# Patient Record
Sex: FEMALE | Race: WHITE | NOT HISPANIC OR LATINO | Employment: OTHER | URBAN - METROPOLITAN AREA
[De-identification: names, ages, dates, MRNs, and addresses within clinical notes are randomized per-mention and may not be internally consistent; named-entity substitution may affect disease eponyms.]

---

## 2017-01-03 ENCOUNTER — PATIENT MESSAGE (OUTPATIENT)
Dept: RHEUMATOLOGY | Facility: CLINIC | Age: 72
End: 2017-01-03

## 2017-02-03 ENCOUNTER — PATIENT MESSAGE (OUTPATIENT)
Dept: RHEUMATOLOGY | Facility: CLINIC | Age: 72
End: 2017-02-03

## 2017-04-26 ENCOUNTER — PATIENT MESSAGE (OUTPATIENT)
Dept: INTERNAL MEDICINE | Facility: CLINIC | Age: 72
End: 2017-04-26

## 2017-04-26 ENCOUNTER — PATIENT MESSAGE (OUTPATIENT)
Dept: OPTOMETRY | Facility: CLINIC | Age: 72
End: 2017-04-26

## 2017-04-26 NOTE — TELEPHONE ENCOUNTER
Spoke with pt and scheduled her annual appt on July7th @1:00. Pt would like to have tests done. Please see msg

## 2017-05-09 ENCOUNTER — PATIENT MESSAGE (OUTPATIENT)
Dept: INTERNAL MEDICINE | Facility: CLINIC | Age: 72
End: 2017-05-09

## 2017-05-09 DIAGNOSIS — Z83.49 FAMILY HISTORY OF FABRY DISEASE: Primary | ICD-10-CM

## 2017-05-12 NOTE — TELEPHONE ENCOUNTER
Referral placed to Genetics clinic, please contact patient to help schedule. Message also sent to patient

## 2017-05-16 ENCOUNTER — OFFICE VISIT (OUTPATIENT)
Dept: OPTOMETRY | Facility: CLINIC | Age: 72
End: 2017-05-16
Payer: MEDICARE

## 2017-05-16 DIAGNOSIS — Z79.899 HIGH RISK MEDICATION USE: ICD-10-CM

## 2017-05-16 DIAGNOSIS — M35.03 SJOGREN'S SYNDROME WITH MYOPATHY: ICD-10-CM

## 2017-05-16 DIAGNOSIS — H25.813 COMBINED FORMS OF AGE-RELATED CATARACT OF BOTH EYES: ICD-10-CM

## 2017-05-16 DIAGNOSIS — I70.0 AORTIC ATHEROSCLEROSIS: ICD-10-CM

## 2017-05-16 DIAGNOSIS — M05.79 RHEUMATOID ARTHRITIS INVOLVING MULTIPLE SITES WITH POSITIVE RHEUMATOID FACTOR: ICD-10-CM

## 2017-05-16 DIAGNOSIS — H35.372 EPIRETINAL MEMBRANE, LEFT EYE: Primary | ICD-10-CM

## 2017-05-16 PROCEDURE — 99999 PR PBB SHADOW E&M-EST. PATIENT-LVL II: CPT | Mod: PBBFAC,,, | Performed by: OPTOMETRIST

## 2017-05-16 PROCEDURE — 92015 DETERMINE REFRACTIVE STATE: CPT | Mod: S$GLB,,, | Performed by: OPTOMETRIST

## 2017-05-16 PROCEDURE — 92014 COMPRE OPH EXAM EST PT 1/>: CPT | Mod: S$GLB,,, | Performed by: OPTOMETRIST

## 2017-05-16 NOTE — PROGRESS NOTES
FRANK Martínez is a/an 72 y.o. Female who returns for continued eye care  She reports that her vision is ok with her current glasses. But sometimes   she noticed trouble with her distance vision even with glasses and near   vision problems sometimes. She uses Systane eye drops if she needs it. She   reports episodes of blurry vision in neither one or the other eye but just   on rare occasions ( 1x a week) but than it lasts about 20 min. Uses    Plaquenil since years and was diagnosed with intermitted exotropia before.    (+)blurred vision  (--)Headaches  +diplopia vertical most of the time  (--)flashes  (--)floaters   (--)pain  (--)Itching  (--)tearing  (--)burning  (+)Dryness rarely  (+) OTC Drops  (--)Photophobia       Last edited by Owen Tripp on 5/16/2017  2:58 PM.     ROS     Positive for: Gastrointestinal (benign liver cysts), Musculoskeletal   (sjrogen's; rheumatoid arthritis), Eyes (epiretinal membrane), Heme/Lymph   (reynaud'sds., osteopenia)    Negative for: Constitutional, Neurological, Skin, Genitourinary, HENT,   Endocrine, Cardiovascular, Respiratory, Psychiatric, Allergic/Imm    Last edited by Ila Gross, OD on 5/16/2017  3:55 PM. (History)        Assessment /Plan     For exam results, see Encounter Report.    1. Epiretinal membrane, left eye with h/o pseudohole --> stable  - no treatment needed at this time; Monitor annually    2. Plaquenil use for use Rheumatoid arthritis involving multiple sites with positive rheumatoid factor  - No maculopathy  - Monitor in 1 year with HVF 10-2 and OCT of macula    3. Sjogren's syndrome with myopathy   - No significant ocular symptoms  - Continue use of artificial tears as needed    4. Combined forms of age-related cataract of both eyes --> not subjectively visually significant  - no treatment needed at this time; Monitor annually    5. Aortic atherosclerosis  - no ocular associations (no [place] plaques or significant AV  changes)  - No ocular  treatment needed; monitor annually    6. Intermittent exotropia --> stable  - Prism in spec rx    7. Refractive error with Presbyopia  - Spec Rx per final Rx below  Glasses Prescription (5/16/2017)        Sphere Cylinder Axis Add Horz Prism Vert Prism    Right +0.50 +0.50 140 +2.50 0.5 in 0.5 up    Left +1.00 +0.75 115 +2.50 0.5 in 0.5 down    Type:  PAL    Expiration Date:  5/17/2018           Patient education; RTC in 1 year for Plaquenil check - HVF10-2, DFE ,OCT of macula and refraction at [facility]

## 2017-06-13 ENCOUNTER — OFFICE VISIT (OUTPATIENT)
Dept: OPHTHALMOLOGY | Facility: CLINIC | Age: 72
End: 2017-06-13
Payer: MEDICARE

## 2017-06-13 DIAGNOSIS — M06.9 RHEUMATOID ARTHRITIS INVOLVING MULTIPLE SITES, UNSPECIFIED RHEUMATOID FACTOR PRESENCE: ICD-10-CM

## 2017-06-13 DIAGNOSIS — H35.372 EPIRETINAL MEMBRANE, LEFT EYE: Primary | ICD-10-CM

## 2017-06-13 DIAGNOSIS — Z79.899 HIGH RISK MEDICATION USE: ICD-10-CM

## 2017-06-13 DIAGNOSIS — H25.813 COMBINED FORMS OF AGE-RELATED CATARACT OF BOTH EYES: ICD-10-CM

## 2017-06-13 PROCEDURE — 92134 CPTRZ OPH DX IMG PST SGM RTA: CPT | Mod: S$GLB,,, | Performed by: OPHTHALMOLOGY

## 2017-06-13 PROCEDURE — 92226 PR SPECIAL EYE EXAM, SUBSEQUENT: CPT | Mod: LT,S$GLB,, | Performed by: OPHTHALMOLOGY

## 2017-06-13 PROCEDURE — 99999 PR PBB SHADOW E&M-EST. PATIENT-LVL III: CPT | Mod: PBBFAC,,, | Performed by: OPHTHALMOLOGY

## 2017-06-13 PROCEDURE — 92014 COMPRE OPH EXAM EST PT 1/>: CPT | Mod: S$GLB,,, | Performed by: OPHTHALMOLOGY

## 2017-06-13 PROCEDURE — 99499 UNLISTED E&M SERVICE: CPT | Mod: S$GLB,,, | Performed by: OPHTHALMOLOGY

## 2017-06-13 NOTE — PROGRESS NOTES
OCT - ERM OS - minimal foveal traction; unchanged from previous exam    A/P    1.  Epiretinal membrane, left eye - not related to plaquenil         2.  Nuclear sclerosis - not visually significant        3.  Sicca syndrome without dry eye symptoms         4.  High-risk medication - Plaquenil use for Sjrogen's syndrome since 2014. - check OCT        1.  Yr. OCT

## 2017-06-21 ENCOUNTER — OFFICE VISIT (OUTPATIENT)
Dept: RHEUMATOLOGY | Facility: CLINIC | Age: 72
End: 2017-06-21
Payer: MEDICARE

## 2017-06-21 VITALS
BODY MASS INDEX: 29.18 KG/M2 | HEART RATE: 67 BPM | SYSTOLIC BLOOD PRESSURE: 157 MMHG | DIASTOLIC BLOOD PRESSURE: 78 MMHG | WEIGHT: 175.13 LBS | HEIGHT: 65 IN

## 2017-06-21 DIAGNOSIS — M15.9 PRIMARY OSTEOARTHRITIS INVOLVING MULTIPLE JOINTS: ICD-10-CM

## 2017-06-21 DIAGNOSIS — M35.00 SJOGREN'S SYNDROME, WITH UNSPECIFIED ORGAN INVOLVEMENT: Primary | ICD-10-CM

## 2017-06-21 PROCEDURE — 99213 OFFICE O/P EST LOW 20 MIN: CPT | Mod: S$GLB,,, | Performed by: INTERNAL MEDICINE

## 2017-06-21 PROCEDURE — 99499 UNLISTED E&M SERVICE: CPT | Mod: S$GLB,,, | Performed by: INTERNAL MEDICINE

## 2017-06-21 PROCEDURE — 1126F AMNT PAIN NOTED NONE PRSNT: CPT | Mod: S$GLB,,, | Performed by: INTERNAL MEDICINE

## 2017-06-21 PROCEDURE — 99999 PR PBB SHADOW E&M-EST. PATIENT-LVL III: CPT | Mod: PBBFAC,,, | Performed by: INTERNAL MEDICINE

## 2017-06-21 PROCEDURE — 1159F MED LIST DOCD IN RCRD: CPT | Mod: S$GLB,,, | Performed by: INTERNAL MEDICINE

## 2017-06-21 ASSESSMENT — ROUTINE ASSESSMENT OF PATIENT INDEX DATA (RAPID3)
PATIENT GLOBAL ASSESSMENT SCORE: 1.5
TOTAL RAPID3 SCORE: 1.83
PAIN SCORE: 3
MDHAQ FUNCTION SCORE: .3
FATIGUE SCORE: 1
PSYCHOLOGICAL DISTRESS SCORE: 0
AM STIFFNESS SCORE: 1, YES

## 2017-06-21 NOTE — PROGRESS NOTES
History of present illness: 72-year-old female I follow for primary Sjogren syndrome.  She has a history of inflammatory arthritis in the past but not recently.  She is on chronic Plaquenil therapy.  She has a history of Raynaud's phenomenon but this has not bothered her recently.  She remains on cevimeline twice daily.  She has had no recent eye dryness.  She is complaining of increased perspiration injury rolling.    I have also been following her for osteoarthritis.  She is taking diflunisal as needed.  It does help when she takes it.  Her worst areas are her left ankle and left knee.  She had had a previous episode of sciatica but this resolve.  She had a bad attack of knee pain but this also resolved.  She has had no joint swelling.    Physical examination:  Skin: No rashes  ENT: Decreased tears but adequate saliva  Musculoskeletal: She has decreased range of motion of the right shoulder with slight pain on range of motion.  She has no tenderness to palpation.  Left shoulder is unremarkable.  Elbows, wrists, small joints of the hands have no tenderness.  She has no synovitis.  Knees have good range of motion without pain on range of motion.  Ankles and feet are unremarkable.    Assessment:  1.  Primary Sjogren syndrome  2.  Osteoarthritis    Plans:  1. Taper cevimiline to see if she still needs it and whether this will help the perspiration injury rolling  2.  Continue Plaquenil as before  3.  Continue diflunisal as needed  4.  Return to see me in 12 months

## 2017-07-03 ENCOUNTER — TELEPHONE (OUTPATIENT)
Dept: INTERNAL MEDICINE | Facility: CLINIC | Age: 72
End: 2017-07-03

## 2017-07-03 ENCOUNTER — LAB VISIT (OUTPATIENT)
Dept: LAB | Facility: HOSPITAL | Age: 72
End: 2017-07-03
Attending: INTERNAL MEDICINE
Payer: MEDICARE

## 2017-07-03 DIAGNOSIS — M35.00 SJOGREN'S SYNDROME: ICD-10-CM

## 2017-07-03 DIAGNOSIS — M06.9 RHEUMATOID ARTHRITIS INVOLVING MULTIPLE SITES, UNSPECIFIED RHEUMATOID FACTOR PRESENCE: ICD-10-CM

## 2017-07-03 DIAGNOSIS — Z00.00 ANNUAL PHYSICAL EXAM: ICD-10-CM

## 2017-07-03 DIAGNOSIS — N20.0 NEPHROLITHIASIS: Primary | ICD-10-CM

## 2017-07-03 DIAGNOSIS — I70.0 AORTIC ATHEROSCLEROSIS: ICD-10-CM

## 2017-07-03 DIAGNOSIS — Z79.899 HIGH RISK MEDICATION USE: ICD-10-CM

## 2017-07-03 LAB
ALBUMIN SERPL BCP-MCNC: 3.6 G/DL
ALP SERPL-CCNC: 55 U/L
ALT SERPL W/O P-5'-P-CCNC: 14 U/L
ANION GAP SERPL CALC-SCNC: 10 MMOL/L
AST SERPL-CCNC: 22 U/L
BASOPHILS # BLD AUTO: 0.09 K/UL
BASOPHILS NFR BLD: 1.6 %
BILIRUB SERPL-MCNC: 0.8 MG/DL
BUN SERPL-MCNC: 21 MG/DL
CALCIUM SERPL-MCNC: 9.2 MG/DL
CHLORIDE SERPL-SCNC: 107 MMOL/L
CHOLEST/HDLC SERPL: 3.8 {RATIO}
CO2 SERPL-SCNC: 24 MMOL/L
CREAT SERPL-MCNC: 0.9 MG/DL
DIFFERENTIAL METHOD: NORMAL
EOSINOPHIL # BLD AUTO: 0.1 K/UL
EOSINOPHIL NFR BLD: 1.3 %
ERYTHROCYTE [DISTWIDTH] IN BLOOD BY AUTOMATED COUNT: 11.8 %
EST. GFR  (AFRICAN AMERICAN): >60 ML/MIN/1.73 M^2
EST. GFR  (NON AFRICAN AMERICAN): >60 ML/MIN/1.73 M^2
GLUCOSE SERPL-MCNC: 85 MG/DL
HCT VFR BLD AUTO: 40.7 %
HDL/CHOLESTEROL RATIO: 26.3 %
HDLC SERPL-MCNC: 167 MG/DL
HDLC SERPL-MCNC: 44 MG/DL
HGB BLD-MCNC: 13.5 G/DL
LDLC SERPL CALC-MCNC: 104.2 MG/DL
LYMPHOCYTES # BLD AUTO: 1.9 K/UL
LYMPHOCYTES NFR BLD: 34.6 %
MCH RBC QN AUTO: 30.8 PG
MCHC RBC AUTO-ENTMCNC: 33.2 %
MCV RBC AUTO: 93 FL
MONOCYTES # BLD AUTO: 0.5 K/UL
MONOCYTES NFR BLD: 8.8 %
NEUTROPHILS # BLD AUTO: 3 K/UL
NEUTROPHILS NFR BLD: 53.5 %
NONHDLC SERPL-MCNC: 123 MG/DL
PLATELET # BLD AUTO: 212 K/UL
PMV BLD AUTO: 11 FL
POTASSIUM SERPL-SCNC: 4.1 MMOL/L
PROT SERPL-MCNC: 7 G/DL
RBC # BLD AUTO: 4.39 M/UL
SODIUM SERPL-SCNC: 141 MMOL/L
TRIGL SERPL-MCNC: 94 MG/DL
TSH SERPL DL<=0.005 MIU/L-ACNC: 2.62 UIU/ML
WBC # BLD AUTO: 5.57 K/UL

## 2017-07-03 PROCEDURE — 36415 COLL VENOUS BLD VENIPUNCTURE: CPT

## 2017-07-03 PROCEDURE — 84443 ASSAY THYROID STIM HORMONE: CPT

## 2017-07-03 PROCEDURE — 80053 COMPREHEN METABOLIC PANEL: CPT

## 2017-07-03 PROCEDURE — 80061 LIPID PANEL: CPT

## 2017-07-03 PROCEDURE — 85025 COMPLETE CBC W/AUTO DIFF WBC: CPT

## 2017-07-03 NOTE — TELEPHONE ENCOUNTER
----- Message from Trinh Braden sent at 7/3/2017  8:53 AM CDT -----  Contact: Kylee/ lab/ [id number]   Kylee is calling to let the doctor know the pt collected a urine sample. She want to know if the pt collected the wrong sample. Please call and advise     Thank you

## 2017-07-03 NOTE — TELEPHONE ENCOUNTER
Spoke to lab patient has an order for a urine with stone analysis ordered by Dr. Garcia. Patient just brought a regular urine in. Is this correct or was she supposed to bring in a stone that she passed. Please advise.

## 2017-07-03 NOTE — TELEPHONE ENCOUNTER
Spoke to patient she states she hasn't had trouble in over a year with stones. Says she doesn't need to do any urine at this time.

## 2017-07-03 NOTE — TELEPHONE ENCOUNTER
Looks like she was to bring in stone sample  If  She passed one   She would have to bring in stone  I can re-enter order so available if she needs to bring in   Notify pt

## 2017-07-07 ENCOUNTER — OFFICE VISIT (OUTPATIENT)
Dept: INTERNAL MEDICINE | Facility: CLINIC | Age: 72
End: 2017-07-07
Payer: MEDICARE

## 2017-07-07 VITALS
SYSTOLIC BLOOD PRESSURE: 130 MMHG | TEMPERATURE: 98 F | BODY MASS INDEX: 29.31 KG/M2 | WEIGHT: 175.94 LBS | HEIGHT: 65 IN | OXYGEN SATURATION: 99 % | DIASTOLIC BLOOD PRESSURE: 85 MMHG | HEART RATE: 72 BPM

## 2017-07-07 DIAGNOSIS — Z00.00 ANNUAL PHYSICAL EXAM: Primary | ICD-10-CM

## 2017-07-07 DIAGNOSIS — I70.0 AORTIC ATHEROSCLEROSIS: ICD-10-CM

## 2017-07-07 DIAGNOSIS — Z78.0 ASYMPTOMATIC MENOPAUSAL STATE: ICD-10-CM

## 2017-07-07 DIAGNOSIS — M85.80 OSTEOPENIA DETERMINED BY X-RAY: ICD-10-CM

## 2017-07-07 DIAGNOSIS — Z12.31 ENCOUNTER FOR SCREENING MAMMOGRAM FOR BREAST CANCER: ICD-10-CM

## 2017-07-07 DIAGNOSIS — M06.9 RHEUMATOID ARTHRITIS INVOLVING MULTIPLE SITES, UNSPECIFIED RHEUMATOID FACTOR PRESENCE: ICD-10-CM

## 2017-07-07 DIAGNOSIS — M35.00 SJOGREN'S SYNDROME, WITH UNSPECIFIED ORGAN INVOLVEMENT: ICD-10-CM

## 2017-07-07 DIAGNOSIS — M15.9 PRIMARY OSTEOARTHRITIS INVOLVING MULTIPLE JOINTS: ICD-10-CM

## 2017-07-07 PROCEDURE — 99499 UNLISTED E&M SERVICE: CPT | Mod: S$GLB,,, | Performed by: INTERNAL MEDICINE

## 2017-07-07 PROCEDURE — 99999 PR PBB SHADOW E&M-EST. PATIENT-LVL IV: CPT | Mod: PBBFAC,,, | Performed by: INTERNAL MEDICINE

## 2017-07-07 PROCEDURE — 99397 PER PM REEVAL EST PAT 65+ YR: CPT | Mod: S$GLB,,, | Performed by: INTERNAL MEDICINE

## 2017-07-07 NOTE — PROGRESS NOTES
"Subjective:       Patient ID: Urmila DAN White is a 72 y.o. female.    Chief Complaint: Annual Exam    HPI   71 y/o woman with RA, Sjogren's, osteopenia, h/o nephrolithiasis here for annual exam. Labs done recently: CBC, CMP, lipids, TSH all normal.    No further flank pain or urinary problems. No hematuria.  Her  recently fell and broke his hip; her brother-in-law also recently  suddenly. With these life events she has been doing different activities with some increase in stress, and she has lost 10#.    Osteopenia - last DEXA 10/2015, prior was . Took hormone therapy for 1 year postmenopausal (around age 38), also took fosamax for some time. Currently taking calcium citrate and vitamin D   Has been on steroids in the past but only briefly and for respiratory issues / "colds", no long-term steroid use.    RA / Sjogrens - follows with Dr Figueroa, taking plaquenil and cevimeline. Now taking cevimeline once a day instead of BID. RA with mild joint pains that last a few days, but overall generally well-controlled.   Takes diflunisal as needed for arthritis flare.   Also follows with Dr Gonzalez yearly - last exam 17.     GERD - gets symptoms sometimes, mild heartburn, takes omeprazole about once a week.     Aortic atherosclerosis - Takes ASA 4 times/week. Not taking statin - does not want to add new medication at this time, especially with lipid panel overall good.    Diet - generally tries to follow healthy diet  Exercise - goes to [employer] gym for an hour 5 days/week, exercises in pool    Recently learned that cousin (on paternal side) diagnosed with Fabry disease - mutation at A257D. Referred to genetics, has appt in August for this.    FHx of breast cancer - grandmother. Strong family history of colon cancer (both parents), also aunt with stomach cancer, another aunt with thyroid cancer.  Brother and mother both with CAD, mother had CABG in her 50s.    Healthcare maintenance:  Gets colonoscopies " "regularly, did have 2 polyps previously that were nonmalignant, gets c-scope every 5 years.   Gets mammogram yearly, last 10/2016.   Follows with Dr Martínez for pap/Gyn, no h/o abnormal Pap in the past.    Review of Systems   Constitutional: Positive for activity change and unexpected weight change.   HENT: Negative for hearing loss, rhinorrhea and trouble swallowing.    Eyes: Positive for visual disturbance. Negative for discharge.   Respiratory: Negative for chest tightness and wheezing.    Cardiovascular: Negative for chest pain and palpitations.   Gastrointestinal: Negative for blood in stool, constipation, diarrhea and vomiting.   Endocrine: Negative for polydipsia and polyuria.   Genitourinary: Negative for difficulty urinating, dysuria, hematuria and menstrual problem.   Musculoskeletal: Positive for arthralgias. Negative for joint swelling and neck pain.   Neurological: Negative for weakness and headaches.   Psychiatric/Behavioral: Negative for confusion and dysphoric mood.         Past medical history, surgical history, and family medical history reviewed and updated as appropriate.    Medications and allergies reviewed.     Objective:          Vitals:    07/07/17 1309   BP: 130/85   BP Location: Right arm   Patient Position: Sitting   Pulse: 72   Temp: 98.3 °F (36.8 °C)   TempSrc: Oral   SpO2: 99%   Weight: 79.8 kg (175 lb 14.8 oz)   Height: 5' 5" (1.651 m)     Body mass index is 29.28 kg/m².  Physical Exam   Constitutional: She is oriented to person, place, and time. She appears well-developed and well-nourished.   HENT:   Head: Normocephalic and atraumatic.   Nose: Nose normal.   Mouth/Throat: Oropharynx is clear and moist.   Eyes: Conjunctivae and EOM are normal. Pupils are equal, round, and reactive to light. No scleral icterus.   Neck: Normal range of motion. Neck supple. No JVD present. No thyromegaly present.   Cardiovascular: Normal rate, regular rhythm, normal heart sounds and intact distal pulses.  "   No murmur heard.  Mild varicose / spider veins on legs, nontender   Pulmonary/Chest: Effort normal and breath sounds normal. No respiratory distress.   Abdominal: Soft. Bowel sounds are normal. She exhibits no distension. There is no tenderness.   Musculoskeletal: Normal range of motion. She exhibits no edema or tenderness.   Lymphadenopathy:     She has no cervical adenopathy.   Neurological: She is alert and oriented to person, place, and time. She has normal strength. No cranial nerve deficit or sensory deficit. Gait normal.   Skin: Skin is warm and dry.   Psychiatric: She has a normal mood and affect.   Vitals reviewed.      Lab Results   Component Value Date    WBC 5.57 07/03/2017    HGB 13.5 07/03/2017    HCT 40.7 07/03/2017     07/03/2017    CHOL 167 07/03/2017    TRIG 94 07/03/2017    HDL 44 07/03/2017    ALT 14 07/03/2017    AST 22 07/03/2017     07/03/2017    K 4.1 07/03/2017     07/03/2017    CREATININE 0.9 07/03/2017    BUN 21 07/03/2017    CO2 24 07/03/2017    TSH 2.619 07/03/2017       Assessment:       1. Annual physical exam    2. Encounter for screening mammogram for breast cancer    3. Osteopenia determined by x-ray    4. Asymptomatic menopausal state     5. Osteopenia, unspecified location    6. Rheumatoid arthritis involving multiple sites, unspecified rheumatoid factor presence    7. Primary osteoarthritis involving multiple joints    8. Sjogren's syndrome, with unspecified organ involvement    9. Aortic atherosclerosis        Plan:   Urmila was seen today for annual exam.    Diagnoses and all orders for this visit:    Annual physical exam - chronic health concerns and preventive health issues reviewed. Overall healthy and doing very well.   -     CBC auto differential; Future  -     Comprehensive metabolic panel; Future  -     Lipid panel; Future  -     TSH; Future    Encounter for screening mammogram for breast cancer  -     Mammo Digital Screening Bilat with CAD;  Future    Osteopenia determined by x-ray  -     DXA Bone Density Spine And Hip; Future    Asymptomatic menopausal state   -     DXA Bone Density Spine And Hip; Future    Rheumatoid arthritis involving multiple sites, unspecified rheumatoid factor presence - stable, doing well, follow with Dr Figueroa, continue current meds.  -     CBC auto differential; Future  -     Comprehensive metabolic panel; Future  -     Lipid panel; Future  -     TSH; Future    Primary osteoarthritis involving multiple joints - stable, doing well, follow with Dr Figueroa, continue current meds.    Sjogren's syndrome, with unspecified organ involvement - stable, doing well, follow with Dr Figueroa, continue current meds.  -     CBC auto differential; Future  -     Comprehensive metabolic panel; Future  -     Lipid panel; Future  -     TSH; Future    Aortic atherosclerosis - continue ASA; not starting statin now. Will follow lipids yearly. Continue healthy diet/exercise.  -     Lipid panel; Future    Immunizations up to date.  Mammogram and DEXA in October.   Fasting labs in 1 year before next annual exam.  Return in about 1 year (around 7/7/2018) for annual physical.    Jonathan Garcia MD  Internal Medicine  [facility]  7/7/2017

## 2017-08-07 ENCOUNTER — LAB VISIT (OUTPATIENT)
Dept: LAB | Facility: HOSPITAL | Age: 72
End: 2017-08-07
Attending: MEDICAL GENETICS
Payer: MEDICARE

## 2017-08-07 ENCOUNTER — OFFICE VISIT (OUTPATIENT)
Dept: GENETICS | Facility: CLINIC | Age: 72
End: 2017-08-07
Payer: MEDICARE

## 2017-08-07 VITALS — WEIGHT: 176.56 LBS | HEIGHT: 65 IN | BODY MASS INDEX: 29.42 KG/M2

## 2017-08-07 DIAGNOSIS — M06.9 RHEUMATOID ARTHRITIS INVOLVING MULTIPLE SITES, UNSPECIFIED RHEUMATOID FACTOR PRESENCE: ICD-10-CM

## 2017-08-07 DIAGNOSIS — Z83.49 FAMILY HISTORY OF FABRY DISEASE: ICD-10-CM

## 2017-08-07 DIAGNOSIS — M85.80 OSTEOPENIA, UNSPECIFIED LOCATION: ICD-10-CM

## 2017-08-07 DIAGNOSIS — M35.00 SJOGREN'S SYNDROME, WITH UNSPECIFIED ORGAN INVOLVEMENT: ICD-10-CM

## 2017-08-07 DIAGNOSIS — Z83.49 FAMILY HISTORY OF FABRY DISEASE: Primary | ICD-10-CM

## 2017-08-07 PROCEDURE — 1159F MED LIST DOCD IN RCRD: CPT | Mod: S$GLB,,, | Performed by: MEDICAL GENETICS

## 2017-08-07 PROCEDURE — 99205 OFFICE O/P NEW HI 60 MIN: CPT | Mod: S$GLB,,, | Performed by: MEDICAL GENETICS

## 2017-08-07 PROCEDURE — 36415 COLL VENOUS BLD VENIPUNCTURE: CPT | Mod: PO

## 2017-08-07 PROCEDURE — 1126F AMNT PAIN NOTED NONE PRSNT: CPT | Mod: S$GLB,,, | Performed by: MEDICAL GENETICS

## 2017-08-07 PROCEDURE — 99999 PR PBB SHADOW E&M-EST. PATIENT-LVL III: CPT | Mod: PBBFAC,,, | Performed by: MEDICAL GENETICS

## 2017-08-07 NOTE — LETTER
August 8, 2017      Joanthan Garcia MD  [address]  [facility] [address]           [facility]  [address]  [facility] [address]  Phone: [phone]          Patient: Urmila Martínez   MR Number: [id number]   YOB: 1945   Date of Visit: 8/7/2017       Dear Dr. Jonathan Garcia:    Thank you for referring Urmila Martínez to me for evaluation. Attached you will find relevant portions of my assessment and plan of care.    If you have questions, please do not hesitate to call me. I look forward to following Urmila Martínez along with you.    Sincerely,    Oziel Ahumada MD    Enclosure  CC:  No Recipients    If you would like to receive this communication electronically, please contact [email or web address] or [phone] to request more information on [employer] access.    For providers and/or their staff who would like to refer a patient to [facility], please contact us through our one-stop-shop provider referral line, [facility], at [phone].    If you feel you have received this communication in error or would no longer like to receive these types of communications, please e-mail [email or web address]

## 2017-08-08 PROBLEM — Z83.49 FAMILY HISTORY OF FABRY DISEASE: Status: ACTIVE | Noted: 2017-08-08

## 2017-08-08 NOTE — PROGRESS NOTES
Urmila Martínez  DOS: 17  : 49  MRN: [id number]    REASON FOR CONSULT: Our Medical Genetic Service was asked to evaluate this 72-year-old  female with a family history of Fabry disease (FD) for her personal and family member risks.    PRESENT ILLNESS: Mrs. Martínez has no symptoms related to Fabry other than joint pain which was diagnosed as Sjogrens disease. She denies acroparesthesia, chronic GI problems, heart or kidney disease. Her paternal 1st male cousin  from FD and had a GLA mutation found in  at [place]. Mrs. Martínez wants to know if she would be at risk, as well as her daughter and grandchildren.    PAST MEDICAL HISTORY:     MEDS:     ALLERGIES:     DEVELOPMENTAL HISTORY: Normal.     SOCIAL HISTORY: not a smoker or drinker.      FAMILY HISTORY: Mrs. Vila paternal first cousin Aneudy  at the age of 55 from renal disease due to FD (he had classic FD manifestations). He had a GLA gene mutation found in  p.A257D and I do have the report. His enzyme level was not done. His mother (the patients paternal aunt) was never tested since she didnt have FD symptoms and she  at 80. Aneudys 2 sisters (including a twin) were tested for the mutation and did not have it. The patient is unaware of FD symptoms in her PGM and father. Her father  at 84 from colon and lung cancer and was not reported to have had any FD. The patients brother  in MVA. The patients duaghter is 48 and grandchildren in their teens and 20s and none of them have FD symptoms, just like the patient.    PHYSICAL EXAMINATION (limited): Wt: 80.1 kg, Ht 164.7 cm. Shes normocephalic with no dysmorphic features. She has normal language, alertness and cognition.     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 This defect prevents glycolipids globotriaosylceramide (GB3 or GL3) from being broken down and leads to progressive accumulation of  this substrate. Mutations in the GLA gene cause FD. Carrier females may be unaffected or may have disease presentation as severe as a male. Age of onset in males is usually between ages 6-8.  Symptoms can include: proteinuria, progressive renal disease, acroparesthesias, hypohidrosis, corneal dystrophy/opacity, angiokeratomas, obstructive pulmonary disease, gastrointestinal Issues, hearing loss, tinnitus, and neurological problems like strokes. Cardiac manifestations can include cardiomyopathy, left ventricular hypertrophy, cardiac arrhythmias and cardiac valve insufficiency. Individuals with FD report lower quality of life and psychiatric problems like panic attacks and depression are well documented.     I did discuss with the patient that even though she doesnt have FD symptoms, she can still be a carrier and can develop the disease later in life. If shes a carrier, shed have a 50% chance of passing it on to her duaghter who would in turn have a 50% chance of passing it on to her kids. The best way to know if shes a carrier is to test her GLA gene for her paternal 1st cousins mutation. She agreed and Yoselin collected her blood today.    RECOMMENDATIONS:  1. Testing the patient for the familial GLA mutation (p.A257D)  2. If positive, discuss management and test the family members.    REFERENCES:  1. Rajni Ellington. Fabry Disease. 2002 Aug 5 [Updated 2013 Oct 17]. In: Billie RA, Jesús MP, Mar ZAYAS, et al., editors. GeneRGetourguide® [Internet]. [place] ([place]): [facility]; 7451-7574. Available from: http://www.ncbi.nlm.nih.gov/books/CTD5754/  2. TORI Tuttle., Gabriel RMemo NIELSEN., JET Hill., AJ Hannah., Leslie RMemo ROMO., DEMETRIUS Becker., ... & REANNA Delarosa (2013). Fabry disease practice guidelines: recommendations of the National Society of Genetic Counselors. J Lena  22(5), 555-564.  3. Fabry Registry Guidelines  http://www.fabrydisease.org/images/ReferencePDFs/fabry-registry-schedule-of-assessments.pdf     Time spent: 60 minutes, more than 50% was spent in counseling. The note is in epic.     Oziel Ahumada M.D.                                                      Section Head - Medical Genetics                                                  [facility]

## 2017-08-10 ENCOUNTER — PATIENT MESSAGE (OUTPATIENT)
Dept: GENETICS | Facility: CLINIC | Age: 72
End: 2017-08-10

## 2017-08-23 ENCOUNTER — PATIENT MESSAGE (OUTPATIENT)
Dept: GENETICS | Facility: CLINIC | Age: 72
End: 2017-08-23

## 2017-08-24 LAB
MISCELLANEOUS TEST NAME: NORMAL
REFERENCE LAB: NORMAL
SPECIMEN TYPE: NORMAL
TEST RESULT: NORMAL

## 2017-10-23 ENCOUNTER — PATIENT MESSAGE (OUTPATIENT)
Dept: RHEUMATOLOGY | Facility: CLINIC | Age: 72
End: 2017-10-23

## 2017-10-23 RX ORDER — HYDROXYCHLOROQUINE SULFATE 200 MG/1
400 TABLET, FILM COATED ORAL DAILY
Qty: 180 TABLET | Refills: 3 | Status: SHIPPED | OUTPATIENT
Start: 2017-10-23 | End: 2018-12-10 | Stop reason: SDUPTHER

## 2017-10-23 RX ORDER — CEVIMELINE HYDROCHLORIDE 30 MG/1
30 CAPSULE ORAL 2 TIMES DAILY
Qty: 180 CAPSULE | Refills: 3 | Status: SHIPPED | OUTPATIENT
Start: 2017-10-23 | End: 2018-09-26 | Stop reason: SDUPTHER

## 2017-10-23 RX ORDER — HYDROXYCHLOROQUINE SULFATE 200 MG/1
TABLET, FILM COATED ORAL
Qty: 180 TABLET | Refills: 3 | Status: SHIPPED | OUTPATIENT
Start: 2017-10-23 | End: 2017-10-23 | Stop reason: SDUPTHER

## 2017-10-24 RX ORDER — DIFLUNISAL 500 MG/1
500 TABLET, FILM COATED ORAL 2 TIMES DAILY PRN
Qty: 180 TABLET | Refills: 1 | Status: SHIPPED | OUTPATIENT
Start: 2017-10-24 | End: 2018-01-22

## 2017-10-30 ENCOUNTER — HOSPITAL ENCOUNTER (OUTPATIENT)
Dept: RADIOLOGY | Facility: CLINIC | Age: 72
Discharge: HOME | End: 2017-10-30
Attending: INTERNAL MEDICINE
Payer: MEDICARE

## 2017-10-30 ENCOUNTER — HOSPITAL ENCOUNTER (OUTPATIENT)
Dept: RADIOLOGY | Facility: HOSPITAL | Age: 72
Discharge: HOME | End: 2017-10-30
Attending: INTERNAL MEDICINE
Payer: MEDICARE

## 2017-10-30 VITALS — BODY MASS INDEX: 30.22 KG/M2 | HEIGHT: 64 IN | WEIGHT: 177 LBS

## 2017-10-30 DIAGNOSIS — M85.80 OSTEOPENIA DETERMINED BY X-RAY: ICD-10-CM

## 2017-10-30 DIAGNOSIS — Z78.0 ASYMPTOMATIC MENOPAUSAL STATE: ICD-10-CM

## 2017-10-30 DIAGNOSIS — Z12.31 ENCOUNTER FOR SCREENING MAMMOGRAM FOR BREAST CANCER: ICD-10-CM

## 2017-10-30 PROCEDURE — 77067 SCR MAMMO BI INCL CAD: CPT | Mod: 26,,, | Performed by: RADIOLOGY

## 2017-10-30 PROCEDURE — 77063 BREAST TOMOSYNTHESIS BI: CPT | Mod: 26,,, | Performed by: RADIOLOGY

## 2017-10-30 PROCEDURE — 77067 SCR MAMMO BI INCL CAD: CPT | Mod: TC

## 2017-10-30 PROCEDURE — 77080 DXA BONE DENSITY AXIAL: CPT | Mod: 26,,, | Performed by: INTERNAL MEDICINE

## 2017-10-30 PROCEDURE — 77080 DXA BONE DENSITY AXIAL: CPT | Mod: TC

## 2017-11-13 NOTE — PROGRESS NOTES
Bone density scan shows osteopenia (borderline osteoporosis). Calculations show high fracture risk and do support treating with medication for her osteoporosis. She does not need to start this right away, but I would like her to come in for focused visit to review the results and discuss treatment options. Please contact her to review results, schedule follow up.  Jonathan Garcia MD

## 2017-12-01 ENCOUNTER — OFFICE VISIT (OUTPATIENT)
Dept: INTERNAL MEDICINE | Facility: CLINIC | Age: 72
End: 2017-12-01
Payer: MEDICARE

## 2017-12-01 VITALS
DIASTOLIC BLOOD PRESSURE: 80 MMHG | TEMPERATURE: 98 F | HEIGHT: 64 IN | SYSTOLIC BLOOD PRESSURE: 125 MMHG | HEART RATE: 74 BPM | WEIGHT: 181.44 LBS | BODY MASS INDEX: 30.98 KG/M2 | OXYGEN SATURATION: 99 %

## 2017-12-01 DIAGNOSIS — Z91.89 AT HIGH RISK FOR OSTEOPOROSIS: ICD-10-CM

## 2017-12-01 DIAGNOSIS — M85.80 OSTEOPENIA DETERMINED BY X-RAY: Primary | ICD-10-CM

## 2017-12-01 DIAGNOSIS — M15.9 PRIMARY OSTEOARTHRITIS INVOLVING MULTIPLE JOINTS: ICD-10-CM

## 2017-12-01 PROCEDURE — 99999 PR PBB SHADOW E&M-EST. PATIENT-LVL III: CPT | Mod: PBBFAC,,, | Performed by: INTERNAL MEDICINE

## 2017-12-01 PROCEDURE — 99214 OFFICE O/P EST MOD 30 MIN: CPT | Mod: S$GLB,,, | Performed by: INTERNAL MEDICINE

## 2017-12-01 PROCEDURE — 99499 UNLISTED E&M SERVICE: CPT | Mod: S$GLB,,, | Performed by: INTERNAL MEDICINE

## 2017-12-01 RX ORDER — ALENDRONATE SODIUM 70 MG/1
70 TABLET ORAL
Qty: 4 TABLET | Refills: 11 | Status: SHIPPED | OUTPATIENT
Start: 2017-12-01 | End: 2018-11-19 | Stop reason: SDUPTHER

## 2017-12-01 NOTE — PROGRESS NOTES
"Subjective:       Patient ID: Urmila DAN White is a 72 y.o. female.    Chief Complaint: Osteoporosis    HPI  73 y/o woman with RA, Sjogren's, osteopenia, h/o nephrolithiasis here for follow up on DEXA results / osteopenia.    Osteopenia - Previous DEXA 10/2015, 2011. Took hormone therapy for 1 year postmenopausal (early menopause around age 38), stopped due to concerns about risks. Also took fosamax for about 2 years after 2011. Currently taking calcium citrate and vitamin D   Has been on steroids in the past but only briefly and for respiratory issues / "colds", no long-term steroid use.  DEXA done 10/30/17: "Osteopenia of lumbar spine, total hip, and femoral neck. There has been no significant change from the previous study.  FRAX calculation indicates high fracture risk and does support treatment for osteoporosis."    Sjogrens, h/o inflammatory arthritis, OA - follows with Dr Figueroa, taking plaquenil and cevimeline.  Having pain at medial L knee for about 3 months, gradually getting worse, now present every day. Worse with going from sitting to standing, or with leaning / putting weight on leg with , also lateral R knee - chronic, flares with weather change.    Support / caregiver for her , who is continuing to have hip pain / difficulty walking.    Review of Systems   Constitutional: Negative for activity change and unexpected weight change.   HENT: Negative for hearing loss, rhinorrhea and trouble swallowing.    Eyes: Negative for discharge and visual disturbance.   Respiratory: Negative for chest tightness and wheezing.    Cardiovascular: Negative for chest pain and palpitations.   Gastrointestinal: Negative for blood in stool, constipation, diarrhea and vomiting.   Endocrine: Negative for polydipsia and polyuria.   Genitourinary: Negative for difficulty urinating, dysuria, hematuria and menstrual problem.   Musculoskeletal: Negative for arthralgias, joint swelling and neck pain.   Neurological: Negative " "for weakness and headaches.   Psychiatric/Behavioral: Negative for confusion and dysphoric mood.         Past medical history, surgical history, and family medical history reviewed and updated as appropriate.    Medications and allergies reviewed.     Objective:          Vitals:    12/01/17 1401   BP: 125/80   BP Location: Right arm   Patient Position: Sitting   Pulse: 74   Temp: 98 °F (36.7 °C)   TempSrc: Oral   SpO2: 99%   Weight: 82.3 kg (181 lb 7 oz)   Height: 5' 4" (1.626 m)     Body mass index is 31.14 kg/m².  Physical Exam   Constitutional: She is oriented to person, place, and time. She appears well-developed and well-nourished.   HENT:   Head: Normocephalic and atraumatic.   Mouth/Throat: Oropharynx is clear and moist.   Eyes: Conjunctivae and EOM are normal. Pupils are equal, round, and reactive to light. No scleral icterus.   Neck: Neck supple.   Cardiovascular: Normal rate, regular rhythm and normal heart sounds.    No murmur heard.  Mild varicose / spider veins on legs, nontender   Pulmonary/Chest: Effort normal and breath sounds normal. No respiratory distress.   Abdominal: Soft. She exhibits no distension. There is no tenderness.   Musculoskeletal: Normal range of motion. She exhibits tenderness (L knee with area of tenderness anterior/medial joint line; slight effusion bilaterally). She exhibits no edema.   Neurological: She is alert and oriented to person, place, and time. She has normal strength. No cranial nerve deficit. Gait normal.   Skin: Skin is warm and dry.   Psychiatric: She has a normal mood and affect.   Vitals reviewed.      Lab Results   Component Value Date    WBC 5.57 07/03/2017    HGB 13.5 07/03/2017    HCT 40.7 07/03/2017     07/03/2017    CHOL 167 07/03/2017    TRIG 94 07/03/2017    HDL 44 07/03/2017    ALT 14 07/03/2017    AST 22 07/03/2017     07/03/2017    K 4.1 07/03/2017     07/03/2017    CREATININE 0.9 07/03/2017    BUN 21 07/03/2017    CO2 24 07/03/2017    " TSH 2.619 07/03/2017       Assessment:       1. Osteopenia determined by x-ray    2. At high risk for osteoporosis    3. Primary osteoarthritis involving multiple joints        Plan:   Urmila was seen today for osteoporosis.    Diagnoses and all orders for this visit:    Osteopenia determined by x-ray  -     alendronate (FOSAMAX) 70 MG tablet; Take 1 tablet (70 mg total) by mouth every 7 days.  At high risk for osteoporosis  -     alendronate (FOSAMAX) 70 MG tablet; Take 1 tablet (70 mg total) by mouth every 7 days.  Discussed options for treatment; she tolerated fosamax well previously, though may have had some GERD at the time. Will try this again; counseled re: taking this with full glass of water once a week. Counseled re: common / serious side effects.   Plan for repeat DEXA in 2 years.  Reviewed previous DEXA reports with patient.    Primary osteoarthritis involving multiple joints - recommended check in with Dr Figueroa about her knee pain; may benefit from steroid joint injection given worsening pain over past few months    Health maintenance reviewed with patient. Up to date.  Labs to be done prior to next visit  Return in about 7 months (around 7/1/2018) for annual physical.    Jonathan Garcia MD  Internal Medicine  [facility]  12/1/2017

## 2018-01-12 ENCOUNTER — PES CALL (OUTPATIENT)
Dept: ADMINISTRATIVE | Facility: CLINIC | Age: 73
End: 2018-01-12

## 2018-01-31 ENCOUNTER — HOSPITAL ENCOUNTER (OUTPATIENT)
Dept: RADIOLOGY | Facility: HOSPITAL | Age: 73
Discharge: HOME | End: 2018-01-31
Attending: PHYSICIAN ASSISTANT
Payer: MEDICARE

## 2018-01-31 DIAGNOSIS — M25.561 ACUTE PAIN OF BOTH KNEES: ICD-10-CM

## 2018-01-31 DIAGNOSIS — M25.562 ACUTE PAIN OF BOTH KNEES: ICD-10-CM

## 2018-01-31 DIAGNOSIS — M25.572 ACUTE LEFT ANKLE PAIN: ICD-10-CM

## 2018-01-31 PROCEDURE — 73610 X-RAY EXAM OF ANKLE: CPT | Mod: 26,LT,, | Performed by: RADIOLOGY

## 2018-01-31 PROCEDURE — 73610 X-RAY EXAM OF ANKLE: CPT | Mod: TC,LT

## 2018-01-31 PROCEDURE — 73562 X-RAY EXAM OF KNEE 3: CPT | Mod: 26,50,, | Performed by: RADIOLOGY

## 2018-01-31 PROCEDURE — 73562 X-RAY EXAM OF KNEE 3: CPT | Mod: TC,50

## 2018-04-18 ENCOUNTER — TELEPHONE (OUTPATIENT)
Dept: INTERNAL MEDICINE | Facility: CLINIC | Age: 73
End: 2018-04-18

## 2018-04-18 NOTE — TELEPHONE ENCOUNTER
----- Message from Khadra Cervantes sent at 4/18/2018  9:04 AM CDT -----  Contact: self/[phone]  Doctor appointment have been scheduled.    Date of doctor appointment:  07/13/18  Physical or EP:  Physical  Date of lab appointment:    Comments: Patient would like to know if Dr Garcia want test before she is seen, also mammogram, Bone Density. Thank you

## 2018-04-19 NOTE — TELEPHONE ENCOUNTER
Repeat bone density test not needed til 10/2019 at earliest.  Patient had mammogram done 10/2017, does not need mammogram repeated until 10/2018.

## 2018-06-21 ENCOUNTER — OFFICE VISIT (OUTPATIENT)
Dept: RHEUMATOLOGY | Facility: CLINIC | Age: 73
End: 2018-06-21
Payer: MEDICARE

## 2018-06-21 VITALS
BODY MASS INDEX: 30.22 KG/M2 | HEART RATE: 70 BPM | WEIGHT: 177 LBS | HEIGHT: 64 IN | DIASTOLIC BLOOD PRESSURE: 84 MMHG | SYSTOLIC BLOOD PRESSURE: 152 MMHG

## 2018-06-21 DIAGNOSIS — M15.9 PRIMARY OSTEOARTHRITIS INVOLVING MULTIPLE JOINTS: ICD-10-CM

## 2018-06-21 DIAGNOSIS — M35.01 SJOGREN'S SYNDROME WITH KERATOCONJUNCTIVITIS SICCA: Primary | ICD-10-CM

## 2018-06-21 PROCEDURE — 3079F DIAST BP 80-89 MM HG: CPT | Mod: CPTII,S$GLB,, | Performed by: INTERNAL MEDICINE

## 2018-06-21 PROCEDURE — 99213 OFFICE O/P EST LOW 20 MIN: CPT | Mod: S$GLB,,, | Performed by: INTERNAL MEDICINE

## 2018-06-21 PROCEDURE — 99999 PR PBB SHADOW E&M-EST. PATIENT-LVL III: CPT | Mod: PBBFAC,,, | Performed by: INTERNAL MEDICINE

## 2018-06-21 PROCEDURE — 3077F SYST BP >= 140 MM HG: CPT | Mod: CPTII,S$GLB,, | Performed by: INTERNAL MEDICINE

## 2018-06-21 PROCEDURE — 99499 UNLISTED E&M SERVICE: CPT | Mod: S$PBB,,, | Performed by: INTERNAL MEDICINE

## 2018-06-21 RX ORDER — DIFLUNISAL 500 MG/1
TABLET, FILM COATED ORAL
COMMUNITY
End: 2018-12-13

## 2018-06-24 NOTE — PROGRESS NOTES
History of present illness:  73-year-old female with primary Sjogren syndrome.  She has been on chronic Plaquenil therapy.  She has a history of inflammatory arthritis and Raynaud's phenomena.  Her dryness has improved.  She is taking Evoxac once daily.  She has not required eyedrops recently.  Her last eye exam was 2 weeks ago.  She has had some jaw pain.  She has not seen her dentist recently.    She complains of some aching in her hands.  The pain is intermittent.  It is worse when she uses her hands.  She has had no swelling in the hands.  Ice has been helping.  She has no numbness in her hands.  She had 1 episode of neck pain she has occasional pain in the low back.  She also had an episode of left hip pain.  She continues to take Dolobid.  She takes it only as needed.  It helps when she takes it.    Physical examination:  ENT:  Adequate tears in saliva.  No conjunctivitis or oral ulcers.  No TMJ tenderness.  Musculoskeletal:  Cervical spine is unremarkable.  She has decreased range of motion of the right shoulder with some pain on range of motion.  She has no tender to palpation.  Left shoulder is unremarkable.  Elbows and wrists are unremarkable.  She has bony hypertrophy of the hands but no synovitis.  She has no tenderness to palpation.  She has a negative Tinel sign.  She has tenderness of the lower lumbar spine with some pain on range of motion.  Straight leg raising is negative.  Hips, knees, ankles, small joints of feet are unremarkable.    Assessment:  1.  Primary Sjogren syndrome  2.  Osteoarthritis    Plans:  1.  Continue medications as before  2.  She needs to see her dentist  3.  Return to see me in 12 months.

## 2018-07-13 ENCOUNTER — OFFICE VISIT (OUTPATIENT)
Dept: INTERNAL MEDICINE | Facility: CLINIC | Age: 73
End: 2018-07-13
Payer: MEDICARE

## 2018-07-13 VITALS
HEIGHT: 65 IN | BODY MASS INDEX: 30.49 KG/M2 | SYSTOLIC BLOOD PRESSURE: 109 MMHG | HEART RATE: 73 BPM | WEIGHT: 183 LBS | DIASTOLIC BLOOD PRESSURE: 80 MMHG | OXYGEN SATURATION: 96 %

## 2018-07-13 DIAGNOSIS — I70.0 AORTIC ATHEROSCLEROSIS: ICD-10-CM

## 2018-07-13 DIAGNOSIS — M15.9 PRIMARY OSTEOARTHRITIS INVOLVING MULTIPLE JOINTS: ICD-10-CM

## 2018-07-13 DIAGNOSIS — Z00.00 ANNUAL PHYSICAL EXAM: Primary | ICD-10-CM

## 2018-07-13 DIAGNOSIS — M35.01 SJOGREN'S SYNDROME WITH KERATOCONJUNCTIVITIS SICCA: ICD-10-CM

## 2018-07-13 DIAGNOSIS — Z80.0 FAMILY HISTORY OF COLON CANCER: ICD-10-CM

## 2018-07-13 DIAGNOSIS — Z12.31 ENCOUNTER FOR SCREENING MAMMOGRAM FOR BREAST CANCER: ICD-10-CM

## 2018-07-13 DIAGNOSIS — K21.9 GASTROESOPHAGEAL REFLUX DISEASE WITHOUT ESOPHAGITIS: ICD-10-CM

## 2018-07-13 DIAGNOSIS — M85.80 OSTEOPENIA DETERMINED BY X-RAY: ICD-10-CM

## 2018-07-13 PROCEDURE — 3074F SYST BP LT 130 MM HG: CPT | Mod: CPTII,S$GLB,, | Performed by: INTERNAL MEDICINE

## 2018-07-13 PROCEDURE — 99999 PR PBB SHADOW E&M-EST. PATIENT-LVL IV: CPT | Mod: PBBFAC,,, | Performed by: INTERNAL MEDICINE

## 2018-07-13 PROCEDURE — 3079F DIAST BP 80-89 MM HG: CPT | Mod: CPTII,S$GLB,, | Performed by: INTERNAL MEDICINE

## 2018-07-13 PROCEDURE — 99397 PER PM REEVAL EST PAT 65+ YR: CPT | Mod: S$GLB,,, | Performed by: INTERNAL MEDICINE

## 2018-07-13 PROCEDURE — 99499 UNLISTED E&M SERVICE: CPT | Mod: HCNC,S$GLB,, | Performed by: INTERNAL MEDICINE

## 2018-07-13 RX ORDER — ATORVASTATIN CALCIUM 20 MG/1
20 TABLET, FILM COATED ORAL DAILY
Qty: 90 TABLET | Refills: 3 | Status: SHIPPED | OUTPATIENT
Start: 2018-07-13 | End: 2018-09-26 | Stop reason: SDUPTHER

## 2018-07-13 RX ORDER — AMOXICILLIN 500 MG/1
CAPSULE ORAL
COMMUNITY
Start: 2018-06-29 | End: 2018-07-13 | Stop reason: ALTCHOICE

## 2018-07-13 NOTE — PROGRESS NOTES
"Subjective:       Patient ID: Urmila DAN White is a 73 y.o. female.    Chief Complaint: Annual Exam (yearly check up.)    HPI  72 y/o woman with Sjogren's, inflammatory arthritis, OA, osteopenia, h/o nephrolithiasis here for annual exam.  Hasn't yet had labs done.    Going for root canal later today, has recently been on amoxicillin for dental infection.  Her dental provider is aware of her alendronate use.    Osteopenia - Previous DEXA 10/2015, 2011. Took hormone therapy for 1 year postmenopausal (early menopause around age 38), stopped due to concerns about risks. Also took fosamax for about 2 years after 2011. Currently taking calcium citrate and vitamin D   Has been on steroids in the past but only briefly and for respiratory issues / "colds", no long-term steroid use.  DEXA 10/2017 showed osteopenia with no significant change but FRAX score supported treatment.   Restarted alendronate 12/2017. Planned for repeat DEXA at 2 years.    Sjogrens, h/o inflammatory arthritis, OA - follows with Dr Figueroa, taking plaquenil and cevimeline daily. Takes diflunisal PRN joint pain - takes this for 3 days if having flare, uses this about 4-5 times/year.  Has followed with Dr Gonzalez yearly for epiretinal membrane (not related to plaquenil)- last exam 6/13/17. Also follows with Dr Brock on [facility] for ophthalmology, seen within the past 2 weeks.     GERD - gets symptoms sometimes (epigastric burning), notes this has been worse since starting fosamax. Takes rolaids sometimes at night. Previously took omeprazole sometimes, but recently has stopped this entirely.  Reports having EGD in the past that was normal.    Aortic atherosclerosis - Takes ASA 4 times/week; too much bruising if taking daily. Not taking statin - have discussed in past and didn't want to add medication    Does have leg swelling sometimes if legs are dependent or if she's on her feet for long periods. Hasn't yet tried compression stockings.    Strong family " "history of colon cancer (both parents), also aunt with stomach cancer, another aunt with thyroid cancer. Grandmother with breast cancer.  Brother and mother both with CAD, mother had CABG in her 50s.  Saw [facility] 8/2017 for family history of Fabry disease (paternal cousin).    Mammogram done 10/2017.  Gets colonoscopies regularly, did have 2 polyps previously that were nonmalignant, gets c-scope every 5 years - last in 2015, due in 2020  Follows with Dr Martínez for pap/Gyn, no h/o abnormal Pap in the past, last visit 10/2016, due for follow up 11/2018.    Review of Systems   Constitutional: Negative for activity change and unexpected weight change.   HENT: Positive for dental problem. Negative for hearing loss, rhinorrhea and trouble swallowing.    Eyes: Negative for discharge and visual disturbance.   Respiratory: Negative for cough, chest tightness, shortness of breath and wheezing.    Cardiovascular: Positive for leg swelling (occasionally, mild). Negative for chest pain and palpitations.   Gastrointestinal: Negative for abdominal pain, blood in stool, constipation, diarrhea and vomiting.   Endocrine: Negative for polydipsia and polyuria.   Genitourinary: Negative for difficulty urinating, dysuria, hematuria and menstrual problem.   Musculoskeletal: Positive for arthralgias (chronic, intermittent). Negative for gait problem, joint swelling and neck pain.   Skin: Negative for rash.   Neurological: Negative for weakness and headaches.   Psychiatric/Behavioral: Negative for confusion and dysphoric mood. The patient is not nervous/anxious.          Past medical history, surgical history, and family medical history reviewed and updated as appropriate.    Medications and allergies reviewed.     Objective:          Vitals:    07/13/18 0847   BP: 109/80   BP Location: Right arm   Patient Position: Sitting   BP Method: Medium (Manual)   Pulse: 73   SpO2: 96%   Weight: 83 kg (183 lb)   Height: 5' 5" (1.651 m)     Body mass " index is 30.45 kg/m².  Physical Exam   Constitutional: She is oriented to person, place, and time. She appears well-developed and well-nourished.   HENT:   Head: Normocephalic and atraumatic.   Nose: Nose normal.   Mouth/Throat: Oropharynx is clear and moist.   Eyes: Conjunctivae and EOM are normal. Pupils are equal, round, and reactive to light. No scleral icterus.   Neck: Normal range of motion. Neck supple. No JVD present. No thyromegaly present.   Cardiovascular: Normal rate, regular rhythm, normal heart sounds and intact distal pulses.    No murmur heard.  Mild varicose / spider veins on legs, nontender   Pulmonary/Chest: Effort normal and breath sounds normal. No respiratory distress.   Abdominal: Soft. Bowel sounds are normal. She exhibits no distension. There is no tenderness.   Musculoskeletal: Normal range of motion. She exhibits no edema or tenderness.   Lymphadenopathy:     She has no cervical adenopathy.   Neurological: She is alert and oriented to person, place, and time. She has normal strength. No cranial nerve deficit or sensory deficit. Gait normal.   Skin: Skin is warm and dry. No rash noted.   Psychiatric: She has a normal mood and affect. Her behavior is normal.   Vitals reviewed.      Lab Results   Component Value Date    WBC 5.57 07/03/2017    HGB 13.5 07/03/2017    HCT 40.7 07/03/2017     07/03/2017    CHOL 167 07/03/2017    TRIG 94 07/03/2017    HDL 44 07/03/2017    ALT 14 07/03/2017    AST 22 07/03/2017     07/03/2017    K 4.1 07/03/2017     07/03/2017    CREATININE 0.9 07/03/2017    BUN 21 07/03/2017    CO2 24 07/03/2017    TSH 2.619 07/03/2017       Assessment:       1. Annual physical exam    2. Aortic atherosclerosis    3. Encounter for screening mammogram for breast cancer    4. Family history of colon cancer    5. Sjogren's syndrome with keratoconjunctivitis sicca    6. Osteopenia determined by x-ray    7. Primary osteoarthritis involving multiple joints    8.  Gastroesophageal reflux disease without esophagitis        Plan:   Urmila was seen today for annual exam.    Diagnoses and all orders for this visit:    Annual physical exam - Overall doing well. Reviewed chronic and preventive health concerns.  Reviewed past labs, imaging, and specialist notes with patient  Repeat labs next week  Mammogram and Gyn follow up to be done 11/2018  -     Mammo Digital Screening Bilat with CAD; Future    Aortic atherosclerosis  Comments:  reviewed ASCVD risk calculator with patient - 10yr risk 7.6%, but this doesn't factor in autoimmune disease or FHx. On discussion, she is willing to go ahead and start statin to help reduce ASCVD risk  Continue taking ASA  Orders:  -     atorvastatin (LIPITOR) 20 MG tablet; Take 1 tablet (20 mg total) by mouth once daily.    Encounter for screening mammogram for breast cancer  -     Mammo Digital Screening Bilat with CAD; Future    Family history of colon cancer  Comments:  both parents. up to date on colonoscopy, due for next in 2020    Sjogren's syndrome with keratoconjunctivitis sicca - continue to follow with rheumatology    Osteopenia determined by x-ray - continue alendronate as noted, repeat DEXA 12/2019    Primary osteoarthritis involving multiple joints - continue current medications    Gastroesophageal reflux disease without esophagitis  Comments:  mild, occasional symptoms managed with rolaids; can also take ranitidine PRN. now off omeprazole entirely.    Recommended follow up with ophthalmologist for her eye problems; records requested from Dr Brock    Recommended try compression socks / stocking to help with leg swelling; also elevated feet and legs whenever this is feasible    Health maintenance reviewed with patient.   Recommended check with pharmacy later this year about getting shingles vaccine series, once this is available    Follow-up in about 1 year (around 7/13/2019) for annual physical.    Jonathan Garcia MD  Internal  Medicine [facility]  7/13/2018

## 2018-07-13 NOTE — PATIENT INSTRUCTIONS
Schedule your mammogram for November 2018  Follow up with Dr Martínez for Gyn in November 2018    Check with pharmacy in ~December about the new shingles vaccine (Shingrix); should get this if there is a reliable enough supply for you to get both of the 2-dose series (second one 2-6 months after the first).    You can try getting compression socks / stockings to help with leg swelling.    We are starting a statin medication today to help reduce your risk of developing further atherosclerotic disease which could lead to a heart attack or stroke.      Atorvastatin tablets  What is this medicine?  ATORVASTATIN (a TORE va sta tin) is known as a HMG-CoA reductase inhibitor or 'statin'. It lowers the level of cholesterol and triglycerides in the blood. This drug may also reduce the risk of heart attack, stroke, or other health problems in patients with risk factors for heart disease. Diet and lifestyle changes are often used with this drug.  How should I use this medicine?  Take this medicine by mouth with a glass of water. Follow the directions on the prescription label. You can take this medicine with or without food. Take your doses at regular intervals. Do not take your medicine more often than directed.  Talk to your pediatrician regarding the use of this medicine in children. While this drug may be prescribed for children as young as 10 years old for selected conditions, precautions do apply.  What side effects may I notice from receiving this medicine?  Side effects that you should report to your doctor or health care professional as soon as possible:  · allergic reactions like skin rash, itching or hives, swelling of the face, lips, or tongue  · dark urine  · fever  · joint pain  · muscle cramps, pain  · redness, blistering, peeling or loosening of the skin, including inside the mouth  · trouble passing urine or change in the amount of urine  · unusually weak or tired  · yellowing of eyes or skin  Side effects that  usually do not require medical attention (report to your doctor or health care professional if they continue or are bothersome):  · constipation  · heartburn  · stomach gas, pain, upset  What may interact with this medicine?  Do not take this medicine with any of the following medications:  · red yeast rice  · telaprevir  · telithromycin  · voriconazole  This medicine may also interact with the following medications:  · alcohol  · antiviral medicines for HIV or AIDS  · boceprevir  · certain antibiotics like clarithromycin, erythromycin, troleandomycin  · certain medicines for cholesterol like fenofibrate or gemfibrozil  · cimetidine  · clarithromycin  · colchicine  · cyclosporine  · digoxin  · female hormones, like estrogens or progestins and birth control pills  · grapefruit juice  · medicines for fungal infections like fluconazole, itraconazole, ketoconazole  · niacin  · rifampin  · spironolactone  What if I miss a dose?  If you miss a dose, take it as soon as you can. If it is almost time for your next dose, take only that dose. Do not take double or extra doses.  Where should I keep my medicine?  Keep out of the reach of children.  Store at room temperature between 20 to 25 degrees C (68 to 77 degrees F). Throw away any unused medicine after the expiration date.  What should I tell my health care provider before I take this medicine?  They need to know if you have any of these conditions:  · frequently drink alcoholic beverages  · history of stroke, TIA  · kidney disease  · liver disease  · muscle aches or weakness  · other medical condition  · an unusual or allergic reaction to atorvastatin, other medicines, foods, dyes, or preservatives  · pregnant or trying to get pregnant  · breast-feeding  What should I watch for while using this medicine?  Visit your doctor or health care professional for regular check-ups. You may need regular tests to make sure your liver is working properly.  Tell your doctor or health  care professional right away if you get any unexplained muscle pain, tenderness, or weakness, especially if you also have a fever and tiredness. Your doctor or health care professional may tell you to stop taking this medicine if you develop muscle problems. If your muscle problems do not go away after stopping this medicine, contact your health care professional.  This drug is only part of a total heart-health program. Your doctor or a dietician can suggest a low-cholesterol and low-fat diet to help. Avoid alcohol and smoking, and keep a proper exercise schedule.  Do not use this drug if you are pregnant or breast-feeding. Serious side effects to an unborn child or to an infant are possible. Talk to your doctor or pharmacist for more information.  This medicine may affect blood sugar levels. If you have diabetes, check with your doctor or health care professional before you change your diet or the dose of your diabetic medicine.  If you are going to have surgery tell your health care professional that you are taking this drug.  NOTE:This sheet is a summary. It may not cover all possible information. If you have questions about this medicine, talk to your doctor, pharmacist, or health care provider. Copyright© 2017 [employer]

## 2018-07-17 ENCOUNTER — LAB VISIT (OUTPATIENT)
Dept: LAB | Facility: HOSPITAL | Age: 73
End: 2018-07-17
Attending: INTERNAL MEDICINE
Payer: MEDICARE

## 2018-07-17 DIAGNOSIS — M06.9 RHEUMATOID ARTHRITIS INVOLVING MULTIPLE SITES, UNSPECIFIED RHEUMATOID FACTOR PRESENCE: ICD-10-CM

## 2018-07-17 DIAGNOSIS — I70.0 AORTIC ATHEROSCLEROSIS: ICD-10-CM

## 2018-07-17 DIAGNOSIS — M35.00 SJOGREN'S SYNDROME, WITH UNSPECIFIED ORGAN INVOLVEMENT: ICD-10-CM

## 2018-07-17 DIAGNOSIS — Z00.00 ANNUAL PHYSICAL EXAM: ICD-10-CM

## 2018-07-17 LAB
ALBUMIN SERPL BCP-MCNC: 3.9 G/DL
ALP SERPL-CCNC: 46 U/L
ALT SERPL W/O P-5'-P-CCNC: 17 U/L
ANION GAP SERPL CALC-SCNC: 7 MMOL/L
AST SERPL-CCNC: 24 U/L
BASOPHILS # BLD AUTO: 0.11 K/UL
BASOPHILS NFR BLD: 1.9 %
BILIRUB SERPL-MCNC: 1 MG/DL
BUN SERPL-MCNC: 18 MG/DL
CALCIUM SERPL-MCNC: 9.6 MG/DL
CHLORIDE SERPL-SCNC: 109 MMOL/L
CHOLEST SERPL-MCNC: 160 MG/DL
CHOLEST/HDLC SERPL: 3.6 {RATIO}
CO2 SERPL-SCNC: 25 MMOL/L
CREAT SERPL-MCNC: 0.9 MG/DL
DIFFERENTIAL METHOD: ABNORMAL
EOSINOPHIL # BLD AUTO: 0.1 K/UL
EOSINOPHIL NFR BLD: 1 %
ERYTHROCYTE [DISTWIDTH] IN BLOOD BY AUTOMATED COUNT: 11.9 %
EST. GFR  (AFRICAN AMERICAN): >60 ML/MIN/1.73 M^2
EST. GFR  (NON AFRICAN AMERICAN): >60 ML/MIN/1.73 M^2
GLUCOSE SERPL-MCNC: 89 MG/DL
HCT VFR BLD AUTO: 39.8 %
HDLC SERPL-MCNC: 44 MG/DL
HDLC SERPL: 27.5 %
HGB BLD-MCNC: 13.6 G/DL
LDLC SERPL CALC-MCNC: 93.4 MG/DL
LYMPHOCYTES # BLD AUTO: 1.8 K/UL
LYMPHOCYTES NFR BLD: 30.8 %
MCH RBC QN AUTO: 31.1 PG
MCHC RBC AUTO-ENTMCNC: 34.2 G/DL
MCV RBC AUTO: 91 FL
MONOCYTES # BLD AUTO: 0.6 K/UL
MONOCYTES NFR BLD: 9.7 %
NEUTROPHILS # BLD AUTO: 3.2 K/UL
NEUTROPHILS NFR BLD: 56.4 %
NONHDLC SERPL-MCNC: 116 MG/DL
PLATELET # BLD AUTO: 231 K/UL
PMV BLD AUTO: 11.3 FL
POTASSIUM SERPL-SCNC: 4.6 MMOL/L
PROT SERPL-MCNC: 7.2 G/DL
RBC # BLD AUTO: 4.37 M/UL
SODIUM SERPL-SCNC: 141 MMOL/L
TRIGL SERPL-MCNC: 113 MG/DL
TSH SERPL DL<=0.005 MIU/L-ACNC: 2.71 UIU/ML
WBC # BLD AUTO: 5.75 K/UL

## 2018-07-17 PROCEDURE — 84443 ASSAY THYROID STIM HORMONE: CPT

## 2018-07-17 PROCEDURE — 85025 COMPLETE CBC W/AUTO DIFF WBC: CPT

## 2018-07-17 PROCEDURE — 80053 COMPREHEN METABOLIC PANEL: CPT

## 2018-07-17 PROCEDURE — 80061 LIPID PANEL: CPT

## 2018-07-17 PROCEDURE — 36415 COLL VENOUS BLD VENIPUNCTURE: CPT

## 2018-07-18 ENCOUNTER — PATIENT MESSAGE (OUTPATIENT)
Dept: INTERNAL MEDICINE | Facility: CLINIC | Age: 73
End: 2018-07-18

## 2018-07-31 ENCOUNTER — PATIENT MESSAGE (OUTPATIENT)
Dept: INTERNAL MEDICINE | Facility: CLINIC | Age: 73
End: 2018-07-31

## 2018-08-15 ENCOUNTER — NURSE TRIAGE (OUTPATIENT)
Dept: ADMINISTRATIVE | Facility: CLINIC | Age: 73
End: 2018-08-15
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
## 2018-08-16 ENCOUNTER — TELEPHONE (OUTPATIENT)
Dept: INTERNAL MEDICINE | Facility: CLINIC | Age: 73
End: 2018-08-16

## 2018-08-16 ENCOUNTER — OFFICE VISIT (OUTPATIENT)
Dept: INTERNAL MEDICINE | Facility: CLINIC | Age: 73
End: 2018-08-16
Payer: MEDICARE

## 2018-08-16 ENCOUNTER — HOSPITAL ENCOUNTER (OUTPATIENT)
Dept: RADIOLOGY | Facility: HOSPITAL | Age: 73
Discharge: HOME | End: 2018-08-16
Attending: INTERNAL MEDICINE
Payer: MEDICARE

## 2018-08-16 ENCOUNTER — PATIENT MESSAGE (OUTPATIENT)
Dept: RHEUMATOLOGY | Facility: CLINIC | Age: 73
End: 2018-08-16
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
## 2018-08-16 DIAGNOSIS — M79.641 PAIN OF RIGHT HAND: ICD-10-CM

## 2018-08-16 DIAGNOSIS — M79.641 PAIN OF RIGHT HAND: Primary | ICD-10-CM

## 2018-08-16 PROCEDURE — 73130 X-RAY EXAM OF HAND: CPT | Mod: TC,RT

## 2018-08-16 PROCEDURE — 99999 PR PBB SHADOW E&M-EST. PATIENT-LVL IV: CPT | Mod: PBBFAC,,, | Performed by: INTERNAL MEDICINE

## 2018-08-16 PROCEDURE — 99213 OFFICE O/P EST LOW 20 MIN: CPT | Mod: S$GLB,,, | Performed by: INTERNAL MEDICINE

## 2018-08-16 PROCEDURE — 3078F DIAST BP <80 MM HG: CPT | Mod: CPTII,S$GLB,, | Performed by: INTERNAL MEDICINE

## 2018-08-16 PROCEDURE — 73130 X-RAY EXAM OF HAND: CPT | Mod: 26,RT,, | Performed by: RADIOLOGY

## 2018-08-16 PROCEDURE — 3074F SYST BP LT 130 MM HG: CPT | Mod: CPTII,S$GLB,, | Performed by: INTERNAL MEDICINE

## 2018-08-17 ENCOUNTER — DOCUMENTATION ONLY (OUTPATIENT)
Dept: ORTHOPEDICS | Facility: CLINIC | Age: 73
End: 2018-08-17

## 2018-08-17 ENCOUNTER — TELEPHONE (OUTPATIENT)
Dept: ORTHOPEDICS | Facility: CLINIC | Age: 73
End: 2018-08-17

## 2018-08-17 ENCOUNTER — OFFICE VISIT (OUTPATIENT)
Dept: ORTHOPEDICS | Facility: CLINIC | Age: 73
End: 2018-08-17
Payer: MEDICARE

## 2018-08-17 VITALS
DIASTOLIC BLOOD PRESSURE: 84 MMHG | HEART RATE: 80 BPM | HEIGHT: 65 IN | SYSTOLIC BLOOD PRESSURE: 124 MMHG | BODY MASS INDEX: 30.89 KG/M2 | WEIGHT: 185.44 LBS

## 2018-08-17 DIAGNOSIS — S62.514A CLOSED NONDISPLACED FRACTURE OF PROXIMAL PHALANX OF RIGHT THUMB, INITIAL ENCOUNTER: Primary | ICD-10-CM

## 2018-08-17 PROCEDURE — 29075 APPL CST ELBW FNGR SHORT ARM: CPT | Mod: RT,S$GLB,, | Performed by: PLASTIC SURGERY

## 2018-08-17 PROCEDURE — 3079F DIAST BP 80-89 MM HG: CPT | Mod: CPTII,S$GLB,, | Performed by: PLASTIC SURGERY

## 2018-08-17 PROCEDURE — 99999 PR PBB SHADOW E&M-EST. PATIENT-LVL III: CPT | Mod: PBBFAC,,, | Performed by: PLASTIC SURGERY

## 2018-08-17 PROCEDURE — 99203 OFFICE O/P NEW LOW 30 MIN: CPT | Mod: 57,S$GLB,, | Performed by: PLASTIC SURGERY

## 2018-08-17 PROCEDURE — 3074F SYST BP LT 130 MM HG: CPT | Mod: CPTII,S$GLB,, | Performed by: PLASTIC SURGERY
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
## 2018-08-19 VITALS
HEIGHT: 65 IN | HEART RATE: 70 BPM | SYSTOLIC BLOOD PRESSURE: 110 MMHG | DIASTOLIC BLOOD PRESSURE: 78 MMHG | WEIGHT: 185.44 LBS | TEMPERATURE: 99 F | BODY MASS INDEX: 30.89 KG/M2 | OXYGEN SATURATION: 95 %
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
## 2018-08-23 ENCOUNTER — TELEPHONE (OUTPATIENT)
Dept: ORTHOPEDICS | Facility: CLINIC | Age: 73
End: 2018-08-23

## 2018-08-23 NOTE — TELEPHONE ENCOUNTER
----- Message from Joyce Murray sent at 8/23/2018 10:15 AM CDT -----  Contact: pt            Name of Who is Calling: pt      What is the request in detail: the pt is requesting a visit summary for her last visit and has questions regarding her cast. Call pt      Can the clinic reply by [facility]: yes      What Number to Call Back if not [facility]: [phone]

## 2018-08-30 ENCOUNTER — PES CALL (OUTPATIENT)
Dept: ADMINISTRATIVE | Facility: CLINIC | Age: 73
End: 2018-08-30

## 2018-09-18 ENCOUNTER — IMMUNIZATION (OUTPATIENT)
Dept: INTERNAL MEDICINE | Facility: CLINIC | Age: 73
End: 2018-09-18
Payer: MEDICARE

## 2018-09-18 PROCEDURE — 90662 IIV NO PRSV INCREASED AG IM: CPT | Mod: PBBFAC

## 2018-09-19 ENCOUNTER — OFFICE VISIT (OUTPATIENT)
Dept: ORTHOPEDICS | Facility: CLINIC | Age: 73
End: 2018-09-19
Payer: MEDICARE

## 2018-09-19 ENCOUNTER — HOSPITAL ENCOUNTER (OUTPATIENT)
Dept: RADIOLOGY | Facility: OTHER | Age: 73
Discharge: HOME | End: 2018-09-19
Attending: PLASTIC SURGERY
Payer: MEDICARE

## 2018-09-19 VITALS
HEART RATE: 71 BPM | SYSTOLIC BLOOD PRESSURE: 136 MMHG | HEIGHT: 65 IN | BODY MASS INDEX: 30.82 KG/M2 | DIASTOLIC BLOOD PRESSURE: 81 MMHG | WEIGHT: 185 LBS

## 2018-09-19 DIAGNOSIS — S62.514A CLOSED NONDISPLACED FRACTURE OF PROXIMAL PHALANX OF RIGHT THUMB, INITIAL ENCOUNTER: Primary | ICD-10-CM

## 2018-09-19 DIAGNOSIS — S62.514A CLOSED NONDISPLACED FRACTURE OF PROXIMAL PHALANX OF RIGHT THUMB, INITIAL ENCOUNTER: ICD-10-CM

## 2018-09-19 PROCEDURE — 3079F DIAST BP 80-89 MM HG: CPT | Mod: CPTII,,, | Performed by: PLASTIC SURGERY

## 2018-09-19 PROCEDURE — 73140 X-RAY EXAM OF FINGER(S): CPT | Mod: 26,RT,, | Performed by: INTERNAL MEDICINE

## 2018-09-19 PROCEDURE — 3075F SYST BP GE 130 - 139MM HG: CPT | Mod: CPTII,,, | Performed by: PLASTIC SURGERY

## 2018-09-19 PROCEDURE — 99213 OFFICE O/P EST LOW 20 MIN: CPT | Mod: PBBFAC,25 | Performed by: PLASTIC SURGERY

## 2018-09-19 PROCEDURE — 1101F PT FALLS ASSESS-DOCD LE1/YR: CPT | Mod: CPTII,,, | Performed by: PLASTIC SURGERY

## 2018-09-19 PROCEDURE — 99212 OFFICE O/P EST SF 10 MIN: CPT | Mod: S$PBB,,, | Performed by: PLASTIC SURGERY

## 2018-09-19 PROCEDURE — 99999 PR PBB SHADOW E&M-EST. PATIENT-LVL III: CPT | Mod: PBBFAC,,, | Performed by: PLASTIC SURGERY

## 2018-09-19 PROCEDURE — 73140 X-RAY EXAM OF FINGER(S): CPT | Mod: TC,FY

## 2018-09-19 NOTE — PROGRESS NOTES
"HISTORY OF PRESENT ILLNESS:  Ms. Vazquez returns today for followup of her right   thumb fracture.  She is four weeks in a cast.  She had the cast removed today   and had repeat x-rays.  She denies any pain.  Other than pain with the stiffness   that has developed after immobilization.  She has no other complaints today.    PHYSICAL EXAMINATION:  /81 (BP Location: Right arm, Patient Position: Sitting, BP Method: Large (Automatic))   Pulse 71   Ht 5' 5" (1.651 m)   Wt 83.9 kg (185 lb)   LMP  (LMP Unknown) Comment: LMP age 38  BMI 30.79 kg/m²     GENERAL APPEARANCE:  No acute distress, alert and oriented x3, cooperative, well   nourished.  EXTREMITIES:  Right upper extremities, no swelling, redness or deformities of   the right thumb.  There is minimal tenderness over the proximal phalanx to   palpation.  She has active flexion of the MCP and IP joints.  She is able to   make a composite fist.    RADIOLOGY IMPRESSION:  EXAMINATION:  XR FINGER 2 OR MORE VIEWS    CLINICAL HISTORY:  right thumb fracture;  Nondisplaced fracture of proximal phalanx of right thumb, initial encounter for closed fracture    TECHNIQUE:  Three views.  Right thumb.    COMPARISON:  August 16, 2018    FINDINGS:  Obliquely oriented lucency is again seen within the proximal phalanx of the thumb without a significant interval change in appearance.  this may represent a small nondisplaced fracture in the right clinical setting.  Minor degenerative change present.    ASSESSMENT:  Right thumb fracture.    PLAN:  It appears that the patient's fracture is well aligned.  It is not   displaced while in the cast.  I discussed that I would like to keep her   immobilized, but in a removable splint.  This was provided for her today.  She   was given a thumb spica splint to wear around the clock except for hygiene and   range of motion exercises.  She was advised to come back in four weeks with a   repeat x-ray of the thumb for reevaluation.  The " patient agreed with this above   assessment and plan and all questions and concerns were addressed prior to   discharge.      RGLUANA/ASTON  dd: 09/19/2018 11:25:11 (CDT)  td: 09/20/2018 07:24:31 (CDT)  Doc ID   #[id number]  Job ID #[id number]    CC:       Dictation Confirmation Code: [id number]  Phil Fofana Jr. MD  09/19/2018  11:23 AM

## 2018-09-25 ENCOUNTER — PATIENT MESSAGE (OUTPATIENT)
Dept: INTERNAL MEDICINE | Facility: CLINIC | Age: 73
End: 2018-09-25

## 2018-09-25 DIAGNOSIS — M35.01 SJOGREN'S SYNDROME WITH KERATOCONJUNCTIVITIS SICCA: Primary | ICD-10-CM

## 2018-09-25 DIAGNOSIS — I70.0 AORTIC ATHEROSCLEROSIS: ICD-10-CM

## 2018-09-26 RX ORDER — CEVIMELINE HYDROCHLORIDE 30 MG/1
30 CAPSULE ORAL 2 TIMES DAILY
Qty: 180 CAPSULE | Refills: 3 | Status: SHIPPED | OUTPATIENT
Start: 2018-09-26 | End: 2019-06-06 | Stop reason: SDUPTHER

## 2018-09-26 RX ORDER — ATORVASTATIN CALCIUM 20 MG/1
20 TABLET, FILM COATED ORAL DAILY
Qty: 90 TABLET | Refills: 3 | Status: SHIPPED | OUTPATIENT
Start: 2018-09-26 | End: 2019-08-30 | Stop reason: SDUPTHER

## 2018-09-27 ENCOUNTER — PATIENT MESSAGE (OUTPATIENT)
Dept: INTERNAL MEDICINE | Facility: CLINIC | Age: 73
End: 2018-09-27

## 2018-09-28 ENCOUNTER — PATIENT MESSAGE (OUTPATIENT)
Dept: ORTHOPEDICS | Facility: CLINIC | Age: 73
End: 2018-09-28

## 2018-09-28 ENCOUNTER — TELEPHONE (OUTPATIENT)
Dept: ORTHOPEDICS | Facility: CLINIC | Age: 73
End: 2018-09-28

## 2018-09-28 NOTE — TELEPHONE ENCOUNTER
Called patient to respond to concerns regarding brace and proper brace fitting. Informed pt that the top of the thumb will be exposed and to loosen the brace if numbness becomes an issue. I also advised her to be mindful of the tip of her thumb to recognize signs that need immediate attention. Pt verbalized understanding.

## 2018-10-16 ENCOUNTER — TELEPHONE (OUTPATIENT)
Dept: ORTHOPEDICS | Facility: CLINIC | Age: 73
End: 2018-10-16

## 2018-10-16 NOTE — TELEPHONE ENCOUNTER
Left voicemail reminding patient of appointment on tomorrow with Dr. Fofana and xray at 9:45. Advised patient to call us back with any questions, requests to cancel or reschedule.

## 2018-10-17 ENCOUNTER — HOSPITAL ENCOUNTER (OUTPATIENT)
Dept: RADIOLOGY | Facility: OTHER | Age: 73
Discharge: HOME | End: 2018-10-17
Attending: PLASTIC SURGERY
Payer: MEDICARE

## 2018-10-17 ENCOUNTER — OFFICE VISIT (OUTPATIENT)
Dept: ORTHOPEDICS | Facility: CLINIC | Age: 73
End: 2018-10-17
Payer: MEDICARE

## 2018-10-17 VITALS — BODY MASS INDEX: 30.82 KG/M2 | WEIGHT: 185 LBS | HEIGHT: 65 IN

## 2018-10-17 DIAGNOSIS — S62.514A CLOSED NONDISPLACED FRACTURE OF PROXIMAL PHALANX OF RIGHT THUMB, INITIAL ENCOUNTER: ICD-10-CM

## 2018-10-17 DIAGNOSIS — S62.514A CLOSED NONDISPLACED FRACTURE OF PROXIMAL PHALANX OF RIGHT THUMB, INITIAL ENCOUNTER: Primary | ICD-10-CM

## 2018-10-17 PROCEDURE — 73140 X-RAY EXAM OF FINGER(S): CPT | Mod: TC,FY

## 2018-10-17 PROCEDURE — 1101F PT FALLS ASSESS-DOCD LE1/YR: CPT | Mod: CPTII,,, | Performed by: PLASTIC SURGERY

## 2018-10-17 PROCEDURE — 99213 OFFICE O/P EST LOW 20 MIN: CPT | Mod: PBBFAC,25 | Performed by: PLASTIC SURGERY

## 2018-10-17 PROCEDURE — 99213 OFFICE O/P EST LOW 20 MIN: CPT | Mod: S$PBB,,, | Performed by: PLASTIC SURGERY

## 2018-10-17 PROCEDURE — 73140 X-RAY EXAM OF FINGER(S): CPT | Mod: 26,RT,, | Performed by: RADIOLOGY

## 2018-10-17 PROCEDURE — 99999 PR PBB SHADOW E&M-EST. PATIENT-LVL III: CPT | Mod: PBBFAC,,, | Performed by: PLASTIC SURGERY

## 2018-10-17 NOTE — PROGRESS NOTES
"HISTORY OF PRESENT ILLNESS:  Ms. Martínez returns to clinic for evaluation of her   right thumb fracture.  The patient has been wearing a removable brace around the   clock except for hygiene.  She denies any complaints of pain, swelling or   redness and she has no other complaints.    PHYSICAL EXAMINATION:  VITAL SIGNS:  Ht 5' 5" (1.651 m)   Wt 83.9 kg (185 lb)   LMP  (LMP Unknown) Comment: LMP age 38  BMI 30.79 kg/m²     GENERAL:  No acute distress, alert and oriented x3, cooperative, well nourished.  LUNGS:  Nonlabored on room air.  CARDIOVASCULAR:  Distal pulses intact.  EXTREMITIES:  Right upper extremity, no swelling, redness or deformities.  There   is minimal tenderness over the proximal phalanx of the thumb.  She has active   flexion and extension of the IP joint and MCP joint of the thumb, which are   somewhat limited secondary to stiffness.  Good capillary refill.    RADIOLOGY IMPRESSION:    EXAMINATION:  XR FINGER 2 OR MORE VIEWS    CLINICAL HISTORY:  right thumb fracture;  Nondisplaced fracture of proximal phalanx of right thumb, initial encounter for closed fracture    TECHNIQUE:  Three views of the right 1st digit    COMPARISON:  09/19/2018    FINDINGS:  Osseous density at the volar base of the 1st distal phalanx is again noted.  No significant change in appearance or position of the fragment.  No new fractures.  No radiopaque foreign bodies or focal soft tissue mass..      Impression       Unchanged osseous density at the base of the 1st distal phalanx, possible avulsion.         ASSESSMENT:  Right thumb proximal phalanx fracture.    PLAN:  Based on x-rays, the patient has completely healed, the right thumb   fracture, therefore she was advised to remove the splint and begin using the   thumb normally if she has any questions or concerns or notices any changes in   her symptoms.  She was advised to return to clinic for evaluation and treatment.    Otherwise, she may follow up p.r.n.      PINA/ASTON  dd: " 10/17/2018 11:58:11 (CDT)  td: 10/18/2018 06:12:30 (CDT)  Doc ID   #[id number]  Job ID #[id number]    CC:       Dictation Confirmation Code: [id number]  Phil Fofana Jr. MD  10/17/2018  11:55 AM

## 2018-10-20 ENCOUNTER — PATIENT MESSAGE (OUTPATIENT)
Dept: ORTHOPEDICS | Facility: CLINIC | Age: 73
End: 2018-10-20

## 2018-10-22 ENCOUNTER — PATIENT MESSAGE (OUTPATIENT)
Dept: OBSTETRICS AND GYNECOLOGY | Facility: CLINIC | Age: 73
End: 2018-10-22

## 2018-11-02 ENCOUNTER — HOSPITAL ENCOUNTER (OUTPATIENT)
Dept: RADIOLOGY | Facility: HOSPITAL | Age: 73
Discharge: HOME | End: 2018-11-02
Attending: INTERNAL MEDICINE
Payer: MEDICARE

## 2018-11-02 DIAGNOSIS — Z12.31 ENCOUNTER FOR SCREENING MAMMOGRAM FOR BREAST CANCER: ICD-10-CM

## 2018-11-02 DIAGNOSIS — Z00.00 ANNUAL PHYSICAL EXAM: ICD-10-CM

## 2018-11-02 PROCEDURE — 77063 BREAST TOMOSYNTHESIS BI: CPT | Mod: 26,HCNC,, | Performed by: RADIOLOGY

## 2018-11-02 PROCEDURE — 77067 SCR MAMMO BI INCL CAD: CPT | Mod: 26,HCNC,, | Performed by: RADIOLOGY

## 2018-11-02 PROCEDURE — 77067 SCR MAMMO BI INCL CAD: CPT | Mod: TC,HCNC

## 2018-11-02 PROCEDURE — 77063 BREAST TOMOSYNTHESIS BI: CPT | Mod: TC,HCNC

## 2018-11-12 ENCOUNTER — OFFICE VISIT (OUTPATIENT)
Dept: OBSTETRICS AND GYNECOLOGY | Facility: CLINIC | Age: 73
End: 2018-11-12
Attending: OBSTETRICS & GYNECOLOGY
Payer: MEDICARE

## 2018-11-12 VITALS
SYSTOLIC BLOOD PRESSURE: 122 MMHG | HEIGHT: 65 IN | DIASTOLIC BLOOD PRESSURE: 70 MMHG | WEIGHT: 182.88 LBS | BODY MASS INDEX: 30.47 KG/M2

## 2018-11-12 DIAGNOSIS — Z01.419 ENCOUNTER FOR ROUTINE GYNECOLOGIC EXAMINATION IN MEDICARE PATIENT: Primary | ICD-10-CM

## 2018-11-12 DIAGNOSIS — Z12.31 VISIT FOR SCREENING MAMMOGRAM: ICD-10-CM

## 2018-11-12 PROCEDURE — G0101 CA SCREEN;PELVIC/BREAST EXAM: HCPCS | Mod: HCNC,S$GLB,, | Performed by: OBSTETRICS & GYNECOLOGY

## 2018-11-12 PROCEDURE — 99999 PR PBB SHADOW E&M-EST. PATIENT-LVL III: CPT | Mod: PBBFAC,HCNC,, | Performed by: OBSTETRICS & GYNECOLOGY

## 2018-11-12 NOTE — PROGRESS NOTES
SUBJECTIVE:   73 y.o. female   for annual routine Pap and checkup. No LMP recorded (lmp unknown). Patient is postmenopausal..  She has no unusual complaints.        Past Medical History:   Diagnosis Date    Arthritis     Asymptomatic PVCs     Benign liver cyst 2012    Fibrocystic breast     left     Fibrocystic breast     left    Fibrocystic breast     right    Joint pain     Kidney stones 2012    NS (nuclear sclerosis) 2013    Osteopenia     Raynaud's disease     Rheumatoid arthritis(714.0)     Sjogren's disease      Past Surgical History:   Procedure Laterality Date    BREAST BIOPSY Bilateral     2 times - core needle right breast, biopsy left breast: fibrocystic findings    CHOLECYSTECTOMY       COLONOSCOPY N/A 10/20/2015    Procedure: COLONOSCOPY;  Surgeon: SHARRON Mcdonnell MD;  Location: [facility] (4TH FLR);  Service: Endoscopy;  Laterality: N/A;    COLONOSCOPY N/A 10/20/2015    Performed by SHARRON Mcdonnell MD at [facility] (4TH FLR)    CYST REMOVAL      right ankle - benign cyst removed at 7yrs old     CYST REMOVAL  about     cyst removed from forehead     TONSILLECTOMY, ADENOIDECTOMY      during childhood      Social History     Socioeconomic History    Marital status:      Spouse name: Not on file    Number of children: 1    Years of education: Not on file    Highest education level: Not on file   Social Needs    Financial resource strain: Not on file    Food insecurity - worry: Not on file    Food insecurity - inability: Not on file    Transportation needs - medical: Not on file    Transportation needs - non-medical: Not on file   Occupational History    Occupation: - retired   Tobacco Use    Smoking status: Never Smoker    Smokeless tobacco: Never Used   Substance and Sexual Activity    Alcohol use: Yes     Comment: limited- glass a wine a few times a year    Drug use: No    Sexual activity: No      Partners: Male     Comment:    Other Topics Concern    Are you pregnant or think you may be? No    Breast-feeding No   Social History Narrative    , has one daughter who lives in [place]. 3 grandchildren.     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      Family History   Problem Relation Age of Onset    Liver cancer Mother         liver and colon    Cancer Mother         colon, liver    Heart disease Mother         CABG    Colon cancer Father     Lung cancer Father         thinks colon was first    Cancer Father         colon, lung    Heart disease Brother         multiple bypass    Diabetes Brother     Allergies Daughter     Asthma Daughter     Asthma Grandchild     Cancer Maternal Aunt         stomach    Cancer Maternal Grandmother         breast    Breast cancer Maternal Grandmother     Heart disease Maternal Grandfather     COPD Paternal Grandfather     Cancer Maternal Aunt         thyroid    Fabry's disease Cousin     Amblyopia Neg Hx     Blindness Neg Hx     Cataracts Neg Hx     Glaucoma Neg Hx     Hypertension Neg Hx     Macular degeneration Neg Hx     Retinal detachment Neg Hx     Strabismus Neg Hx     Stroke Neg Hx     Thyroid disease Neg Hx     Melanoma Neg Hx      OB History    Para Term  AB Living   1 1 1         SAB TAB Ectopic Multiple Live Births                  # Outcome Date GA Lbr Ender/2nd Weight Sex Delivery Anes PTL Lv   1 Term                     Current Outpatient Medications   Medication Sig Dispense Refill    alendronate (FOSAMAX) 70 MG tablet Take 1 tablet (70 mg total) by mouth every 7 days. 4 tablet 11    ascorbic acid (VITAMIN C) 500 MG tablet Take 500 mg by mouth once daily.      Aspirin Child 81 mg Chew Take by mouth. 1 Tablet, Chewable Oral 4x times weekly      atorvastatin (LIPITOR) 20 MG tablet Take 1 tablet (20 mg total) by mouth once daily. 90 tablet 3    calcium citrate-vitamin D3 315-200 mg  "(CITRACAL+D) 315-200 mg-unit per tablet Take 1 tablet by mouth 2 (two) times daily.      cevimeline (EVOXAC) 30 mg capsule Take 1 capsule (30 mg total) by mouth 2 (two) times daily. 180 capsule 3    diflunisal (DOLOBID) 500 mg Tab       fluticasone (FLONASE) 50 mcg/actuation nasal spray 1  Spray each nostril Every day 3 Bottle 3    hydroxychloroquine (PLAQUENIL) 200 mg tablet Take 2 tablets (400 mg total) by mouth once daily. 180 tablet 3     No current facility-administered medications for this visit.      Allergies: Doxycycline and Sulfa (sulfonamide antibiotics)     ROS:  Constitutional: no weight loss, weight gain, fever, fatigue  Eyes:  No vision changes, glasses/contacts  ENT/Mouth: No ulcers, sinus problems, ears ringing, headache  Cardiovascular: No inability to lie flat, chest pain, exercise intolerance, swelling, heart palpitations  Respiratory: No wheezing, coughing blood, shortness of breath, or cough  Gastrointestinal: No diarrhea, bloody stool, nausea/vomiting, constipation, gas, hemorrhoids  Genitourinary: No blood in urine, painful urination, urgency of urination, frequency of urination, incomplete emptying, incontinence, abnormal bleeding, painful periods, heavy periods, vaginal discharge, vaginal odor, painful intercourse, sexual problems, bleeding after intercourse.  Musculoskeletal: No muscle weakness  Skin/Breast: No painful breasts, nipple discharge, masses, rash, ulcers  Neurological: No passing out, seizures, numbness, headache  Endocrine: No diabetes, hypothyroid, hyperthyroid, hot flashes, hair loss, abnormal hair growth, ance  Psychiatric: No depression, crying  Hematologic: No bruises, bleeding, swollen lymph nodes, anemia.      OBJECTIVE:   The patient appears well, alert, oriented x 3, in no distress.  /70   Ht 5' 5" (1.651 m)   Wt 83 kg (182 lb 14.3 oz)   LMP  (LMP Unknown) Comment: LMP age 38  BMI 30.44 kg/m²   NECK: no thyromegaly, trachea midline  SKIN: no acne, " striae, hirsutism  BREAST EXAM: breasts appear normal, no suspicious masses, no skin or nipple changes or axillary nodes  ABDOMEN: no hernias, masses, or hepatosplenomegaly  GENITALIA: normal external genitalia, no erythema, no discharge  URETHRA: normal urethra, normal urethral meatus  VAGINA: mucosal atrophy  CERVIX: no lesions or cervical motion tenderness  UTERUS: normal size, contour, position, consistency, mobility, non-tender  ADNEXA: no mass, fullness, tenderness    \  ASSESSMENT:   Reginaldo was seen today for annual exam.    Diagnoses and all orders for this visit:    Encounter for routine gynecologic examination in Medicare patient    Visit for screening mammogram  -     Mammo Digital Screening Bilat with Tomos; Future

## 2018-11-19 ENCOUNTER — PATIENT MESSAGE (OUTPATIENT)
Dept: INTERNAL MEDICINE | Facility: CLINIC | Age: 73
End: 2018-11-19

## 2018-11-19 ENCOUNTER — NURSE TRIAGE (OUTPATIENT)
Dept: ADMINISTRATIVE | Facility: CLINIC | Age: 73
End: 2018-11-19

## 2018-11-19 DIAGNOSIS — Z91.89 AT HIGH RISK FOR OSTEOPOROSIS: ICD-10-CM

## 2018-11-19 DIAGNOSIS — M85.80 OSTEOPENIA DETERMINED BY X-RAY: ICD-10-CM

## 2018-11-19 RX ORDER — ALENDRONATE SODIUM 70 MG/1
70 TABLET ORAL
Qty: 12 TABLET | Refills: 3 | Status: SHIPPED | OUTPATIENT
Start: 2018-11-19 | End: 2019-08-31 | Stop reason: SDUPTHER

## 2018-11-19 NOTE — TELEPHONE ENCOUNTER
Reason for Disposition   Can't use hand normally (e.g., hold a glass of water)    Protocols used: [facility]    Urmila has history of Sjogren's, Reynaud's, and sees Dr Figueroa.  She called due to her hands feeling cold, tingling, and said they are not weak for normal tasks, but when lifting heavy bags, there is some loss of strength.  She said she can write, hold glass, but is worried about trying to drive when she is like this.  This is not the first time for this to happen, but she said the numbness is new. Of note, there is no weakness right now, and her  just gave her some Voltaren, which seemed to help a lot. Please see the note in chart to Dr Garcia, just sent via [facility] by patient.  She wants an appt in rheumatology today.  Dr Figueroa is out on extended leave per Urmila, but she would like to be seen today by anyone in rheumatology that can see her for evaluation. Declines ED, but states she will go if worsens. Message to Jonathan Garcia MD , pcp, and Dr Figueroa's staff.  Please contact caller directly with any additional care advice, call Urmila, on her cell, at [phone].  She will have her phone with her all day today, and is waiting for a call.

## 2018-11-21 ENCOUNTER — OFFICE VISIT (OUTPATIENT)
Dept: INTERNAL MEDICINE | Facility: CLINIC | Age: 73
End: 2018-11-21
Payer: MEDICARE

## 2018-11-21 VITALS
DIASTOLIC BLOOD PRESSURE: 90 MMHG | OXYGEN SATURATION: 99 % | HEIGHT: 65 IN | WEIGHT: 184.06 LBS | BODY MASS INDEX: 30.67 KG/M2 | SYSTOLIC BLOOD PRESSURE: 130 MMHG | HEART RATE: 65 BPM

## 2018-11-21 DIAGNOSIS — M25.531 BILATERAL WRIST PAIN: ICD-10-CM

## 2018-11-21 DIAGNOSIS — M15.9 PRIMARY OSTEOARTHRITIS INVOLVING MULTIPLE JOINTS: ICD-10-CM

## 2018-11-21 DIAGNOSIS — M35.00 SJOGREN'S SYNDROME, WITH UNSPECIFIED ORGAN INVOLVEMENT: ICD-10-CM

## 2018-11-21 DIAGNOSIS — M79.642 BILATERAL HAND PAIN: Primary | ICD-10-CM

## 2018-11-21 DIAGNOSIS — M79.641 BILATERAL HAND PAIN: Primary | ICD-10-CM

## 2018-11-21 DIAGNOSIS — M25.532 BILATERAL WRIST PAIN: ICD-10-CM

## 2018-11-21 PROCEDURE — 99999 PR PBB SHADOW E&M-EST. PATIENT-LVL IV: CPT | Mod: PBBFAC,HCNC,, | Performed by: PHYSICIAN ASSISTANT

## 2018-11-21 PROCEDURE — 99213 OFFICE O/P EST LOW 20 MIN: CPT | Mod: HCNC,S$GLB,, | Performed by: PHYSICIAN ASSISTANT

## 2018-11-21 PROCEDURE — 1101F PT FALLS ASSESS-DOCD LE1/YR: CPT | Mod: CPTII,HCNC,S$GLB, | Performed by: PHYSICIAN ASSISTANT

## 2018-11-21 PROCEDURE — 3080F DIAST BP >= 90 MM HG: CPT | Mod: CPTII,HCNC,S$GLB, | Performed by: PHYSICIAN ASSISTANT

## 2018-11-21 PROCEDURE — 3075F SYST BP GE 130 - 139MM HG: CPT | Mod: CPTII,HCNC,S$GLB, | Performed by: PHYSICIAN ASSISTANT

## 2018-11-21 RX ORDER — DICLOFENAC SODIUM 10 MG/G
2 GEL TOPICAL 2 TIMES DAILY
Qty: 100 G | Refills: 0 | Status: SHIPPED | OUTPATIENT
Start: 2018-11-21 | End: 2019-05-08 | Stop reason: ALTCHOICE

## 2018-11-21 NOTE — PATIENT INSTRUCTIONS
Understanding Carpal Tunnel Syndrome    The carpal tunnel is a narrow space inside the wrist. It is ringed by bone and a band of tough tissue called the transverse carpal ligament. A major nerve called the median nerve runs from the forearm into the hand through the carpal tunnel. Tendons also run through the carpal tunnel.  With carpal tunnel syndrome, the tendons or nearby tissues within the carpal tunnel may swell or thicken. Or the transverse carpal ligament may harden and shorten. This narrows the space in the carpal tunnel and puts pressure on the median nerve. This pressure leads to tingling and numbness of the hand and wrist. In time, the condition can make even simple tasks hard to do.  What causes carpal tunnel syndrome?  Doctors arent entirely clear why the condition occurs. Certain things may make a person more likely to have it. These include:  · Being female  · Being pregnant  · Being overweight  · Having diabetes or rheumatoid arthritis  Symptoms of carpal tunnel syndrome  Symptoms often come and go. At first, symptoms may occur mainly at night. Later, they may be noticed during the day as well. They may get worse with activities such as driving, reading, typing, or holding a phone. Symptoms can include:  · Tingling and numbness in the hand or wrist  · Sharp pain that shoots up the arm or down to the fingers  · Hand stiffness or cramping, especially in the morning  · Trouble making a fist  · Hand weakness and clumsiness  Treatment for carpal tunnel syndrome  Certain treatments help reduce the pressure on the median nerve and relieve symptoms. Choices for treatment may include one or more of the following:  · Wrist splint. This involves wearing a special brace on the wrist and hand. The splint holds the wrist straight, in a neutral position. This helps keep the carpal tunnel as open as possible.  · Cortisone shots. Cortisone is a medicine that helps reduce swelling. It is injected directly into the  wrist. It helps shrink tissues inside the carpal tunnel. This relieves symptoms for a time.  · Pain medicines. You may take over-the-counter or prescription medicines to help reduce swelling and relieve symptoms.  · Surgery. If the condition doesnt respond to other treatments and doesnt go away on its own, you may need surgery. During surgery, the surgeon cuts the transverse carpal ligament to relieve pressure on the median nerve.     When to call your healthcare provider  Call your healthcare provider right away if you have any of these:  · Fever of 100.4°F (38°C) or higher, or as directed  · Symptoms that dont get better, or get worse  · New symptoms   Date Last Reviewed: 3/10/2016  © 1323-4042 [employer]. [address] [place] [address], [place] [address]. All rights reserved. This information is not intended as a substitute for professional medical care. Always follow your healthcare professional's instructions.

## 2018-11-21 NOTE — PROGRESS NOTES
"Subjective:       Patient ID: Urmila Martínez is a 73 y.o. female.    Chief Complaint: Follow-up    HPI     Established pt of Jonathan Garcia MD     Pt presents to clinic with c/o bilateral hand and wrist pain, she notes this is chronic in nature but had a flare about 3 days ago with new symptom of numbness/tingling to left hand. She states "tendons felt tight" and stiff. She tried massages, heat, and her husbands topical Voltaren gel and notes symptoms have completely resolved. She denies any inciting injury but relates onset of flare to lifting heavy groceries.     Of note recent fracture to right thumb.(8/2018), healed, released from Ortho.     She has a Sjogren's is followed by Rheumatology.    Past Medical History:   Diagnosis Date    Arthritis     Asymptomatic PVCs     Benign liver cyst 09/26/2012    Fibrocystic breast 1995    left     Fibrocystic breast 1995    left    Fibrocystic breast 1997    right    Joint pain     Kidney stones 07/20/2012    NS (nuclear sclerosis) 7/19/2013    Osteopenia     Raynaud's disease     Rheumatoid arthritis(714.0)     Sjogren's disease      Review of patient's allergies indicates:   Allergen Reactions    Doxycycline      Other reaction(s): vomit  Other reaction(s): Hives    Sulfa (sulfonamide antibiotics) Nausea And Vomiting       Current Outpatient Medications:     alendronate (FOSAMAX) 70 MG tablet, TAKE 1 TABLET (70 MG TOTAL) BY MOUTH EVERY 7 DAYS., Disp: 12 tablet, Rfl: 3    ascorbic acid (VITAMIN C) 500 MG tablet, Take 500 mg by mouth once daily., Disp: , Rfl:     Aspirin Child 81 mg Chew, Take by mouth. 1 Tablet, Chewable Oral 4x times weekly, Disp: , Rfl:     atorvastatin (LIPITOR) 20 MG tablet, Take 1 tablet (20 mg total) by mouth once daily., Disp: 90 tablet, Rfl: 3    calcium citrate-vitamin D3 315-200 mg (CITRACAL+D) 315-200 mg-unit per tablet, Take 1 tablet by mouth 2 (two) times daily., Disp: , Rfl:     cevimeline (EVOXAC) 30 mg capsule, Take " "1 capsule (30 mg total) by mouth 2 (two) times daily., Disp: 180 capsule, Rfl: 3    diflunisal (DOLOBID) 500 mg Tab, , Disp: , Rfl:     fluticasone (FLONASE) 50 mcg/actuation nasal spray, 1  Spray each nostril Every day, Disp: 3 Bottle, Rfl: 3    hydroxychloroquine (PLAQUENIL) 200 mg tablet, Take 2 tablets (400 mg total) by mouth once daily., Disp: 180 tablet, Rfl: 3        Review of Systems   Constitutional: Negative for chills, diaphoresis, fatigue, fever and unexpected weight change.   Eyes: Negative for visual disturbance.   Respiratory: Negative for cough, shortness of breath and wheezing.    Cardiovascular: Negative for chest pain and leg swelling.   Gastrointestinal: Negative for abdominal pain, constipation, nausea and vomiting.   Musculoskeletal: Positive for arthralgias.   Skin: Negative for rash.   Neurological: Positive for numbness. Negative for weakness, light-headedness and headaches.       Objective: BP (!) 130/90   Pulse 65   Ht 5' 5" (1.651 m)   Wt 83.5 kg (184 lb 1.4 oz)   LMP  (LMP Unknown) Comment: LMP age 38  SpO2 99%   BMI 30.63 kg/m²         Physical Exam   Constitutional: She is oriented to person, place, and time. She appears well-developed and well-nourished. No distress.   HENT:   Head: Normocephalic and atraumatic.   Mouth/Throat: Oropharynx is clear and moist.   Eyes: Pupils are equal, round, and reactive to light.   Cardiovascular: Normal rate and regular rhythm. Exam reveals no friction rub.   No murmur heard.  Pulmonary/Chest: Effort normal and breath sounds normal. She has no wheezes. She has no rales.   Abdominal: Soft. Bowel sounds are normal. There is no tenderness.   Musculoskeletal: Normal range of motion.        Right wrist: She exhibits normal range of motion, no tenderness, no bony tenderness, no swelling and no deformity.        Left wrist: She exhibits normal range of motion, no tenderness, no bony tenderness, no swelling and no deformity.        Left hand: She " exhibits normal range of motion, no bony tenderness, normal capillary refill and no swelling. Normal sensation noted. Normal strength noted.   Negative Tinel's b/l  Positive Phalen's to right wrist   Neurological: She is alert and oriented to person, place, and time. She has normal strength. No cranial nerve deficit.   Skin: Skin is warm and dry. Capillary refill takes less than 2 seconds. No rash noted.   Psychiatric: She has a normal mood and affect.   Vitals reviewed.      Assessment:       1. Bilateral hand pain    2. Bilateral wrist pain    3. Primary osteoarthritis involving multiple joints    4. Sjogren's syndrome, with unspecified organ involvement        Plan:             Urmila was seen today for follow-up.    Diagnoses and all orders for this visit:    Bilateral hand pain  -     diclofenac sodium (VOLTAREN) 1 % Gel; Apply 2 g topically 2 (two) times daily.    Bilateral wrist pain  -     diclofenac sodium (VOLTAREN) 1 % Gel; Apply 2 g topically 2 (two) times daily.    Primary osteoarthritis involving multiple joints    Sjogren's syndrome, with unspecified organ involvement      Suspect arthritis and possible CTS  Advised on conservative measures  NSAIDs prn, activity modification, and OTC bilateral wrist splint nightly  Follow up as needed and with PCP    Nisha Daniel PA-C

## 2018-11-29 ENCOUNTER — PATIENT MESSAGE (OUTPATIENT)
Dept: INTERNAL MEDICINE | Facility: CLINIC | Age: 73
End: 2018-11-29

## 2018-11-29 NOTE — TELEPHONE ENCOUNTER
Reviewed chart - UC visit note states symptoms resolved with the voltaren, but patient's message says joint pain/symptoms still present (or flared again after sitting in ER waiting room) though the voltaren has helped.    Would recommend continuing to use the voltaren, will forward patient's message to her rheumatologist, and in the meantime would recommend close follow up with me for re-evaluation.

## 2018-11-30 ENCOUNTER — LAB VISIT (OUTPATIENT)
Dept: LAB | Facility: HOSPITAL | Age: 73
End: 2018-11-30
Attending: INTERNAL MEDICINE
Payer: MEDICARE

## 2018-11-30 ENCOUNTER — OFFICE VISIT (OUTPATIENT)
Dept: INTERNAL MEDICINE | Facility: CLINIC | Age: 73
End: 2018-11-30
Payer: MEDICARE

## 2018-11-30 VITALS
WEIGHT: 184.31 LBS | TEMPERATURE: 98 F | HEIGHT: 65 IN | BODY MASS INDEX: 30.71 KG/M2 | DIASTOLIC BLOOD PRESSURE: 82 MMHG | OXYGEN SATURATION: 96 % | SYSTOLIC BLOOD PRESSURE: 139 MMHG | HEART RATE: 71 BPM

## 2018-11-30 DIAGNOSIS — M77.9 TENDONITIS: ICD-10-CM

## 2018-11-30 DIAGNOSIS — M12.9 ARTHRITIS, MULTIPLE JOINT INVOLVEMENT: ICD-10-CM

## 2018-11-30 DIAGNOSIS — M35.00 SJOGREN'S SYNDROME, WITH UNSPECIFIED ORGAN INVOLVEMENT: Primary | ICD-10-CM

## 2018-11-30 DIAGNOSIS — M35.00 SJOGREN'S SYNDROME, WITH UNSPECIFIED ORGAN INVOLVEMENT: ICD-10-CM

## 2018-11-30 LAB
ANION GAP SERPL CALC-SCNC: 6 MMOL/L
BUN SERPL-MCNC: 16 MG/DL
CALCIUM SERPL-MCNC: 9.4 MG/DL
CHLORIDE SERPL-SCNC: 109 MMOL/L
CO2 SERPL-SCNC: 29 MMOL/L
CREAT SERPL-MCNC: 0.9 MG/DL
CRP SERPL-MCNC: 5.6 MG/L
ERYTHROCYTE [DISTWIDTH] IN BLOOD BY AUTOMATED COUNT: 11.9 %
ERYTHROCYTE [SEDIMENTATION RATE] IN BLOOD BY WESTERGREN METHOD: 16 MM/HR
EST. GFR  (AFRICAN AMERICAN): >60 ML/MIN/1.73 M^2
EST. GFR  (NON AFRICAN AMERICAN): >60 ML/MIN/1.73 M^2
GLUCOSE SERPL-MCNC: 96 MG/DL
HCT VFR BLD AUTO: 38.7 %
HGB BLD-MCNC: 12.7 G/DL
MCH RBC QN AUTO: 30.6 PG
MCHC RBC AUTO-ENTMCNC: 32.8 G/DL
MCV RBC AUTO: 93 FL
PLATELET # BLD AUTO: 215 K/UL
PMV BLD AUTO: 10.4 FL
POTASSIUM SERPL-SCNC: 4 MMOL/L
RBC # BLD AUTO: 4.15 M/UL
SODIUM SERPL-SCNC: 144 MMOL/L
WBC # BLD AUTO: 6.27 K/UL

## 2018-11-30 PROCEDURE — 85027 COMPLETE CBC AUTOMATED: CPT

## 2018-11-30 PROCEDURE — 80048 BASIC METABOLIC PNL TOTAL CA: CPT

## 2018-11-30 PROCEDURE — 1101F PT FALLS ASSESS-DOCD LE1/YR: CPT | Mod: CPTII,HCNC,S$GLB, | Performed by: INTERNAL MEDICINE

## 2018-11-30 PROCEDURE — 86140 C-REACTIVE PROTEIN: CPT

## 2018-11-30 PROCEDURE — 85652 RBC SED RATE AUTOMATED: CPT

## 2018-11-30 PROCEDURE — 36415 COLL VENOUS BLD VENIPUNCTURE: CPT

## 2018-11-30 PROCEDURE — 3075F SYST BP GE 130 - 139MM HG: CPT | Mod: CPTII,HCNC,S$GLB, | Performed by: INTERNAL MEDICINE

## 2018-11-30 PROCEDURE — 99999 PR PBB SHADOW E&M-EST. PATIENT-LVL III: CPT | Mod: PBBFAC,HCNC,, | Performed by: INTERNAL MEDICINE

## 2018-11-30 PROCEDURE — 3079F DIAST BP 80-89 MM HG: CPT | Mod: CPTII,HCNC,S$GLB, | Performed by: INTERNAL MEDICINE

## 2018-11-30 PROCEDURE — 99214 OFFICE O/P EST MOD 30 MIN: CPT | Mod: HCNC,S$GLB,, | Performed by: INTERNAL MEDICINE

## 2018-11-30 NOTE — PROGRESS NOTES
Subjective:       Patient ID: Urmila DAN White is a 73 y.o. female.    Chief Complaint: Pain    HPI  72 y/o woman with Sjogren's, inflammatory arthritis, OA, osteopenia, h/o nephrolithiasis here for urgent visit for pain in multiple joints.    Chronic hand/wrist pain with flare around 11/18 after stress on hands from lifting heavy grocery bags, seen for urgent care visit 11/21/18  Gets flares similar to this sometimes but this is worse and more persistent than her usual. Having some numbness in finger and thumb in addition to pain.  Taking aleve for this, also prescribed diclofenac gel at  visit 11/21. Using cold pack and heating pad - feels that cold helps her hands, heat on elbows helps with elbow/forearm/hand pain.  Tried using wrist splints at night but felt this made her symptoms worse.   On message 11/29 also reported pain in forearms, elbows, shoulders, hips along with hands and wrists.  As of when she woke up today, the pain has mostly improved in shoulders, hips, and elbows. Still having some pain/discomfort in both hands/wrists -- pain with pinching especially in L hand, down flexor tendons of 2nd/3rd fingers through wrist to volar surface of forearm. Pain in R dorsum of thumb through radial side of wrist.  Numbness has improved, now describes as a vague numbness under fingernail of index finger, small area of radial side of index finger, and radial side of thumb.  No redness or swelling at present or when she is having significant symptoms.  Stiffness in morning improves with about 15 minutes of gradually increasing movement of joints (walking, moving hand/wrist).    Follows with Dr Figueroa for her Sjogrens and h/o inflammatory arthritis; he is out of clinic but Rheumatology is working on getting her an appointment.    Review of Systems   Constitutional: Negative for unexpected weight change.   HENT: Negative.    Eyes: Negative for visual disturbance.   Respiratory: Negative for cough and shortness of  "breath.    Cardiovascular: Negative for chest pain and leg swelling (none recently; does get this intermittently).   Gastrointestinal: Negative for abdominal pain, diarrhea and nausea.   Endocrine: Negative.    Genitourinary: Negative.    Musculoskeletal: Positive for arthralgias. Negative for back pain, gait problem and joint swelling.   Skin: Negative for color change and rash.   Neurological: Positive for numbness. Negative for weakness.   Psychiatric/Behavioral: Negative for dysphoric mood. The patient is nervous/anxious (relatd to symptoms).          Past medical history, surgical history, and family medical history reviewed and updated as appropriate.    Medications and allergies reviewed.     Objective:          Vitals:    11/30/18 1359   BP: 139/82   BP Location: Left arm   Patient Position: Sitting   BP Method: Large (Manual)   Pulse: 71   Temp: 98.2 °F (36.8 °C)   TempSrc: Oral   SpO2: 96%   Weight: 83.6 kg (184 lb 4.9 oz)   Height: 5' 5" (1.651 m)     Body mass index is 30.67 kg/m².  Physical Exam   Constitutional: She is oriented to person, place, and time. She appears well-developed and well-nourished.   HENT:   Head: Normocephalic and atraumatic.   Mouth/Throat: Oropharynx is clear and moist.   Eyes: Conjunctivae and EOM are normal. Pupils are equal, round, and reactive to light.   Neck: Normal range of motion. Neck supple.   Cardiovascular: Normal rate, regular rhythm, normal heart sounds and intact distal pulses.   No murmur heard.  Mild varicose / spider veins on legs, nontender   Pulmonary/Chest: Effort normal and breath sounds normal. No respiratory distress.   Abdominal: Soft. There is no tenderness.   Musculoskeletal: She exhibits tenderness (mild along fingers/wrist). She exhibits no edema.   Neurological: She is alert and oriented to person, place, and time. She has normal strength. No cranial nerve deficit or sensory deficit. Gait normal.   Skin: Skin is warm and dry. No rash noted. No " erythema.   Psychiatric: She has a normal mood and affect.   Vitals reviewed.      Lab Results   Component Value Date    WBC 5.75 07/17/2018    HGB 13.6 07/17/2018    HCT 39.8 07/17/2018     07/17/2018    CHOL 160 07/17/2018    TRIG 113 07/17/2018    HDL 44 07/17/2018    ALT 17 07/17/2018    AST 24 07/17/2018     07/17/2018    K 4.6 07/17/2018     07/17/2018    CREATININE 0.9 07/17/2018    BUN 18 07/17/2018    CO2 25 07/17/2018    TSH 2.712 07/17/2018       Assessment:       1. Sjogren's syndrome, with unspecified organ involvement    2. Tendonitis    3. Arthritis, multiple joint involvement        Plan:   Urmila was seen today for pain.    Diagnoses and all orders for this visit:    Sjogren's syndrome, with unspecified organ involvement  -     CBC Without Differential; Future  -     Basic metabolic panel; Future  -     Sedimentation rate; Future  -     C-reactive protein; Future    Tendonitis  -     CBC Without Differential; Future  -     Basic metabolic panel; Future  -     Sedimentation rate; Future  -     C-reactive protein; Future    Arthritis, multiple joint involvement  -     CBC Without Differential; Future  -     Basic metabolic panel; Future  -     Sedimentation rate; Future  -     C-reactive protein; Future    Suspect flare of inflammatory arthritis, but this has recently improved; will check labs -- if inflammatory markers elevated may merit short-term treatment with steroid but for now will continue NSAID  Follow up with rheumatology as soon as possible  OK to use diflunisal as needed for joint flares; can hold fosamax while taking diflunisal (concern re: interaction / stomach problems)    Health maintenance reviewed - [facility]  Labs today    Follow-up if symptoms worsen or fail to improve.    Jonathan Garcia MD  Internal Medicine  [facility]  11/30/2018

## 2018-11-30 NOTE — PATIENT INSTRUCTIONS
Ok to continue to use the diflunisal as needed for joint pain flares.  If you are concerned about stomach problems when taking this in addition to the fosamax, you can hold on taking your dose of fosamax when you are taking the diflunisal.

## 2018-12-03 ENCOUNTER — PATIENT MESSAGE (OUTPATIENT)
Dept: RHEUMATOLOGY | Facility: CLINIC | Age: 73
End: 2018-12-03

## 2018-12-07 ENCOUNTER — PATIENT MESSAGE (OUTPATIENT)
Dept: INTERNAL MEDICINE | Facility: CLINIC | Age: 73
End: 2018-12-07

## 2018-12-10 ENCOUNTER — TELEPHONE (OUTPATIENT)
Dept: INTERNAL MEDICINE | Facility: CLINIC | Age: 73
End: 2018-12-10

## 2018-12-10 NOTE — TELEPHONE ENCOUNTER
Pt still having problems with her left arm. Pt states she will like to see a specialist for her shoulder arm, and hand. Please advise and place referral if appropriate.   Pt states she can not wait until 02/04/19.

## 2018-12-10 NOTE — TELEPHONE ENCOUNTER
----- Message from Beth Padilla sent at 12/10/2018  8:16 AM CST -----  Contact: patient [phone] preferrred/  [phone] cell / leave message  Pt needs a referral to a doctor for nerve damage to ligaments in her arm. Patient said that she sent a message on the patient portal with all of the information.

## 2018-12-12 RX ORDER — HYDROXYCHLOROQUINE SULFATE 200 MG/1
TABLET, FILM COATED ORAL
Qty: 180 TABLET | Refills: 0 | Status: SHIPPED | OUTPATIENT
Start: 2018-12-12 | End: 2019-03-04 | Stop reason: SDUPTHER

## 2018-12-13 ENCOUNTER — HOSPITAL ENCOUNTER (OUTPATIENT)
Dept: RADIOLOGY | Facility: HOSPITAL | Age: 73
Discharge: HOME | End: 2018-12-13
Attending: STUDENT IN AN ORGANIZED HEALTH CARE EDUCATION/TRAINING PROGRAM
Payer: MEDICARE

## 2018-12-13 ENCOUNTER — OFFICE VISIT (OUTPATIENT)
Dept: RHEUMATOLOGY | Facility: CLINIC | Age: 73
End: 2018-12-13
Payer: MEDICARE

## 2018-12-13 VITALS
HEIGHT: 65 IN | WEIGHT: 184.75 LBS | DIASTOLIC BLOOD PRESSURE: 82 MMHG | SYSTOLIC BLOOD PRESSURE: 156 MMHG | HEART RATE: 65 BPM | BODY MASS INDEX: 30.78 KG/M2

## 2018-12-13 DIAGNOSIS — M06.9 RHEUMATOID ARTHRITIS, INVOLVING UNSPECIFIED SITE, UNSPECIFIED RHEUMATOID FACTOR PRESENCE: Primary | ICD-10-CM

## 2018-12-13 DIAGNOSIS — M15.9 PRIMARY OSTEOARTHRITIS INVOLVING MULTIPLE JOINTS: ICD-10-CM

## 2018-12-13 DIAGNOSIS — Z86.69 HISTORY OF NERVE IMPINGEMENT: ICD-10-CM

## 2018-12-13 DIAGNOSIS — Z79.899 HIGH RISK MEDICATION USE: ICD-10-CM

## 2018-12-13 DIAGNOSIS — M06.9 RHEUMATOID ARTHRITIS INVOLVING MULTIPLE SITES, UNSPECIFIED RHEUMATOID FACTOR PRESENCE: ICD-10-CM

## 2018-12-13 DIAGNOSIS — M35.00 SJOGREN'S SYNDROME, WITH UNSPECIFIED ORGAN INVOLVEMENT: Primary | ICD-10-CM

## 2018-12-13 DIAGNOSIS — M85.80 OSTEOPENIA DETERMINED BY X-RAY: ICD-10-CM

## 2018-12-13 DIAGNOSIS — G56.00 CARPAL TUNNEL SYNDROME, UNSPECIFIED LATERALITY: ICD-10-CM

## 2018-12-13 PROCEDURE — 73030 X-RAY EXAM OF SHOULDER: CPT | Mod: 26,LT,, | Performed by: RADIOLOGY

## 2018-12-13 PROCEDURE — 1101F PT FALLS ASSESS-DOCD LE1/YR: CPT | Mod: CPTII,HCNC,GC,S$GLB | Performed by: STUDENT IN AN ORGANIZED HEALTH CARE EDUCATION/TRAINING PROGRAM

## 2018-12-13 PROCEDURE — 99214 OFFICE O/P EST MOD 30 MIN: CPT | Mod: HCNC,GC,S$GLB, | Performed by: STUDENT IN AN ORGANIZED HEALTH CARE EDUCATION/TRAINING PROGRAM

## 2018-12-13 PROCEDURE — 73030 X-RAY EXAM OF SHOULDER: CPT | Mod: TC,LT

## 2018-12-13 PROCEDURE — 99999 PR PBB SHADOW E&M-EST. PATIENT-LVL V: CPT | Mod: PBBFAC,GC,, | Performed by: STUDENT IN AN ORGANIZED HEALTH CARE EDUCATION/TRAINING PROGRAM

## 2018-12-13 PROCEDURE — 3077F SYST BP >= 140 MM HG: CPT | Mod: CPTII,HCNC,GC,S$GLB | Performed by: STUDENT IN AN ORGANIZED HEALTH CARE EDUCATION/TRAINING PROGRAM

## 2018-12-13 PROCEDURE — 3079F DIAST BP 80-89 MM HG: CPT | Mod: CPTII,HCNC,GC,S$GLB | Performed by: STUDENT IN AN ORGANIZED HEALTH CARE EDUCATION/TRAINING PROGRAM

## 2018-12-13 RX ORDER — MELOXICAM 7.5 MG/1
7.5 TABLET ORAL DAILY
Qty: 30 TABLET | Refills: 2 | Status: SHIPPED | OUTPATIENT
Start: 2018-12-13 | End: 2019-01-12

## 2018-12-13 RX ORDER — PREDNISONE 20 MG/1
20 TABLET ORAL DAILY
Qty: 7 TABLET | Refills: 0 | Status: SHIPPED | OUTPATIENT
Start: 2018-12-13 | End: 2018-12-20

## 2018-12-13 RX ORDER — CELECOXIB 100 MG/1
100 CAPSULE ORAL 2 TIMES DAILY
Qty: 60 CAPSULE | Refills: 2 | Status: SHIPPED | OUTPATIENT
Start: 2018-12-13 | End: 2018-12-13

## 2018-12-13 NOTE — PROGRESS NOTES
Pharmacy called stating patient is allergic to sulfa.  Celebrex cancelled and meloxicam 7.5mg Qday ordered.      Spoke with pharmacist on staff and aware of change.

## 2018-12-13 NOTE — PROGRESS NOTES
73 year old female with sjogren's syndrome  Seropositive RA  Raynaud's+    On plaquenil  Eye exam June 2018  On evoxac    Sicca symptoms stable  Raynaud's managed conservatively    Joints do well but had a flare nov    Left arm discomfort since nov Nov RA flare for 3 days  All joints got better  Left shoulder pain persists  She used a brace for hand and it made the left wrist/hand pain worse    Left shoulder impinges  Pain and numbness middle finger    Types a lot    Sleeps on the sides    Takes NSAIDs,helps    Worse in the mornings    Exam :left shoulder impingement  Neck exam normal    Recs    Inject left shoulder  Xray left shoulder   PT shoulder    EMG/NCS left arm    CT soft splint brace  OT  If no improvement,rtc  NSAIDs vs steroids oral vs injections

## 2018-12-13 NOTE — PATIENT INSTRUCTIONS
Please take   Prednisone 20mg for one week - please do not take any NSAIDs (Diflunisal, Celebrex) during this time  If you continue to have pain/discomfort in the affected area, please start taking Celebrex 100mg (up to twice day) for pain.  Please discontinue when pain resolves.     Soft hand splint for carpal tunnel.

## 2018-12-21 ENCOUNTER — PATIENT MESSAGE (OUTPATIENT)
Dept: RHEUMATOLOGY | Facility: CLINIC | Age: 73
End: 2018-12-21

## 2018-12-21 NOTE — TELEPHONE ENCOUNTER
Patient was able to get in to see rheumatology on 12/13, as appropriate for her joint pain.  Mobic and prednisone were added, +hand splint for carpal tunnel  Diflunisal was stopped  She was referred to PT and OT.    She sent email message today noting improvement.

## 2019-01-03 ENCOUNTER — PATIENT MESSAGE (OUTPATIENT)
Dept: RHEUMATOLOGY | Facility: CLINIC | Age: 74
End: 2019-01-03

## 2019-01-10 ENCOUNTER — TELEPHONE (OUTPATIENT)
Dept: NEUROLOGY | Facility: CLINIC | Age: 74
End: 2019-01-10

## 2019-01-10 NOTE — TELEPHONE ENCOUNTER
----- Message from Linda Miguel sent at 1/10/2019  9:12 AM CST -----  Contact: Pt  Patient Returning Call from [facility]    Who Left Message for Patient: Pt is unsure.   Communication Preference: [phone]  Additional Information: N/A

## 2019-01-11 ENCOUNTER — CLINICAL SUPPORT (OUTPATIENT)
Dept: REHABILITATION | Facility: HOSPITAL | Age: 74
End: 2019-01-11
Payer: MEDICARE

## 2019-01-11 DIAGNOSIS — R20.0 NUMBNESS: ICD-10-CM

## 2019-01-11 DIAGNOSIS — M54.12 CERVICAL RADICULOPATHY: ICD-10-CM

## 2019-01-11 DIAGNOSIS — R29.3 POSTURE IMBALANCE: ICD-10-CM

## 2019-01-11 PROCEDURE — G8988 SELF CARE GOAL STATUS: HCPCS | Mod: CJ,HCNC,PO

## 2019-01-11 PROCEDURE — 97162 PT EVAL MOD COMPLEX 30 MIN: CPT | Mod: HCNC,PO

## 2019-01-11 PROCEDURE — 97165 OT EVAL LOW COMPLEX 30 MIN: CPT | Mod: HCNC,PO

## 2019-01-11 PROCEDURE — 97110 THERAPEUTIC EXERCISES: CPT | Mod: HCNC,PO

## 2019-01-11 PROCEDURE — 97018 PARAFFIN BATH THERAPY: CPT | Mod: HCNC,PO

## 2019-01-11 PROCEDURE — G8987 SELF CARE CURRENT STATUS: HCPCS | Mod: CK,HCNC,PO

## 2019-01-11 NOTE — PROGRESS NOTES
Physical Therapy Evaluation    Name: Urmila Martínez  [facility] Number: [id number]    Medical Diagnosis: nerve impingement  PT Diagnosis:   Encounter Diagnoses   Name Primary?    Cervical radiculopathy     Posture imbalance      Physician: Ana Li, *  Treatment Orders: PT Eval and Treat    History     Past Medical History:   Diagnosis Date    Arthritis     Asymptomatic PVCs     Benign liver cyst 09/26/2012    Fibrocystic breast 1995    left     Fibrocystic breast 1995    left    Fibrocystic breast 1997    right    Joint pain     Kidney stones 07/20/2012    NS (nuclear sclerosis) 7/19/2013    Osteopenia     Raynaud's disease     Rheumatoid arthritis(714.0)     Sjogren's disease      Current Outpatient Medications   Medication Sig    alendronate (FOSAMAX) 70 MG tablet TAKE 1 TABLET (70 MG TOTAL) BY MOUTH EVERY 7 DAYS.    ascorbic acid (VITAMIN C) 500 MG tablet Take 500 mg by mouth once daily.    Aspirin Child 81 mg Chew Take by mouth. 1 Tablet, Chewable Oral 4x times weekly    atorvastatin (LIPITOR) 20 MG tablet Take 1 tablet (20 mg total) by mouth once daily.    calcium citrate-vitamin D3 315-200 mg (CITRACAL+D) 315-200 mg-unit per tablet Take 1 tablet by mouth 2 (two) times daily.    cevimeline (EVOXAC) 30 mg capsule Take 1 capsule (30 mg total) by mouth 2 (two) times daily.    diclofenac sodium (VOLTAREN) 1 % Gel Apply 2 g topically 2 (two) times daily.    fluticasone (FLONASE) 50 mcg/actuation nasal spray 1  Spray each nostril Every day    hydroxychloroquine (PLAQUENIL) 200 mg tablet TAKE 2 TABLETS ONCE DAILY    meloxicam (MOBIC) 7.5 MG tablet Take 1 tablet (7.5 mg total) by mouth once daily.    miscellaneous medical supply Pack 1 soft L hand splint for carpal tunnel     No current facility-administered medications for this visit.      Review of patient's allergies indicates:   Allergen Reactions    Doxycycline      Other reaction(s): vomit  Other reaction(s): Hives    Sulfa  (sulfonamide antibiotics) Nausea And Vomiting       Evaluation Date: 1/11/19  Visit # authorized: 1  Authorization period: 12/13/19  Plan of care Expiration: 3/10/19    Subjective     History of present condition:  Urmila is a 73 y.o. female that presents to [facility] secondary to nerve impingeemtn. Injury/surgery occurred approximately: ~2 months ago. Pt. presents with the following co-morbidities and personal factors that directly impact her plan of care: RA and Sjogren's disease.        Precautions: Sjorgren's disease, RA, standard    Onset/STEVE: sudden; In November pt. had an arthritis flare-up that started in the left shoulder, elbow, and wrist went to the PCP who referred to the rheumatologist in December. By the time she made it to the neurologist she was not able to make a fist bilaterally. N&T was then in the elbow radiating down to the hand. Current symptoms include left anterolateral shoulder, posterior olecranon, left wrist (inferior to ulnar styloid) pain; and pulling in left forearm flexors. Most of the time the numbness in the entire hand often 1st three digits or the last three digits. Pt. wakes with the pain, improves with movement, and worsens towards the end of the day.     Red Flags:  · Bowel/bladder symptoms (urinary retention/fecal incontinence)? No  · Recent weight loss? No.  · Constant/Night pain that is unchanging with change of position? No.  · PMH of CA? No.   · Numbness or Tingling? yes, median nerve distribution primarily  · Vertebral artery symptoms: Pt. denies symptoms of dizziness, blurred vision, double vision, tinnitus, and nausea    Aggravating factors: pulling on sheets/bedding,cold weather, reaching OH (due to an occasional catch in the shoulder), gripping (holding onto objects), repetitive lifting, and lying supine without a pillow under arm  Relieving factors: aquatic exercise only, heating pad and prednisone  Pain Scale: Urmila rates pain on a scale of 0-10  to be 4 at currently; 0 at best; 6 at worst.    Diagnostic tests: left shoulder x-ray revealed Mild DJD.  No fracture or dislocation.  No bone destruction identified.    PLOF: lives at home with ; household activities, errands, EFC:Aquatic therapy: 3x/wk: learned from PT at [facility]  DME own/use: none  Hand dominance: right  Occupation: Pt is retired    Previous treatment: none other than medication: prednisone.  ADLs: Pt has a decrease ability to perform ADLs such as see above.    Patient Goals: Pt would like to rid of pain, increased mobility in left shoulder, and increase strength    Objective     Observation: deconditioned    Posture: subcranial hyperextension, cervical flattened lordosis, FHP, mild Tspine kyphosis, bilateral rolled anterior/IR shoulders, bilateral shoulder protraction, and left 1st rib elevated    Cervical ROM: (measured in degrees)    Degrees Quality   Flexion 43    Right SB 30    Left SB 30    Right rotation 75    Left rotation 70      Shoulder Active/ Passive ROM: (measured in degrees)   Shoulder Right UE Left UE   Flexion  WNLs WNLs   Abduction WNLs WNLs   ER WNLs WNLs   IR WNLs WNLs     Sensation: Dermatomes: Impaired: left C5 numbness     Reflexes: C5/T1: 2 bilaterally    Strength: (measured in neutral spine, gradin-5 out of 5)   Cervical MMT   Flexion 3/5   Extension 3/5   Right Side Bend 3/5   Left Side Bend 3/5   Upper trap. 4+/5     Upper Extremity Strength: (grading 1-5 out of 5)      Right UE Left UE   Shoulder Flexion: 4/5 4-/5   Shoulder Abduction: 4/5 4-/5   Shoulder ER: 4/5 4-/5   Shoulder IR: 4/5 4-/5        Elbow Flexion: 4+/5 4/5   Elbow Extension: 4+/5 4/5        Wrist flexion: 4+/5 4+/5   Wrist extension: 4+/5 4+/5        Gross grasp 5 4+        Lower Trap: seated 3-/5 3-/5 posterior shoulder   Middle Trap: 3/5 3/5   Rhomboids: 3-/5 3-/5     Cervical Spine Special Tests: ((+): positive; (-): negative)   Spurling's A + left   Spurling's B + left   First Rib  +left: 2/6     Nerve Right Left Quality   Median - - strong stretch   Ulnar + +  numbness   Radial - - strong stretch   + drop arm for pain not inability to hold    Cervical musculature flexibility: (restriction: mild, mod.: moderate, severe grading)   Muscle Flexibility   suboccipitals    upper trapezius    levator scapula    middle scalenes    posterior scalenes      Palpation for condition:   · Position:       · Warmth:   · Swelling:   · Texture:     Joint Mobility: (graded 0-6 out of 6) left 1st rib: 2/6; left lower cervical general/specific downslides: 2/6    Outcome measures:   Functional Status Measures:    Intake Score     Pts Physical FS Primary Measure      59                          Risk Adjustment Statistical FOTO     48        PT reviewed FOTO scores for Urmila Martínez on 01/11/2019.   FOTO scores were entered into EPIC - see media section.    History  Co-morbidities and personal factors that may impact the plan of care Examination  Body Structures and Functions, activity limitations and participation restrictions that may impact the plan of care Clinical Presentation   Decision Making/ Complexity Score   Co-morbidities:   chronicity of condition and CTS            Personal Factors:   none Body Regions: neck, left shoulder, and bilateral wrists    Body Systems: Decreased bilateral shoulder AROM; shoulder/adrianna-scapular weakness; poor posture; pain with transitional activities, decrease exercise ability, and pain with ADLs.     Activity limitations: lifting/reaching OH, gripping, household tasks, lying supine without arm supported, sleep disturbance    Participation Restrictions: none evolving with changing clinical characteristics   moderate     Clinical Presentation/complexity category  Moderate complexity category: pt. has 1-2 personal factors and/or co-morbidities directly  impacting POC, 3 or more body system impairments/functional limitations/participation restrictions; as well as, condition is  "evolving with changing clinical characteristics.    PT Evaluation Completed? Yes  Discussed Plan of Care with patient: Yes    TREATMENT:  Therapeutic exercise: Urmila received therapeutic exercises to develop strength and endurance, flexibility for 10 minutes including: View at "[email or web address]-[email or web address]" using code: [id number]   *Pt verbally understood the instructions and demonstrated proper form/technique. Pt was advised to perform these exercises free of pain and to discontinue use if symptoms worsen.    Pt. Education: Instructed pt. regarding: HEP including proper form/technique; body mechanics, and posture re-education x 5min.. Pt demonstrated and verbalized good understanding of the education provided. Urmila demonstrated good return demonstration of activities.  · No cultural, environmental, or spiritual barriers identified to treatment or learning.    Medical necessity is demonstrated by the following IMPAIRMENTS/PROBLEM LIST:   1) Pain limiting function   2) Postural dysfunction   3) Longus colli/suboccipital tightness   4) Upper trapezius/levator scapula tightness   5) Longus colli/scapula stabilizer weakness    6) Decreased cervical/shoulder ROM    7) Decreased cervical and thoracic joint mobility   8) Decreased UT/levator scapula/scalenes soft tissue extensibility   9) Lack of HEP   10) UE paresthesia: left elbow/wrist/hand    GOALS:   Short Term Goals: 4 weeks  1. Pt. to be independent with symptom management  2. Pt. to demonstrate increased MMT for cervical paraspinals to 3/5 to improve tolerance to upright unsupported sitting posture.  3. Pt to tolerate HEP to improve ROM and independence with ADLs  4. Pt. to report a decrease in UE paresthesia by 25% with OH reach activities    Long Term Goals: 8 weeks  1. Pt. to report decreased left shoulder pain </ =  2/10 at worst to increase tolerance for upright unsupported sitting posture  2. Pt. to demonstrate proper cervical and scapula retraction requiring no " verbal cues from PT.  3. Increase bilateral lower cervical joint mobility to 3-/6 to promote greater ease with self care skills.  4. Increase thoracic joint mobility to 2+/6 to promote greater ease with self care skills  5. Pt. to demonstrate increased MMT for cervical paraspinals to 3+/5 to improve endurance with upright unsupported sitting posture  6. Pt. to demonstrate increased MMT for bilateral UE flexion/abduction to 4+/5 to improve tolerance for ADLs and work activities.   8. Pt. to demonstrate increased MMT for mid-trap/lower trap strength to 3+/5 to improve posture stability during OH reaching/lifting tasks.  9. Pt to be independent with HEP to improve ROM and independence with ADL's  10. Pt. to report a decrease in UE paresthesia by at least 50% OH reach/self care skills/household tasks    Assessment     This is a 73 y.o. female referred to outpatient physical therapy who presents with a medical diagnosis of nerve impingement and PT diagnosis of posture imbalance and cervical radiculopathy demonstrating joint dysfunction and functional limitation as described above. Level of complexity is moderate; based on patient's past medical history including the above co-morbidities and personal factors; functional limitations, and clinical presentation directly impacting his/her plan of care. Pt demonstrates good rehab potential.    Patient's signs and symptoms were consistent with the above diagnoses. Pt. has diffuse nerve pain, which maybe a double crush syndrome. OT evaluated and will treat her bilateral forearm and wrist pain. Patient was in agreement with set goals. Pt will benefit from physical therapy services in order to maximize painfree functional independence.    Plan     Pt will be treated by physical therapy 1-3 times a week for 8 weeks for pt. education, HEP, therapeutic exercises, neuromuscular re-education, joint/soft tissue mobilizations, and modalities, including but not exclusive to dry needling,  prn to achieve established goals. Urmila may at times be seen by a PTA as part of the Rehab Team.     I certify the need for these services furnished under this plan of treatment and while under my care.______________________________ Physician/Referring Practitioner  Date of Signature

## 2019-01-11 NOTE — PATIENT INSTRUCTIONS
Joint Protection Program for Arthritis     Joint Protection (Wringing)    Avoid wringing towels by twisting.  Solution: Loop towel around sink faucet as if braiding and pull gently, or let drip-dry.    Joint Protection (Use Large Joints)    Avoid placing pressure on fingertips.  Solution: Transfer work to other parts of body which are not affected or which have greater strength. Using body weight to push heavy doors open is an example.    Joint Protection (Ulnar Deviation)    Avoid positions that cause fingers to lean sideways toward little finger.  Solution: Use devices like jar-openers to assist in activities.    Joint Protection (Pinch)    Avoid tight pinch, such as when holding a pen.  Solution: Use a thick pen with a felt tip to reduce pressure on fingers.    Joint Protection (Lifting)    Avoid picking up heavy items with one hand.  Solution: Use both hands, and slide item whenever possible.     Joint Protection ()    Avoid: grasping thin utensils for prolonged periods.  Solution: Hold thick-handled tools in dagger fashion when-ever possible for performing tasks such as stirring or scrub-abdirashid. Relax fingers every 10 minutes during activity.    Joint Protection (Carrying)    Avoid carrying items with weight on fingers.  Solution: Use a shoulder bag or a back pack.  Copyright © I. All rights reserved.       Patient supplied median nerve glides for hands AND UPPER extremity.

## 2019-01-11 NOTE — PLAN OF CARE
[facility] Occupational Therapy  Initial Evaluation     Evaluation Date: 1/11/2019  Patient: Urmila Martínez  YOB: 1945  Chart Number: [id number]  Referring Physician: Ana Li and Adán Marquez MD   Medical Diagnosis:   G56.00 (ICD-10-CM) - Carpal tunnel syndrome, unspecified laterality       Therapy Diagnosis:   1. Numbness         Plan of Care Certification Date: 1-11-18 to 2-11-18  Authorization Period: 12-13-18 to 12-13-19   Date of Return to MD:     Visit #: 1 of 12  Time In: 9  Time Out: 10  Total Billable Time: 60    Precautions:  Standard    Subjective     Involved Side: (B) CTS  Dominant Side: Right  Date of Onset: years of chronic pain , but numbness started in Nov.2018   Mechanism of Injury: insidious onset   History of Current Condition: has Seropositive RA, Raynaud's+,Sjogren's syndrome  Surgical Procedure: n/a   Imaging: EMG scheduled for March     Previous Therapy: n/a for this condition; patient will be seeing PT for (L) shoulder pain /impingement/ nerve       Comments: patient has noticed improvements with less numbness in cubital tunnel with leaning on her elbow. Patient reports that she use to having R flair ups with RA, but as of November of 2018 the started with numbness or tingling symptoms . The numbness is now slowly resolving.          Patient's Goals for Therapy:  To increase motion to return to normal ADLs and IADLs , decrease pain    Pain:  Functional Pain Scale Rating 0-10:   2/10 with use  2/10 without use   2/10 with sleep     Location: in shoulders, wrist  Description: Dull  Easing Factors: nothing    Functional Limitations/Social History:    Previous functional status includes: Independent with all ADLs.     Current FunctionalStatus   Home/Living environment : lives with their spouse      Limitation of Functional Status as follows:   ADLs/IADLs:     - Feeding: I    - Bathing: I    - Dressing: I    - Grooming: I    - Driving:  I     Leisure: Gardening    Occupation:  retired  Working presently: home-maker  Duties: home duties     Past Medical History/Physical Systems Review:   Past Medical History:   Diagnosis Date    Arthritis     Asymptomatic PVCs     Benign liver cyst 09/26/2012    Fibrocystic breast 1995    left     Fibrocystic breast 1995    left    Fibrocystic breast 1997    right    Joint pain     Kidney stones 07/20/2012    NS (nuclear sclerosis) 7/19/2013    Osteopenia     Raynaud's disease     Rheumatoid arthritis(714.0)     Sjogren's disease      Urmila Martínez  has a past surgical history that includes Cyst Removal; Colonoscopy (N/A, 10/20/2015); TONSILLECTOMY, ADENOIDECTOMY; Cholecystectomy (1990's ); Cyst Removal (about 2010); and Breast biopsy (Bilateral).    Urmila has a current medication list which includes the following prescription(s): alendronate, ascorbic acid (vitamin c), aspirin child, atorvastatin, calcium citrate-vitamin d3 315-200 mg, cevimeline, diclofenac sodium, fluticasone, hydroxychloroquine, meloxicam, and miscellaneous medical supply.    Review of patient's allergies indicates:   Allergen Reactions    Doxycycline      Other reaction(s): vomit  Other reaction(s): Hives    Sulfa (sulfonamide antibiotics) Nausea And Vomiting          Objective     Mental status: alert    Observation:   Skin dry    Sensation: Median Nerve Distribution   1/11/2019 1/11/2019    Left Right   Monofilament Testing     Normal 1.65-2.83 Diminshed Diminshed   Diminished Light Touch 3.22-3.61 Present Present   Diminished Protective 3.84-4.31 Present Present   Loss of Protective 4.56-6.65 Present Present   Untestable >6.65 Present Present     No open wounds     Comments: left hand worst than the right       Range of Motion:   Right AROM- WNL in (B) hands and wrist and elbow         Special Tests:   Right   1/11/2019   Thumb CMC Grind Test  +   Phalen's Test  -    Tinel's Test at  Wrist    Neg- carpal tunnel, cubital  tunnel, and cubital fossa             Strength: (IGOR Dynamometer in lbs.) Average 3 trials, Position II:     1/11/2019 1/11/2019   Rung2 Right  Left   GIOR # 2 35# 20#       Pinch Strength (Measured in psi)     1/11/2019 1/11/2019    Right Left   Key Pinch 7 psi 7 psi   3pt Pinch 6 psi 4 psi   2pt Pinch 4 psi 3 psi       Outcome Measure: FOTO    Category: Self Care      Current Score  = 48% impaired, CK = at least 40% but < 60% impaired, limited or restricted  Goal at Discharge Score = 30% impaired CJ = at least 20% but < 40% impaired, limited or restricted    Score interpretation is as follows:   TEST SCORE  Modifier  Impairment Limitation Restriction    0%  CH  0 % impaired, limited or restricted    1-19%  CI  @ least 1% but less than 20% impaired, limited or restricted    20-39%  CJ  @ least 20%<40% impaired, limited or restricted    40-59%  CK  @ least 40%<60% impaired, limited or restricted    60-79%  CL  @ least 60% <80% impaired, limited or restricted    80-99%  CM  @ least 80%<100% impaired limited or restricted    100%  CN  100% impaired, limited or restricted             Treatment     Treatment Time In/out  (separate from total time) : 10 minutes at the end of the hour       Issued HEP and educated on modality use for pain management (see patient instructions). Pt verbally understood the instructions as they were given and demonstrated proper form and technique during therapy. Pt was advise to perform these exercises free of pain, and to stop performing them if pain occurs.    Patient purchased a carpal tunnel brace  Yesterday- CHT instructed to wear during sleep and with use.   See PI in note section for detail HEP    Patient received paraffin bath to (B) hand(s) for 8 minutes to increase blood flow, circulation, pain management and for tissue elasticity prior to therex.     Patient/Family Education: role of OT, goals for OT, scheduling/cancellations - pt verbalized understanding. Discussed  insurance limitations with patient.      Assessment       Anticipated barriers to occupational therapy:   Pt has no cultural, educational or language barriers to learning provided.    Profile and History Assessment of Occupational Performance Level of Clinical Decision Making Complexity Score   Occupational Profile:   Urmila Martínez is a 73 y.o. female who lives with their spouse and is currently home-maker. Urmila Martínez has difficulty with  feeding, bathing and grooming  housework/household chores  affecting his/her daily functional abilities. His/her main goal for therapy is reduce pain and numbness .     Comorbidities:   see above    Medical and Therapy History Review:   Brief               Performance Deficits    Physical:  Joint Mobility  Muscle Endurance   Strength  Pinch Strength    Cognitive:  No Deficits    Psychosocial:    No Deficits     Clinical Decision Making:  high    Assessment Process:  Comprehensive Assessments    Modification/Need for Assistance:  Significant Modifications/Assistance    Intervention Selection:  Multiple Treatment Options       low  Based on PMHX, co morbidities , data from assessments and functional level of assistance required with task and clinical presentation directly impacting function.     Assessment  This 73 y.o. female referred to Outpatient Occupational Therapy with diagnosis of   Encounter Diagnosis   Name Primary?    Numbness     presents with limitations as described in problem list. Patient can benefit from Occupational Therapy services for Ultrasound, moist heat, PROM, AAROM, AROM, Theraputic exercises, home exercise program provied with written instructions, ice and Ice massage and Orthosis, if deemed necessary . The following goals were discussed with the patient and she is in agreement with them as to be addressed in the treatment plan.     Problem List:   Decreased function of Right UE and Decreased function of Left UE      Goals:       Goals to be met in  6-8  weeks:    1) Patient to be IND with HEP and modalities for pain managment.  2) Patient will  Increase AROM 15-20 degrees in hand/wrist to increase functional hand use for ADLs/work/leisure activities.  3)Pt will increase  strength 10-20 lbs. to improve functional grasp for ADLs/work/leisure activities.   4) Pt will report wearing CT brace for night and daily task use to reduce symptoms   5) Pt will increase pinch strength in all 3 limited positions by 1-3 psi to assist with manipulation and fine motor task        Plan     Plan  Urmila to be treated by Occupational Therapy 1-2  times per week for 60 days during the certification period from   1-11-19  to 2-11-19  to achieve the established goals.         Samara Cruz, MOT,  OTR/L, CHT  Occupational therapist, Certified Hand Therapist

## 2019-01-11 NOTE — PROGRESS NOTES
[facility] Occupational Therapy  Initial Evaluation     Evaluation Date: 1/11/2019  Patient: Urmila Martínez  YOB: 1945  Chart Number: [id number]  Referring Physician: Ana Li, Chase  And Adán Marquez MD   Medical Diagnosis:   G56.00 (ICD-10-CM) - Carpal tunnel syndrome, unspecified laterality       Therapy Diagnosis:   1. Numbness         Plan of Care Certification Date: 1-11-18 to 2-11-18  Authorization Period: 12-13-18 to 12-13-19   Date of Return to MD:     Visit #: 1 of 12  Time In: 9  Time Out: 10  Total Billable Time: 60    Precautions:  Standard    Subjective     Involved Side: (B) CTS  Dominant Side: Right  Date of Onset: years of chronic pain , but numbness started in Nov.2018   Mechanism of Injury: insidious onset   History of Current Condition: has Seropositive RA, Raynaud's+,Sjogren's syndrome  Surgical Procedure: n/a   Imaging: EMG scheduled for March     Previous Therapy: n/a for this condition; patient will be seeing PT for (L) shoulder pain /impingement/ nerve       Comments: patient has noticed improvements with less numbness in cubital tunnel with leaning on her elbow. Patient reports that she use to having R flair ups with RA, but as of November of 2018 the started with numbness or tingling symptoms . The numbness is now slowly resolving.          Patient's Goals for Therapy:  To increase motion to return to normal ADLs and IADLs , decrease pain    Pain:  Functional Pain Scale Rating 0-10:   2/10 with use  2/10 without use   2/10 with sleep     Location: in shoulders, wrist  Description: Dull  Easing Factors: nothing    Functional Limitations/Social History:    Previous functional status includes: Independent with all ADLs.     Current FunctionalStatus   Home/Living environment : lives with their spouse      Limitation of Functional Status as follows:   ADLs/IADLs:     - Feeding: I    - Bathing: I    - Dressing: I    - Grooming: I    -  Driving: I     Leisure: Gardening    Occupation:  retired  Working presently: home-maker  Duties: home duties     Past Medical History/Physical Systems Review:   Past Medical History:   Diagnosis Date    Arthritis     Asymptomatic PVCs     Benign liver cyst 09/26/2012    Fibrocystic breast 1995    left     Fibrocystic breast 1995    left    Fibrocystic breast 1997    right    Joint pain     Kidney stones 07/20/2012    NS (nuclear sclerosis) 7/19/2013    Osteopenia     Raynaud's disease     Rheumatoid arthritis(714.0)     Sjogren's disease      Urmila Martínez  has a past surgical history that includes Cyst Removal; Colonoscopy (N/A, 10/20/2015); TONSILLECTOMY, ADENOIDECTOMY; Cholecystectomy (1990's ); Cyst Removal (about 2010); and Breast biopsy (Bilateral).    Urmila has a current medication list which includes the following prescription(s): alendronate, ascorbic acid (vitamin c), aspirin child, atorvastatin, calcium citrate-vitamin d3 315-200 mg, cevimeline, diclofenac sodium, fluticasone, hydroxychloroquine, meloxicam, and miscellaneous medical supply.    Review of patient's allergies indicates:   Allergen Reactions    Doxycycline      Other reaction(s): vomit  Other reaction(s): Hives    Sulfa (sulfonamide antibiotics) Nausea And Vomiting          Objective     Mental status: alert    Observation:   Skin dry    Sensation: Median Nerve Distribution   1/11/2019 1/11/2019    Left Right   Monofilament Testing     Normal 1.65-2.83 Diminshed Diminshed   Diminished Light Touch 3.22-3.61 Present Present   Diminished Protective 3.84-4.31 Present Present   Loss of Protective 4.56-6.65 Present Present   Untestable >6.65 Present Present     No open wounds     Comments: left hand worst than the right       Range of Motion:   Right AROM- WNL in (B) hands and wrist and elbow         Special Tests:   Right   1/11/2019   Thumb CMC Grind Test  +   Phalen's Test  -    Tinel's Test at  Wrist    Neg- carpal tunnel,  cubital tunnel, and cubital fossa             Strength: (IGOR Dynamometer in lbs.) Average 3 trials, Position II:     1/11/2019 1/11/2019   Rung2 Right  Left   IGOR # 2 35# 20#       Pinch Strength (Measured in psi)     1/11/2019 1/11/2019    Right Left   Key Pinch 7 psi 7 psi   3pt Pinch 6 psi 4 psi   2pt Pinch 4 psi 3 psi       Outcome Measure: FOTO    Category: Self Care      Current Score  = 48% impaired, CK = at least 40% but < 60% impaired, limited or restricted  Goal at Discharge Score = 30% impaired CJ = at least 20% but < 40% impaired, limited or restricted    Score interpretation is as follows:   TEST SCORE  Modifier  Impairment Limitation Restriction    0%  CH  0 % impaired, limited or restricted    1-19%  CI  @ least 1% but less than 20% impaired, limited or restricted    20-39%  CJ  @ least 20%<40% impaired, limited or restricted    40-59%  CK  @ least 40%<60% impaired, limited or restricted    60-79%  CL  @ least 60% <80% impaired, limited or restricted    80-99%  CM  @ least 80%<100% impaired limited or restricted    100%  CN  100% impaired, limited or restricted             Treatment     Treatment Time In/out  (separate from total time) : 10 minutes at the end of the hour       Issued HEP and educated on modality use for pain management (see patient instructions). Pt verbally understood the instructions as they were given and demonstrated proper form and technique during therapy. Pt was advise to perform these exercises free of pain, and to stop performing them if pain occurs.    Patient purchased a carpal tunnel brace  Yesterday- CHT instructed to wear during sleep and with use.   See PI in note section for detail HEP    Patient received paraffin bath to (B) hand(s) for 8 minutes to increase blood flow, circulation, pain management and for tissue elasticity prior to therex.     Patient/Family Education: role of OT, goals for OT, scheduling/cancellations - pt verbalized understanding. Discussed  insurance limitations with patient.      Assessment       Anticipated barriers to occupational therapy:   Pt has no cultural, educational or language barriers to learning provided.    Profile and History Assessment of Occupational Performance Level of Clinical Decision Making Complexity Score   Occupational Profile:   Urmila Martínez is a 73 y.o. female who lives with their spouse and is currently home-maker. Urmila Martínez has difficulty with  feeding, bathing and grooming  housework/household chores  affecting his/her daily functional abilities. His/her main goal for therapy is reduce pain and numbness .     Comorbidities:   see above    Medical and Therapy History Review:   Brief               Performance Deficits    Physical:  Joint Mobility  Muscle Endurance   Strength  Pinch Strength    Cognitive:  No Deficits    Psychosocial:    No Deficits     Clinical Decision Making:  high    Assessment Process:  Comprehensive Assessments    Modification/Need for Assistance:  Significant Modifications/Assistance    Intervention Selection:  Multiple Treatment Options       low  Based on PMHX, co morbidities , data from assessments and functional level of assistance required with task and clinical presentation directly impacting function.     Assessment  This 73 y.o. female referred to Outpatient Occupational Therapy with diagnosis of   Encounter Diagnosis   Name Primary?    Numbness     presents with limitations as described in problem list. Patient can benefit from Occupational Therapy services for Ultrasound, moist heat, PROM, AAROM, AROM, Theraputic exercises, home exercise program provied with written instructions, ice and Ice massage and Orthosis, if deemed necessary . The following goals were discussed with the patient and she is in agreement with them as to be addressed in the treatment plan.     Problem List:   Decreased function of Right UE and Decreased function of Left UE      Goals:       Goals to be met in  4  weeks:    1) Patient to be IND with HEP and modalities for pain managment.  2) Patient will  Increase AROM 15-20 degrees in hand/wrist to increase functional hand use for ADLs/work/leisure activities.  3)Pt will increase  strength 10-20 lbs. to improve functional grasp for ADLs/work/leisure activities.   4) Pt will report wearing CT brace for night and daily task use to reduce symptoms   5) Pt will increase pinch strength in all 3 limited positions by 1-3 psi to assist with manipulation and fine motor task        Plan     Plan  Urmila to be treated by Occupational Therapy 1-2  times per week for 30 days during the certification period from   1-11-19  to 2-11-19  to achieve the established goals.         Samara Cruz, MOT,  OTR/L, CHT  Occupational therapist, Certified Hand Therapist

## 2019-01-14 ENCOUNTER — PATIENT MESSAGE (OUTPATIENT)
Dept: RHEUMATOLOGY | Facility: CLINIC | Age: 74
End: 2019-01-14

## 2019-01-14 ENCOUNTER — PATIENT MESSAGE (OUTPATIENT)
Dept: INTERNAL MEDICINE | Facility: CLINIC | Age: 74
End: 2019-01-14

## 2019-01-15 ENCOUNTER — PATIENT MESSAGE (OUTPATIENT)
Dept: INTERNAL MEDICINE | Facility: CLINIC | Age: 74
End: 2019-01-15

## 2019-01-15 ENCOUNTER — TELEPHONE (OUTPATIENT)
Dept: RHEUMATOLOGY | Facility: CLINIC | Age: 74
End: 2019-01-15

## 2019-01-15 ENCOUNTER — CLINICAL SUPPORT (OUTPATIENT)
Dept: REHABILITATION | Facility: HOSPITAL | Age: 74
End: 2019-01-15
Payer: MEDICARE

## 2019-01-15 DIAGNOSIS — M54.12 CERVICAL RADICULOPATHY: ICD-10-CM

## 2019-01-15 DIAGNOSIS — R29.3 POSTURE IMBALANCE: ICD-10-CM

## 2019-01-15 DIAGNOSIS — R20.0 NUMBNESS: ICD-10-CM

## 2019-01-15 PROCEDURE — 97110 THERAPEUTIC EXERCISES: CPT | Mod: HCNC,PO

## 2019-01-15 PROCEDURE — 97140 MANUAL THERAPY 1/> REGIONS: CPT | Mod: HCNC,PO

## 2019-01-15 PROCEDURE — 97018 PARAFFIN BATH THERAPY: CPT | Mod: HCNC,PO,59

## 2019-01-15 NOTE — TELEPHONE ENCOUNTER
Called and spoke with patient about pain.  She continues to have L shoulder pain with radiation to her elbow and hand.   Pain was relieved with the prednisone but returned after the completion of the medication.  She was taking meloxicam but it provided no alleviation of the symptoms.     The last few days, she noticed worsening of her symptoms due to the cold weather.  She started to take Tylenol Arthritis (1x/day), uncertain if it alleviates the symptoms.    EMG scheduled for March.      Shoulder Xray shows mild DJD of the AC joint.  No fractures.    Patient continues to wear bilateral hand brace in the evening and participates in PT/OT.      Patient will update me if symptoms continues to worsen.  At this time, she is okay with usage of Tylenol Arthritis.  Again, informed patient to not use NSAIDs due to renal dysfunction.  Patient express understanding.

## 2019-01-15 NOTE — PATIENT INSTRUCTIONS
MEDIAN NERVE: Flossing I    With right elbow bent and palm facing up as if holding a tray, head tilted away. Simultaneously straighten arm and tilt head toward involved shoulder.    MEDIAN NERVE: Flossing II    Stand with right arm at side, palm down, hand and fingers pulled up, arm turned out. Simultaneously depress shoulder and tilt head toward involved shoulder. Keep trunk straight.    MEDIAN NERVE: Mobilization I    Stand with left elbow resting at side, palm up. Use opposite hand to pull hand back, fingers relaxed.    MEDIAN NERVE: Mobilization II    Stand with left arm in front of body, palm up. Use opposite hand to pull hand back, fingers relaxed.    MEDIAN NERVE: Mobilization III    Stand with left elbow resting at side, palm up. Use opposite hand to pull hand and fingers back.     MEDIAN NERVE: Mobilization IV    Stand with left arm in front of body, palm up. Use opposite hand to pull hand and fingers back.

## 2019-01-15 NOTE — PROGRESS NOTES
"  Physical Therapy Daily Treatment Note     Name: Urmila DAN [facility] Number: [id number]    Therapy Diagnosis:   Encounter Diagnoses   Name Primary?    Cervical radiculopathy     Posture imbalance      Physician: Ana Li, *    Visit Date: 1/15/2019    Physician Orders: PT Eval and Treat  Medical Diagnosis: Nerve Impingement  Evaluation Date: 1/11/2019  Authorization Period Expiration: 12/13/19  Plan of Care Certification Period: 1/11/19 to 3/10/19  Visit #/Visits authorized: 1/20    Time In: 3:00 pm  Time Out: 4:03 pm  Total Billable Time: 55 minutes (TE-2, MT-2)    Precautions: Standard, RA and Sjorgrens disease    Subjective     Pt reports: she had a lot of trouble sleeping last night and reported pain in B arms this morning which she stated is usually only her L that bothers her. Pt reports her pain is better now although she does have a throbbing pain still in her L upper arm and shoulder.  Pt stated she has been completing her HEP twice daily and feels like it has been helping.   She was compliant with home exercise program.  Response to previous treatment: no adverse effects  Functional change: none    Pain: 5/10  Location: left shoulder    Objective     Pt received moist hot pack to the L shoulder for 8' pre treatment for increased bloodflow and decrease muscle tension.    Urmila received the following manual therapy techniques: Soft tissue Mobilization were applied to the: L upper trap, pec minor and major (clavicular head ) , proximal triceps and biceps brachii for 25 minutes    Urmila received therapeutic exercises to develop strength, ROM and posture for 30 minutes including:    Supine:  Pec stretch on towel roll: 4'  Chin tucks: 5" x 20  Cervical rotations: 5" x10 B    Seated:  Scapular retractions: 5" 2x10    Standing:  Doorway chest stretch: 4x20"     Home Exercises Provided and Patient Education Provided     Education provided:   - Continue previous HEP and addition of new HEP provided " today  - Postural awareness    Written Home Exercises Provided: yes.  Exercises were reviewed and Urmila was able to demonstrate them prior to the end of the session.  Urmila demonstrated good  understanding of the education provided.   See EMR under Patient Instructions for exercises provided 1/15/2019.    Assessment     Pt experiened relief following manual interventions, reporting decreased throbbing pain in her L proximal arm.  Pt demonstrating slouched posture and IR shoulders B, which improved with cueing and education on postural awareness. Pt able to complete session today with no increased pain or symptom exacerbation .  Urmila is progressing well towards her goals.   Pt prognosis is Good.     Pt will continue to benefit from skilled outpatient physical therapy to address the deficits listed in the problem list box on initial evaluation, provide pt/family education and to maximize pt's level of independence in the home and community environment.   Pt's spiritual, cultural and educational needs considered and pt agreeable to plan of care and goals.    Anticipated barriers to physical therapy: comorbidities    Goals:   Short Term Goals: 4 weeks  1. Pt. to be independent with symptom management  2. Pt. to demonstrate increased MMT for cervical paraspinals to 3/5 to improve tolerance to upright unsupported sitting posture.  3. Pt to tolerate HEP to improve ROM and independence with ADLs  4. Pt. to report a decrease in UE paresthesia by 25% with OH reach activities     Long Term Goals: 8 weeks  1. Pt. to report decreased left shoulder pain </ =  2/10 at worst to increase tolerance for upright unsupported sitting posture  2. Pt. to demonstrate proper cervical and scapula retraction requiring no verbal cues from PT.  3. Increase bilateral lower cervical joint mobility to 3-/6 to promote greater ease with self care skills.  4. Increase thoracic joint mobility to 2+/6 to promote greater ease with self care  skills  5. Pt. to demonstrate increased MMT for cervical paraspinals to 3+/5 to improve endurance with upright unsupported sitting posture  6. Pt. to demonstrate increased MMT for bilateral UE flexion/abduction to 4+/5 to improve tolerance for ADLs and work activities.   8. Pt. to demonstrate increased MMT for mid-trap/lower trap strength to 3+/5 to improve posture stability during OH reaching/lifting tasks.  9. Pt to be independent with HEP to improve ROM and independence with ADL's  10. Pt. to report a decrease in UE paresthesia by at least 50% OH reach/self care skills/household tasks    Plan     Continue POC, monitoring radicular symptoms. Progress strengthening exercises as tolerated.     Irene Self, PTA

## 2019-01-15 NOTE — PROGRESS NOTES
Occupational Therapy Daily Treatment Note     Name: Urmila DAN [place]  Clinic Number: [id number]  Physician: Ana Li, * and Adán Marquez MD   Medical Diagnosis:   G56.00 (ICD-10-CM) - Carpal tunnel syndrome, unspecified laterality      Therapy Diagnosis:   Encounter Diagnosis   Name Primary?    Numbness      Visit Date: 1/15/2019  Physician Orders: Eval and Treat   Surgical Procedure and Date: NA  Evaluation Date: 1/11/19   Insurance Authorization Period Expiration: 12/13/19   Plan of Care Certification Period: 2/11/19   Date of Return to MD: 2/7/19     Visit # / Visits authorized: 2 / 12  Time In:1345  Time Out: 1430  Total Billable Time: 45 minutes    Precautions:  Standard, Sjorgren's disease, RA     Subjective     Pt reports: pain throughout her LUE at arrival.  She reports mild improvement in symptoms overall with performance of HEP.    she was compliant with home exercise program given last session. - 2x/ day   Response to previous treatment:initiated HEP   Functional change: NA     Pain: 5/10 at arrival, 2/10 post session   Location: left UE     Objective   Patient seen by OT this date.  Patient received individual therapy with activities as follows:     Urmila received the following supervised modalities after being cleared for contradictions for 8 minutes:   - Patient received paraffin bath to B hand(s) for 8 minutes to increase blood flow, circulation, pain management and for tissue elasticity prior to therex.       Urmila participated in therapeutic exercises to improve ROM, strength, and decrease pain for 37 minutes:   -median nerve flossing I and II   -median nerve mobilization I-IV   - patient performed the following exercises for 2 min each during FOT:    - wrist flex/ext in neutral holding ball    - wrist RD/UD in neutral holding ball    - radial abd    - vazquez abd    - composite abd/add    - thumb flex/ext   - thumb opposition    - thumb circumduction   - Reviewed HEP and  modality use for pain management with patient reporting good understanding of same.       Home Exercises and Education Provided     Education provided: HEP    - Progress towards goals   - POC     Written Home Exercises Provided: yes.  Exercises were reviewed and Urmila was able to demonstrate them prior to the end of the session.  Urmila demonstrated good  understanding of the HEP provided.     See EMR under Patient Instructions for exercises provided 1/11/19 and 1/15/19.     Pt has no cultural, educational or language barriers to learning provided.    Assessment   Patient tolerated treatment session well.  She required verbal and physical cueing for median nerve flossing and mobilizations, and reported min increase in pain/symptoms at the L cervical and biceps region with performance of median nerve mobilizations.  She was able to perform the above exercises in fluidotherapy within a pain free range without any problems or increase in pain/symtpoms.   Pt reported decreased pain/symptoms throughout the LUE post intervention.      Pt would continue to benefit from skilled OT. Urmila is progressing well towards her goals and there are no updates to goals at this time. Pt prognosis is Good. Pt will continue to benefit from skilled outpatient occupational therapy to address the deficits listed in the problem list on initial evaluation provide pt/family education and to maximize pt's level of independence in the home and community environment.     Anticipated barriers to occupational therapy: none indicated at this time.     Pt's spiritual, cultural and educational needs considered and pt agreeable to plan of care and goals.    Goals:  Goals to be met in 4-5 weeks:     1) Patient to be IND with HEP and modalities for pain managment.  2) Patient will  Increase AROM 15-20 degrees in hand/wrist to increase functional hand use for ADLs/work/leisure activities.  3)Pt will increase  strength 10-20 lbs. to improve  functional grasp for ADLs/work/leisure activities.   4) Pt will report wearing CT brace for night and daily task use to reduce symptoms   5) Pt will increase pinch strength in all 3 limited positions by 1-3 psi to assist with manipulation and fine motor task    Plan   Continue POC in pursuit of OT goals.   Discussed Plan of Care with patient: Yes  Updates/Grading for next session: advance as tolerated

## 2019-01-22 ENCOUNTER — CLINICAL SUPPORT (OUTPATIENT)
Dept: REHABILITATION | Facility: HOSPITAL | Age: 74
End: 2019-01-22
Payer: MEDICARE

## 2019-01-22 DIAGNOSIS — R29.3 POSTURE IMBALANCE: ICD-10-CM

## 2019-01-22 DIAGNOSIS — R20.0 NUMBNESS: ICD-10-CM

## 2019-01-22 DIAGNOSIS — M54.12 CERVICAL RADICULOPATHY: ICD-10-CM

## 2019-01-22 PROCEDURE — 97110 THERAPEUTIC EXERCISES: CPT | Mod: HCNC,PO

## 2019-01-22 PROCEDURE — 97018 PARAFFIN BATH THERAPY: CPT | Mod: HCNC,PO

## 2019-01-22 NOTE — PROGRESS NOTES
Occupational Therapy Daily Treatment Note     Name: Urmila DAN [place]  Clinic Number: [id number]  Physician: Ana Li, * and Adán Marquez MD   Medical Diagnosis:   G56.00 (ICD-10-CM) - Carpal tunnel syndrome, unspecified laterality      Therapy Diagnosis:   Encounter Diagnosis   Name Primary?    Numbness      Visit Date: 1/22/2019  Physician Orders: Eval and Treat   Surgical Procedure and Date: NA  Evaluation Date: 1/11/19   Insurance Authorization Period Expiration: 12/13/19   Plan of Care Certification Period: 2/11/19   Date of Return to MD: 2/7/19     Visit # / Visits authorized: 3 / 12  Time In:1345  Time Out: 1430  Total Billable Time: 45 minutes    Precautions:  Standard, Sjorgren's disease, RA     Subjective     Pt reports: pain has significantly improved since last treatment session.  She reports she experienced min numbness/tingling in her L hand fingertips last night, but symptoms have since resolved.    She was compliant with home exercise program given last session. - 3x/ day   Response to previous treatment: positive, progressing   Functional change:decreased pain     Pain: 2/10 at arrival, 2/10 post session   Location: left UE     Objective   Patient seen by OT this date.  Patient received individual therapy with activities as follows:     Urmila received the following supervised modalities after being cleared for contradictions for 8 minutes:   - Patient received paraffin bath to B hand(s) for 8 minutes to increase blood flow, circulation, pain management and for tissue elasticity prior to therex.       Urmila participated in therapeutic exercises to improve ROM, strength, and decrease pain for 37 minutes:   - median nerve glides on wall x 5 min    - patient performed the following exercises for 2 min each during FOT:    - wrist flex/ext in neutral with 1# wt    - wrist RD/UD in neutral with 1# wt    - PAd/DAb    - radial abd    - vazquez abd    - thumb opposition   - tendon  rizwana (1 blue RB) x 15 reps   - yellow CP 3 pt pinch x 15 reps       Home Exercises and Education Provided     Education provided: HEP    - Progress towards goals   - POC     Written Home Exercises Provided: yes.  Exercises were reviewed and Urmila was able to demonstrate them prior to the end of the session.  Urmila demonstrated good  understanding of the HEP provided.     See EMR under Patient Instructions for exercises provided 1/11/19, 1/15/19, and 1/22/19.     Pt has no cultural, educational or language barriers to learning provided.    Assessment   Patient tolerated treatment session well.  Median nerve glides continue to provoke symptoms, but patient reported  symptoms/pain is less this date than previous treatment session.  She was upgraded to 1# wrist strengthening and was able to perform exercises within a pain free range of motion.  No increase in pain/symtoms with the above AROM exercises.  Initiated light resistance hand and pinch strengthening this date with pt reporting min increased pain that  resolved with cessation of activity.      Pt would continue to benefit from skilled OT. Urmila is progressing well towards her goals and there are no updates to goals at this time. Pt prognosis is Good. Pt will continue to benefit from skilled outpatient occupational therapy to address the deficits listed in the problem list on initial evaluation provide pt/family education and to maximize pt's level of independence in the home and community environment.     Anticipated barriers to occupational therapy: none indicated at this time.     Pt's spiritual, cultural and educational needs considered and pt agreeable to plan of care and goals.    Goals:  Goals to be met in 4-5 weeks:     1) Patient to be IND with HEP and modalities for pain managment. - ongoing   2) Patient will  Increase AROM 15-20 degrees in hand/wrist to increase functional hand use for ADLs/work/leisure activities. - in progress   3)Pt will  increase  strength 10-20 lbs. to improve functional grasp for ADLs/work/leisure activities. - in progress   4) Pt will report wearing CT brace for night and daily task use to reduce symptoms - ongoing   5) Pt will increase pinch strength in all 3 limited positions by 1-3 psi to assist with manipulation and fine motor task - in progress     Plan   Continue POC in pursuit of OT goals.   Discussed Plan of Care with patient: Yes  Updates/Grading for next session: advance as tolerated

## 2019-01-22 NOTE — PROGRESS NOTES
"  Physical Therapy Daily Treatment Note     Name: Urmila DAN [facility] Number: [id number]    Therapy Diagnosis:   Encounter Diagnoses   Name Primary?    Cervical radiculopathy     Posture imbalance      Physician: Ana Li, *    Visit Date: 1/22/2019    Physician Orders: PT Eval and Treat  Medical Diagnosis: Nerve Impingement  Evaluation Date: 1/11/2019  Authorization Period Expiration: 12/13/19  Plan of Care Certification Period: 1/11/19 to 3/10/19  Visit #/Visits authorized: 1/20    Time In: 3:00 pm  Time Out: 4:00 pm  Total Billable Time: 55 minutes (TE-2, MT-2)    Precautions: Standard, RA and Sjorgrens disease    Subjective     Pt reports:   She was compliant with home exercise program.  Response to previous treatment: no adverse effects  Functional change: none    Pain: 5/10  Location: left shoulder    Objective     Pt received moist hot pack to the L shoulder for 8' pre treatment for increased bloodflow and decrease muscle tension.    Urmila received the following manual therapy techniques: Soft tissue Mobilization were applied to the: L upper trap, pec minor and major (clavicular head ) , proximal triceps and biceps brachii for 25 minutes    Urmila received therapeutic exercises to develop strength, ROM and posture for 30 minutes including:    Supine:  Pec stretch on towel roll: 4'  Chin tucks: 5" x 20  Cervical rotations: 5" x10 B    Seated:  Scapular retractions: 5" 2x10    Standing:  Doorway chest stretch: 4x20"     Home Exercises Provided and Patient Education Provided     Education provided:   - Continue previous HEP and addition of new HEP provided today  - Postural awareness    Written Home Exercises Provided: yes.  Exercises were reviewed and Urmila was able to demonstrate them prior to the end of the session.  Urmila demonstrated good  understanding of the education provided.   See EMR under Patient Instructions for exercises provided 1/15/2019.    Assessment     Pt experiened relief " following manual interventions, reporting decreased throbbing pain in her L proximal arm.  Pt demonstrating slouched posture and IR shoulders B, which improved with cueing and education on postural awareness. Pt able to complete session today with no increased pain or symptom exacerbation .  Urmila is progressing well towards her goals.   Pt prognosis is Good.     Pt will continue to benefit from skilled outpatient physical therapy to address the deficits listed in the problem list box on initial evaluation, provide pt/family education and to maximize pt's level of independence in the home and community environment.   Pt's spiritual, cultural and educational needs considered and pt agreeable to plan of care and goals.    Anticipated barriers to physical therapy: comorbidities    Goals:   Short Term Goals: 4 weeks  1. Pt. to be independent with symptom management  2. Pt. to demonstrate increased MMT for cervical paraspinals to 3/5 to improve tolerance to upright unsupported sitting posture.  3. Pt to tolerate HEP to improve ROM and independence with ADLs  4. Pt. to report a decrease in UE paresthesia by 25% with OH reach activities     Long Term Goals: 8 weeks  1. Pt. to report decreased left shoulder pain </ =  2/10 at worst to increase tolerance for upright unsupported sitting posture  2. Pt. to demonstrate proper cervical and scapula retraction requiring no verbal cues from PT.  3. Increase bilateral lower cervical joint mobility to 3-/6 to promote greater ease with self care skills.  4. Increase thoracic joint mobility to 2+/6 to promote greater ease with self care skills  5. Pt. to demonstrate increased MMT for cervical paraspinals to 3+/5 to improve endurance with upright unsupported sitting posture  6. Pt. to demonstrate increased MMT for bilateral UE flexion/abduction to 4+/5 to improve tolerance for ADLs and work activities.   8. Pt. to demonstrate increased MMT for mid-trap/lower trap strength to 3+/5 to  improve posture stability during OH reaching/lifting tasks.  9. Pt to be independent with HEP to improve ROM and independence with ADL's  10. Pt. to report a decrease in UE paresthesia by at least 50% OH reach/self care skills/household tasks    Plan     Continue POC, monitoring radicular symptoms. Progress strengthening exercises as tolerated.     Yolanda Millard, PTA

## 2019-01-29 ENCOUNTER — CLINICAL SUPPORT (OUTPATIENT)
Dept: REHABILITATION | Facility: HOSPITAL | Age: 74
End: 2019-01-29
Payer: MEDICARE

## 2019-01-29 DIAGNOSIS — R20.0 NUMBNESS: ICD-10-CM

## 2019-01-29 DIAGNOSIS — R29.3 POSTURE IMBALANCE: ICD-10-CM

## 2019-01-29 DIAGNOSIS — M54.12 CERVICAL RADICULOPATHY: ICD-10-CM

## 2019-01-29 PROCEDURE — 97110 THERAPEUTIC EXERCISES: CPT | Mod: HCNC,PO

## 2019-01-29 PROCEDURE — 97018 PARAFFIN BATH THERAPY: CPT | Mod: HCNC,PO

## 2019-01-29 NOTE — PATIENT INSTRUCTIONS
MEDIAN NERVE: Flossing I        With right elbow bent and palm facing up as if holding a tray, head tilted away. Simultaneously straighten arm and tilt head toward involved shoulder.  Do ___ sets of ___ repetitions per session. Do ___ sessions per day.    Copyright © [employer]. All rights reserved.

## 2019-01-29 NOTE — PROGRESS NOTES
Occupational Therapy Daily Treatment Note     Name: Urmila DAN [facility] Number: [id number]    Therapy Diagnosis:   Encounter Diagnosis   Name Primary?    Numbness      Physician: Ana Li, *      Visit # / Visits authorized:  2 of 15  Date of Return to MD: 2-7-19       Time In2  Time Out: 3    Total Billable Time: 60 minutes    Precautions: Standard    Subjective     Pt reports: she was compliant with home exercise program given last session.   Response to previous treatment: has been doing HEP twice a day      Pain: 2/10  Location: hands and wrist     Objective     Urmila received the following supervised modalities after being cleared for contradictions for 8 minutes:   -  Patient received paraffin bath to (B)  hand(s) for 8 minutes to increase blood flow, circulation, pain management and for tissue elasticity prior to therex.       Urmila participated in dynamic functional therapeutic exercises to improve functional performance for  10  minutes, including:  - palm digi-extend with blue band x 2 minutes   - palm  up gripping yellow putty x 2 minutes , stopped after 1 minutes due to numbness  - sh flex, ABD, x 3 minutes for nerve glides and stretching   - (B)  TGE  And wrist exten  with fingers extended in fluido  -UBE X 7 minutes forward and back          Home Exercises and Education Provided       Written Home Exercises Provided:  Has been compliant with HEP, twice daily.Unable to progress to strengthening at this time due to numbness after 1 minutes   Exercises were reviewed and Urmila was able to demonstrate them prior to the end of the session.  Urmila demonstrated good  understanding of the education provided.     Pt received a written copy of exercises to perform at home.   See EMR under patient instructions for exercises given.     Urmila demonstrated good  understanding of the education provided.       Assessment     Pt would continue to benefit from skilled OT. Pt  has not had numbness since HEP. Pt has been doing HEP twice daily, has seen a difference. Has been wearing the suggested carpal tunnel braces at night. Has not needed them during the day.  Urmila is progressing well towards her goals and there are no updates to goals at this time. Pt prognosis is Good.    Pt continues to be limited in functional and leisurely pursuits. Pain limits patients participation in ADL's. Pt is not able to carryout necessary vocational tasks. Patient continues to requires cues and skilled supervision to complete HEP     Pt will continue to benefit from skilled outpatient occupational therapy to address the deficits listed in the problem list on initial evaluation provide pt/family education and to maximize pt's level of independence in the home and community environment.     Anticipated barriers to occupational therapy: n/a     Pt's spiritual, cultural and educational needs considered and pt agreeable to plan of care and goals.    Goals:  Cont goals from POC    Plan   Continue 1-2x week x 6-8  weeks during the certification period from 1-11-19   to 2-11-19  in pursuit of OT goals.    Discussed Plan of Care with patient: Yes  Updates/Grading for next session: cont with AROM until she can tolerated strengthening, has numbness with gripping       Samara Cruz, OT , CHT

## 2019-01-29 NOTE — PROGRESS NOTES
"  Physical Therapy Daily Treatment Note     Name: Urmila DAN [facility] Number: [id number]    Therapy Diagnosis:   Encounter Diagnoses   Name Primary?    Cervical radiculopathy     Posture imbalance      Physician: Ana Li, *    Visit Date: 1/29/2019    Physician Orders: PT Eval and Treat  Medical Diagnosis: Nerve Impingement  Evaluation Date: 1/11/2019  Authorization Period Expiration: 12/13/19  Plan of Care Certification Period: 1/11/19 to 3/10/19  Visit #/Visits authorized: 1/20    Time In: 3:00 pm  Time Out: 4:00 pm  Total Billable Time: 55 minutes (TE-2, MT-2)    Precautions: Standard, RA and Sjorgrens disease    Subjective     Pt reports:   She was compliant with home exercise program.  Response to previous treatment: no adverse effects  Functional change: none    Pain: 5/10  Location: left shoulder    Objective     Pt received moist hot pack to the L shoulder for 8' pre treatment for increased bloodflow and decrease muscle tension.    Urmila received the following manual therapy techniques: Soft tissue Mobilization were applied to the: L upper trap, pec minor and major (clavicular head ) , proximal triceps and biceps brachii for 25 minutes    Urmila received therapeutic exercises to develop strength, ROM and posture for 30 minutes including:    Supine:  Pec stretch on towel roll: 4'  Chin tucks: 5" x 20  Cervical rotations: 5" x10 B    Seated:  Scapular retractions: 5" 2x10    Standing:  Doorway chest stretch: 4x20"     Home Exercises Provided and Patient Education Provided     Education provided:   - Continue previous HEP and addition of new HEP provided today  - Postural awareness    Written Home Exercises Provided: yes.  Exercises were reviewed and Urmila was able to demonstrate them prior to the end of the session.  Urmila demonstrated good  understanding of the education provided.   See EMR under Patient Instructions for exercises provided 1/15/2019.    Assessment     Pt experiened relief " following manual interventions, reporting decreased throbbing pain in her L proximal arm.  Pt demonstrating slouched posture and IR shoulders B, which improved with cueing and education on postural awareness. Pt able to complete session today with no increased pain or symptom exacerbation .  Urmila is progressing well towards her goals.   Pt prognosis is Good.     Pt will continue to benefit from skilled outpatient physical therapy to address the deficits listed in the problem list box on initial evaluation, provide pt/family education and to maximize pt's level of independence in the home and community environment.   Pt's spiritual, cultural and educational needs considered and pt agreeable to plan of care and goals.    Anticipated barriers to physical therapy: comorbidities    Goals:   Short Term Goals: 4 weeks  1. Pt. to be independent with symptom management  2. Pt. to demonstrate increased MMT for cervical paraspinals to 3/5 to improve tolerance to upright unsupported sitting posture.  3. Pt to tolerate HEP to improve ROM and independence with ADLs  4. Pt. to report a decrease in UE paresthesia by 25% with OH reach activities     Long Term Goals: 8 weeks  1. Pt. to report decreased left shoulder pain </ =  2/10 at worst to increase tolerance for upright unsupported sitting posture  2. Pt. to demonstrate proper cervical and scapula retraction requiring no verbal cues from PT.  3. Increase bilateral lower cervical joint mobility to 3-/6 to promote greater ease with self care skills.  4. Increase thoracic joint mobility to 2+/6 to promote greater ease with self care skills  5. Pt. to demonstrate increased MMT for cervical paraspinals to 3+/5 to improve endurance with upright unsupported sitting posture  6. Pt. to demonstrate increased MMT for bilateral UE flexion/abduction to 4+/5 to improve tolerance for ADLs and work activities.   8. Pt. to demonstrate increased MMT for mid-trap/lower trap strength to 3+/5 to  improve posture stability during OH reaching/lifting tasks.  9. Pt to be independent with HEP to improve ROM and independence with ADL's  10. Pt. to report a decrease in UE paresthesia by at least 50% OH reach/self care skills/household tasks    Plan     Continue POC, monitoring radicular symptoms. Progress strengthening exercises as tolerated.     Yolanda Millard, PTA

## 2019-02-01 ENCOUNTER — PES CALL (OUTPATIENT)
Dept: ADMINISTRATIVE | Facility: CLINIC | Age: 74
End: 2019-02-01

## 2019-02-01 NOTE — PROGRESS NOTES
"                                                     RHEUMATOLOGY CLINIC FOLLOW UP VISIT  Chief complaints:-Bilateral UE weakness      HPI:-  Urmila House a 74 y.o.  pleasant female with a history of primary Sjogren's syndrome, inflammatory arthritis   (seropositive RA) and Raynaud's phenomena present to the clinic today for L hand numbness that started since mid Nov.      RA controlled and is currently on plaquenil for many years.  Last exam was June 2018.  Noticed a flare of multiple joints a few weeks ago.  Usually resolves in 3 days with NSAIDS.  She did that and all other joints arthralgia improved except for LUE (elbow, wrist, and hand).  Patient continues to feel joint pain and neuropathy.  She feels like numb sensation of the finger tips and it varies which fingers.  Associated symptoms includes weakness - have not drop anything yet.  Symptoms worsen when she wakes up and has to be "massaged" out.  Previously worked in IT - typed a lot.  Went to see PCP - tried a hard/metal splint which worsen the symptoms.  She used it for just 10 days.  Stopped fosmax because of the pain.  Currently complaining of L shoulder discomfort and limited ROM secondary to pain.  Sleeps on her side.  Denies any trauma/falls.       Sjogren controlled.  Does not require eye drops or mouth lubricant.  Using cevimeline daily     Raynaud's controlled with gloves when it's under 60 degrees.      Osteoporosis on fosmax    Interval History    Patient completed PT/OT for LUE and bilateral carpal tunnel.  Symptoms improved but still there.  Wearing bilateral hand brace in the PM - neuropathy symptoms resolve.  Patient still having LUE weakness and pain that is not alleviated with Meloxicam - mild alleviation with Tylenol Arthritis and ASA.  Taking 2 Tylenol Arthritis BID.     EMG scheduled for Feb 19.     Review of Systems   Constitutional: Negative for chills, diaphoresis, fever, malaise/fatigue and weight loss.   HENT: Negative for " congestion, ear discharge, ear pain, hearing loss, nosebleeds, sinus pain and tinnitus.    Eyes: Negative for photophobia, pain, discharge and redness.   Respiratory: Negative for cough, hemoptysis, sputum production, shortness of breath, wheezing and stridor.    Cardiovascular: Negative for chest pain, palpitations, orthopnea, claudication, leg swelling and PND.   Gastrointestinal: Negative for abdominal pain, constipation, diarrhea, heartburn, nausea and vomiting.   Genitourinary: Negative for dysuria, frequency, hematuria and urgency.   Musculoskeletal: Positive for joint pain and myalgias. Negative for back pain and neck pain.   Skin: Negative for rash.   Neurological: Negative for dizziness, tingling, tremors, weakness and headaches.   Endo/Heme/Allergies: Does not bruise/bleed easily.   Psychiatric/Behavioral: Negative for depression, hallucinations and suicidal ideas. The patient is not nervous/anxious and does not have insomnia.        Past Medical History:   Diagnosis Date    Arthritis     Asymptomatic PVCs     Benign liver cyst 09/26/2012    Fibrocystic breast 1995    left     Fibrocystic breast 1995    left    Fibrocystic breast 1997    right    Joint pain     Kidney stones 07/20/2012    NS (nuclear sclerosis) 7/19/2013    Osteopenia     Raynaud's disease     Rheumatoid arthritis(714.0)     Sjogren's disease        Past Surgical History:   Procedure Laterality Date    BREAST BIOPSY Bilateral     2 times - core needle right breast, biopsy left breast: fibrocystic findings    CHOLECYSTECTOMY  1990's     COLONOSCOPY N/A 10/20/2015    Performed by SHARRON Mcdonnell [facility] at [facility] ([facility])    CYST REMOVAL      right ankle - benign cyst removed at 7yrs old     CYST REMOVAL  about 2010    cyst removed from forehead     TONSILLECTOMY, ADENOIDECTOMY      during childhood         Social History     Tobacco Use    Smoking status: Never Smoker    Smokeless tobacco: Never Used   Substance Use  "Topics    Alcohol use: Yes     Comment: limited- glass a wine a few times a year    Drug use: No       Family History   Problem Relation Age of Onset    Liver cancer Mother         liver and colon    Cancer Mother         colon, liver    Heart disease Mother         CABG    Colon cancer Father     Lung cancer Father         thinks colon was first    Cancer Father         colon, lung    Heart disease Brother         multiple bypass    Diabetes Brother     Allergies Daughter     Asthma Daughter     Asthma Grandchild     Cancer Maternal Aunt         stomach    Cancer Maternal Grandmother         breast    Breast cancer Maternal Grandmother     Heart disease Maternal Grandfather     COPD Paternal Grandfather     Cancer Maternal Aunt         thyroid    Fabry's disease Cousin     Amblyopia Neg Hx     Blindness Neg Hx     Cataracts Neg Hx     Glaucoma Neg Hx     Hypertension Neg Hx     Macular degeneration Neg Hx     Retinal detachment Neg Hx     Strabismus Neg Hx     Stroke Neg Hx     Thyroid disease Neg Hx     Melanoma Neg Hx        Review of patient's allergies indicates:   Allergen Reactions    Doxycycline      Other reaction(s): vomit  Other reaction(s): Hives    Sulfa (sulfonamide antibiotics) Nausea And Vomiting           Physical examination:-    Vitals:    02/07/19 0957   BP: 119/68   Pulse: 68   Weight: 85.1 kg (187 lb 9.8 oz)   Height: 5' 5" (1.651 m)   PainSc:   6   PainLoc: Knee       Physical Exam   Constitutional: She is oriented to person, place, and time and well-developed, well-nourished, and in no distress.   HENT:   Head: Normocephalic and atraumatic.   Eyes: Conjunctivae are normal. Pupils are equal, round, and reactive to light.   Neck: Normal range of motion. Neck supple.   Cardiovascular: Normal rate, regular rhythm and normal heart sounds.   Pulmonary/Chest: Effort normal and breath sounds normal.   Abdominal: Soft. Bowel sounds are normal.   Musculoskeletal: "   +tinel and phalen's sign of the L wrist.  L shoulder - limited ROM due to pain.  + lift off test.  Decreased ext/int rotation of the L shoulder (improvement since last visit)  Strength and ROM decreased with LUE due to pain    Neurological: She is alert and oriented to person, place, and time. Gait normal.   Skin: Skin is warm and dry.   Psychiatric: Mood, memory, affect and judgment normal.       Labs:-  Results for ONEL ORONA (MRN [id number]) as of 2/7/2019 09:30   Ref. Range 11/30/2018 14:55 12/13/2018 12:51   WBC Latest Ref Range: 3.90 - 12.70 K/uL 6.27    RBC Latest Ref Range: 4.00 - 5.40 M/uL 4.15    Hemoglobin Latest Ref Range: 12.0 - 16.0 g/dL 12.7    Hematocrit Latest Ref Range: 37.0 - 48.5 % 38.7    MCV Latest Ref Range: 82 - 98 fL 93    MCH Latest Ref Range: 27.0 - 31.0 pg 30.6    MCHC Latest Ref Range: 32.0 - 36.0 g/dL 32.8    RDW Latest Ref Range: 11.5 - 14.5 % 11.9    Platelets Latest Ref Range: 150 - 350 K/uL 215    MPV Latest Ref Range: 9.2 - 12.9 fL 10.4    Sed Rate Latest Ref Range: 0 - 36 mm/Hr 16    Sodium Latest Ref Range: 136 - 145 mmol/L 144    Potassium Latest Ref Range: 3.5 - 5.1 mmol/L 4.0    Chloride Latest Ref Range: 95 - 110 mmol/L 109    CO2 Latest Ref Range: 23 - 29 mmol/L 29    Anion Gap Latest Ref Range: 8 - 16 mmol/L 6 (L)    BUN, Bld Latest Ref Range: 8 - 23 mg/dL 16    Creatinine Latest Ref Range: 0.5 - 1.4 mg/dL 0.9    eGFR if non African American Latest Ref Range: >60 mL/min/1.73 m^2 >60    eGFR if  Latest Ref Range: >60 mL/min/1.73 m^2 >60    Glucose Latest Ref Range: 70 - 110 mg/dL 96    Calcium Latest Ref Range: 8.7 - 10.5 mg/dL 9.4    CRP Latest Ref Range: 0.0 - 8.2 mg/L 5.6      Radiographs:-  Independent visualization of images done.   Xray fingers - unchanged osseous density at the base of the 1st distal phalanx  Xray hand - obliquely oriented linear lucency along the proximal phalanx of the thumb  Shoulder xray - mild DJD of the AC joint. No fx.      Medication List with Changes/Refills   New Medications    BACLOFEN (LIORESAL) 10 MG TABLET    Take 1 tablet (10 mg total) by mouth 2 (two) times daily.   Current Medications    ALENDRONATE (FOSAMAX) 70 MG TABLET    TAKE 1 TABLET (70 MG TOTAL) BY MOUTH EVERY 7 DAYS.    ASCORBIC ACID (VITAMIN C) 500 MG TABLET    Take 500 mg by mouth once daily.    ASPIRIN CHILD 81 MG CHEW    Take by mouth. 1 Tablet, Chewable Oral 4x times weekly    ATORVASTATIN (LIPITOR) 20 MG TABLET    Take 1 tablet (20 mg total) by mouth once daily.    CALCIUM CITRATE-VITAMIN D3 315-200 MG (CITRACAL+D) 315-200 MG-UNIT PER TABLET    Take 1 tablet by mouth 2 (two) times daily.    CEVIMELINE (EVOXAC) 30 MG CAPSULE    Take 1 capsule (30 mg total) by mouth 2 (two) times daily.    DICLOFENAC SODIUM (VOLTAREN) 1 % GEL    Apply 2 g topically 2 (two) times daily.    FLUTICASONE (FLONASE) 50 MCG/ACTUATION NASAL SPRAY    1  Spray each nostril Every day    HYDROXYCHLOROQUINE (PLAQUENIL) 200 MG TABLET    TAKE 2 TABLETS ONCE DAILY    MISCELLANEOUS MEDICAL SUPPLY PACK    1 soft L hand splint for carpal tunnel       Assessment/Plans:-  73 y.o. pleasant female with a history of primary Sjogren's syndrome, inflammatory arthritis   (seropositive RA) and Raynaud's phenomena present to the clinic today for bilateral UE weakness and pain (L>R)     Physical examination - positive for tinel and phalen's sign - suggestive of carpal tunnel.  However, the neuropathy is not similar to nerve disturbution.  Still pending EMG - scheduled for Feb 19.   Limited ROM of L AC joint - concern for bursitis vs OA vs nerve impingement.  ROM of the R AC intact.       At this time, patient which for medical management and decline shoulder injection.  Since pain is bilateral - concern for nerve impingement from cervical spine - will image.     Plan  - EMG of LUE - scheduled for Feb 19  - PT for cervical spine  - Patient failed Meloxicam 7.5mg daily - recommendation to try Meloxicam 15mg  for pain alleviation   - Baclofen 10mg QHS   - If patient fails Meloxicam - consideration for Tramadol 50mg (1/2 tablet) for pain.   - Cervical xray   - Consideration of MRI if symptoms persist and location of origin is determined.   - Continue plaquenil 200mg and cevimeline  - Continue night hand splint     # Follow-up in about 6 weeks (around 3/21/2019).

## 2019-02-05 ENCOUNTER — CLINICAL SUPPORT (OUTPATIENT)
Dept: REHABILITATION | Facility: HOSPITAL | Age: 74
End: 2019-02-05
Payer: MEDICARE

## 2019-02-05 DIAGNOSIS — M54.12 CERVICAL RADICULOPATHY: ICD-10-CM

## 2019-02-05 DIAGNOSIS — R29.3 POSTURE IMBALANCE: ICD-10-CM

## 2019-02-05 DIAGNOSIS — R20.0 NUMBNESS: ICD-10-CM

## 2019-02-05 PROCEDURE — 97022 WHIRLPOOL THERAPY: CPT | Mod: HCNC,PO

## 2019-02-05 PROCEDURE — 97110 THERAPEUTIC EXERCISES: CPT | Mod: HCNC,PO

## 2019-02-05 NOTE — PROGRESS NOTES
Physical Therapy Daily Treatment Note     Name: Urmila Martínez  Clinic Number: [id number]    Therapy Diagnosis:   Encounter Diagnoses   Name Primary?    Cervical radiculopathy     Posture imbalance      Physician: Ana Li, *    Visit Date: 2019    Physician Orders: Therapeutic exercise  Medical Diagnosis: Nerve Impingement  Evaluation Date: 2019  Authorization Period Expiration: 19  Plan of Care Certification Period: 19 to 3/10/19  Visit #/Visits authorized: 3/20    Time In: 10  Time Out: 11  Total Billable Time: 55 minutes (TE-2, MT-2)    Precautions: Standard, RA and Sjorgrens disease    Subjective     Pt reports: this morning she had pain in left superior shoulder. Notes that the nerve symptoms in her left arm, which has really helped in the tolerance. Patient denies improvement in intensity/frequency. Pt. started taking aspirin as the meloxicam was not helping at all. She has been avoiding lifting too many groceries (one bag at a time), active motion of left arm needs assistance from opposite arm due to intense pain deep ache into anterolateral shoulder, sleep disturbance with left sidelying, and reaching bed covers over body.   She was compliant with home exercise program.  Response to previous treatment: no adverse effects  Functional change: none    Pain: 6/10 this AM; with movement it is 4/10  Location: left shoulder    Objective   Cervical ROM: (measured in degrees)     Degrees Quality   Flexion 43     Right SB 30     Left SB 30     Right rotation 75     Left rotation 70        Shoulder Active/ Passive ROM: (measured in degrees)   Shoulder Right UE Left UE   Flexion  WNLs WNLs   Abduction WNLs WNLs   ER WNLs WNLs   IR WNLs WNLs      Sensation: Dermatomes: Impaired: left C5 numbness      Reflexes: C5/T1: 2 bilaterally     Strength: (measured in neutral spine, gradin-5 out of 5)   Cervical MMT   Flexion 3/5   Extension 3/5   Right Side Bend 3/5   Left Side Bend 3/5   Upper  "trap. 4+/5      Upper Extremity Strength: (grading 1-5 out of 5) * indicating pain       Right UE Left UE   Shoulder Flexion: 4/5 4-/5 *   Shoulder Abduction: 4/5 * 4/5   Shoulder ER: by side 4/5 3-/5 * decreased   Shoulder IR: by side 4/5 4/5           Elbow Flexion: 4+/5 4+/5   Elbow Extension: 4+/5 4+/5           Wrist flexion: 4+/5 4+/5   Wrist extension: 4+/5 4+/5           Gross grasp 5 4+           Lower Trap: seated 3/5 superior shoulder 3-/5    Middle Trap: 3/5 3/5   Rhomboids: 3/5 3-/5      Cervical Spine Special Tests: ((+): positive; (-): negative)   Spurling's A -   Spurling's B + bilaterally   Neer's impingement + left   Empty/full can - left   Speed's  - left   Belly Press - left   First Rib +left: 2+/6; right: 3-/6      Nerve Right Left Quality   Median + + numbness   Ulnar - -    Radial - -    + drop arm for pain not inability to hold     Cervical musculature flexibility: (restriction: mild, mod.: moderate, severe grading)   Muscle Flexibility   suboccipitals  mild   upper trapezius mod.   levator scapula mod.   middle scalenes mild   posterior scalenes mild     Pt received moist hot pack to the L shoulder for 8' pre treatment for increased bloodflow and decrease muscle tension.    Urmila received the following manual therapy techniques: Soft tissue Mobilization were applied to the: L upper trap, pec minor and major (clavicular head ) , proximal triceps and biceps brachii for 25 minutes    Urmila received therapeutic exercises to develop strength, ROM and posture for 30 minutes including:    Supine:  · Pec stretch on towel roll: 5'  · Chin tucks: 5" x 20  · Cervical rotations with deflated beach ball: 5" x10 B    Seated:  Scapular retractions: 5" 2x10  · OTB rows and extension: 2x8    Standing:  · ulnar/median: 3x10 bilaterally  Manual therapy: Urmila  received the following manual therapy techniques x 10 min. to include soft tissue and joint mobilizations were applied to the: cervical spine To " include:   Joint mobilization  · subcranial distraction  · left mid to lower cervical up/downslides: specific and general      Home Exercises Provided and Patient Education Provided     Education provided:   - Continue previous HEP and addition of new HEP provided today  - Postural awareness    Written Home Exercises Provided: yes.  Exercises were reviewed and Urmila was able to demonstrate them prior to the end of the session.  Urmila demonstrated good  understanding of the education provided.   See EMR under Patient Instructions for exercises provided 1/15/2019.    Assessment     Pt has made some progress in UE strength and intensity of nerve symptoms; however, residual shoulder pain remains. Pt.'s symptoms have been isolated to the median nerve at this point; which she has been receiving treatment from OT and PT for this. Pt. noted immediate relief after manual therapy and fair tolerance to exercise progression. Will continue to progress as mayur.     Urmila is progressing well towards her goals.   Pt prognosis is Good.     Pt will continue to benefit from skilled outpatient physical therapy to address the deficits listed in the problem list box on initial evaluation, provide pt/family education and to maximize pt's level of independence in the home and community environment.   Pt's spiritual, cultural and educational needs considered and pt agreeable to plan of care and goals.    Anticipated barriers to physical therapy: comorbidities    Goals:   Short Term Goals: 4 weeks  1. Pt. to be independent with symptom management MET  2. Pt. to demonstrate increased MMT for cervical paraspinals to 3/5 to improve tolerance to upright unsupported sitting posture. MET  3. Pt to tolerate HEP to improve ROM and independence with ADLs MET  4. Pt. to report a decrease in UE paresthesia by 25% with OH reach activities MET     Long Term Goals: 8 weeks  1. Pt. to report decreased left shoulder pain </ =  2/10 at worst to increase  tolerance for upright unsupported sitting posture  2. Pt. to demonstrate proper cervical and scapula retraction requiring no verbal cues from PT.  3. Increase bilateral lower cervical joint mobility to 3-/6 to promote greater ease with self care skills.  4. Increase thoracic joint mobility to 2+/6 to promote greater ease with self care skills  5. Pt. to demonstrate increased MMT for cervical paraspinals to 3+/5 to improve endurance with upright unsupported sitting posture  6. Pt. to demonstrate increased MMT for bilateral UE flexion/abduction to 4+/5 to improve tolerance for ADLs and work activities.   8. Pt. to demonstrate increased MMT for mid-trap/lower trap strength to 3+/5 to improve posture stability during OH reaching/lifting tasks.  9. Pt to be independent with HEP to improve ROM and independence with ADL's  10. Pt. to report a decrease in UE paresthesia by at least 50% OH reach/self care skills/household tasks    Plan     Continue POC, monitoring radicular symptoms. Progress strengthening exercises as tolerated.     Lacy Upton, PT

## 2019-02-05 NOTE — PROGRESS NOTES
Occupational Therapy Daily Treatment Note     Name: Urmila DAN [facility] Number: [id number]    Therapy Diagnosis:   Encounter Diagnoses   Name Primary?    Cervical radiculopathy     Posture imbalance     Numbness      Physician: Ana Li, *      Visit # / Visits authorized:  3 of 15  Date of Return to MD: 2-7-19       Time In: 9:00am  Time Out: 10am    Total Billable Time: 60 minutes    Precautions: Standard    Subjective     Pt reports: she was compliant with home exercise program given last session.   Response to previous treatment: has been doing HEP twice a day      Pain: 2/10  Location: hands and wrist     Objective     Urmila received the following supervised modalities after being cleared for contradictions for 20 minutes:   -  Patient received fluidotherapy to (L)  hand(s) for20 minutes to increase blood flow, circulation, pain management and for tissue elasticity prior to therex.       Urmila participated in dynamic functional therapeutic exercises to improve functional performance for  40  minutes, including:   - palm  up gripping yellow putty x 2 minutes   - sh flex, ABD, x 3 minutes for nerve glides and stretching   - (B)  TGE  And wrist exten  with fingers extended in fluido  -UBE X 7 minutes forward and back   -shoulder scaption x 10, shoulder blade squeezes x 10  -wrist isotonics flex/ext/RD 1' ea. #1         Home Exercises and Education Provided       Written Home Exercises Provided:  Has been compliant with HEP, twice daily.Unable to progress to strengthening at this time due to numbness after 1 minutes   Exercises were reviewed and Urmila was able to demonstrate them prior to the end of the session.  Urmila demonstrated good  understanding of the education provided.     Pt received a written copy of exercises to perform at home.   See EMR under patient instructions for exercises given.     Urmila demonstrated good  understanding of the education provided.        Assessment     Pt would continue to benefit from skilled OT. Pt did not have c/o numbness with gripping activity today. Issued yellow putty and instructed on strengthening HEP. Pt will continue with PT at [facility].  Urmila is progressing well towards her goals and there are no updates to goals at this time. Pt prognosis is Good.    Pt continues to be limited in functional and leisurely pursuits. Pain limits patients participation in ADL's. Pt is not able to carryout necessary vocational tasks. Patient continues to requires cues and skilled supervision to complete HEP     Pt will continue to benefit from skilled outpatient occupational therapy to address the deficits listed in the problem list on initial evaluation provide pt/family education and to maximize pt's level of independence in the home and community environment.     Anticipated barriers to occupational therapy: n/a     Pt's spiritual, cultural and educational needs considered and pt agreeable to plan of care and goals.    Goals:  Cont goals from POC    Plan   Continue 1-2x week x 6-8  weeks during the certification period from 1-11-19   to 2-11-19  in pursuit of OT goals.    Discussed Plan of Care with patient: Yes  Updates/Grading for next session: cont with AROM until she can tolerated strengthening, has numbness with gripping       Angela Gongora, OT ,

## 2019-02-07 ENCOUNTER — HOSPITAL ENCOUNTER (OUTPATIENT)
Dept: RADIOLOGY | Facility: HOSPITAL | Age: 74
Discharge: HOME | End: 2019-02-07
Attending: STUDENT IN AN ORGANIZED HEALTH CARE EDUCATION/TRAINING PROGRAM
Payer: MEDICARE

## 2019-02-07 ENCOUNTER — OFFICE VISIT (OUTPATIENT)
Dept: RHEUMATOLOGY | Facility: CLINIC | Age: 74
End: 2019-02-07
Payer: MEDICARE

## 2019-02-07 VITALS
DIASTOLIC BLOOD PRESSURE: 68 MMHG | HEIGHT: 65 IN | BODY MASS INDEX: 31.26 KG/M2 | WEIGHT: 187.63 LBS | HEART RATE: 68 BPM | SYSTOLIC BLOOD PRESSURE: 119 MMHG

## 2019-02-07 DIAGNOSIS — M54.12 CERVICAL RADICULOPATHY: ICD-10-CM

## 2019-02-07 DIAGNOSIS — R20.0 NUMBNESS: ICD-10-CM

## 2019-02-07 DIAGNOSIS — M15.9 PRIMARY OSTEOARTHRITIS INVOLVING MULTIPLE JOINTS: ICD-10-CM

## 2019-02-07 DIAGNOSIS — M35.00 SJOGREN'S SYNDROME, WITH UNSPECIFIED ORGAN INVOLVEMENT: ICD-10-CM

## 2019-02-07 DIAGNOSIS — M54.12 CERVICAL RADICULOPATHY: Primary | ICD-10-CM

## 2019-02-07 PROCEDURE — 3074F PR MOST RECENT SYSTOLIC BLOOD PRESSURE < 130 MM HG: ICD-10-PCS | Mod: CPTII,GC,S$GLB, | Performed by: STUDENT IN AN ORGANIZED HEALTH CARE EDUCATION/TRAINING PROGRAM

## 2019-02-07 PROCEDURE — 1101F PT FALLS ASSESS-DOCD LE1/YR: CPT | Mod: CPTII,GC,S$GLB, | Performed by: STUDENT IN AN ORGANIZED HEALTH CARE EDUCATION/TRAINING PROGRAM

## 2019-02-07 PROCEDURE — 3074F SYST BP LT 130 MM HG: CPT | Mod: CPTII,GC,S$GLB, | Performed by: STUDENT IN AN ORGANIZED HEALTH CARE EDUCATION/TRAINING PROGRAM

## 2019-02-07 PROCEDURE — 3078F DIAST BP <80 MM HG: CPT | Mod: CPTII,GC,S$GLB, | Performed by: STUDENT IN AN ORGANIZED HEALTH CARE EDUCATION/TRAINING PROGRAM

## 2019-02-07 PROCEDURE — 72050 XR CERVICAL SPINE COMPLETE 5 VIEW: ICD-10-PCS | Mod: 26,HCNC,, | Performed by: RADIOLOGY

## 2019-02-07 PROCEDURE — 99499 RISK ADDL DX/OHS AUDIT: ICD-10-PCS | Mod: HCNC,S$GLB,, | Performed by: INTERNAL MEDICINE

## 2019-02-07 PROCEDURE — 3078F PR MOST RECENT DIASTOLIC BLOOD PRESSURE < 80 MM HG: ICD-10-PCS | Mod: CPTII,GC,S$GLB, | Performed by: STUDENT IN AN ORGANIZED HEALTH CARE EDUCATION/TRAINING PROGRAM

## 2019-02-07 PROCEDURE — 99214 PR OFFICE/OUTPT VISIT, EST, LEVL IV, 30-39 MIN: ICD-10-PCS | Mod: GC,S$GLB,, | Performed by: STUDENT IN AN ORGANIZED HEALTH CARE EDUCATION/TRAINING PROGRAM

## 2019-02-07 PROCEDURE — 99999 PR PBB SHADOW E&M-EST. PATIENT-LVL V: CPT | Mod: PBBFAC,HCNC,GC, | Performed by: STUDENT IN AN ORGANIZED HEALTH CARE EDUCATION/TRAINING PROGRAM

## 2019-02-07 PROCEDURE — 99499 UNLISTED E&M SERVICE: CPT | Mod: HCNC,S$GLB,, | Performed by: INTERNAL MEDICINE

## 2019-02-07 PROCEDURE — 72050 X-RAY EXAM NECK SPINE 4/5VWS: CPT | Mod: 26,HCNC,, | Performed by: RADIOLOGY

## 2019-02-07 PROCEDURE — 99999 PR PBB SHADOW E&M-EST. PATIENT-LVL V: ICD-10-PCS | Mod: PBBFAC,HCNC,GC, | Performed by: STUDENT IN AN ORGANIZED HEALTH CARE EDUCATION/TRAINING PROGRAM

## 2019-02-07 PROCEDURE — 99214 OFFICE O/P EST MOD 30 MIN: CPT | Mod: GC,S$GLB,, | Performed by: STUDENT IN AN ORGANIZED HEALTH CARE EDUCATION/TRAINING PROGRAM

## 2019-02-07 PROCEDURE — 1101F PR PT FALLS ASSESS DOC 0-1 FALLS W/OUT INJ PAST YR: ICD-10-PCS | Mod: CPTII,GC,S$GLB, | Performed by: STUDENT IN AN ORGANIZED HEALTH CARE EDUCATION/TRAINING PROGRAM

## 2019-02-07 PROCEDURE — 72050 X-RAY EXAM NECK SPINE 4/5VWS: CPT | Mod: TC,HCNC

## 2019-02-07 RX ORDER — BACLOFEN 10 MG/1
10 TABLET ORAL 2 TIMES DAILY
Qty: 60 TABLET | Refills: 0 | Status: SHIPPED | OUTPATIENT
Start: 2019-02-07 | End: 2019-05-08

## 2019-02-07 ASSESSMENT — ROUTINE ASSESSMENT OF PATIENT INDEX DATA (RAPID3)
FATIGUE SCORE: .5
PSYCHOLOGICAL DISTRESS SCORE: 1.1
WHEN YOU AWAKENED IN THE MORNING OVER THE LAST WEEK, PLEASE INDICATE THE AMOUNT OF TIME IT TAKES UNTIL YOU ARE AS LIMBER AS YOU WILL BE FOR THE DAY: 1HRS
TOTAL RAPID3 SCORE: 5.89
PAIN SCORE: 6
MDHAQ FUNCTION SCORE: 2
PATIENT GLOBAL ASSESSMENT SCORE: 5
AM STIFFNESS SCORE: 1, YES

## 2019-02-07 NOTE — PROGRESS NOTES
74 year old female with sjogren's syndrome  Seropositive RA  Raynaud's+    On plaquenil  Eye exam June 2018  On evoxac    Sicca symptoms stable  Raynaud's managed conservatively    Joints do well but had a flare nov    Left arm discomfort since nov  Now b/l arm discomfort  Nov RA flare for 3 days  All joints got better  Left shoulder pain persists  She used a brace for hand and it made the left wrist/hand pain worse    Left shoulder impinges  Pain and numbness middle finger    Types a lot    Sleeps on the sides    Takes NSAIDs,helps    Worse in the mornings    Exam :left shoulder impingement  Neck exam normal    She refused injections  NSAIDs help minimally  PT is helping  EMG/NCS pending   Left shoulder xray deg changes    CT soft splint brace  OT  If no improvement,rtc  NSAIDs vs steroids oral vs injections    Today    Will do C spine xray  Suspect C spine arthritis    Neck PT     Prn tramadol    If symptoms dont improve will consider MRI C spine vs shoulders

## 2019-02-07 NOTE — PATIENT INSTRUCTIONS
Please start to take Meloxicam 15mg to see if it alleviate the pain.  If it does not after 2-4 hours then take 1/2 tablet of tramadol.  You can take baclofen at night.

## 2019-02-11 ENCOUNTER — PATIENT MESSAGE (OUTPATIENT)
Dept: RHEUMATOLOGY | Facility: CLINIC | Age: 74
End: 2019-02-11

## 2019-02-11 DIAGNOSIS — M06.9 RHEUMATOID ARTHRITIS INVOLVING MULTIPLE SITES, UNSPECIFIED RHEUMATOID FACTOR PRESENCE: ICD-10-CM

## 2019-02-11 DIAGNOSIS — M15.9 PRIMARY OSTEOARTHRITIS INVOLVING MULTIPLE JOINTS: ICD-10-CM

## 2019-02-11 DIAGNOSIS — M54.12 CERVICAL RADICULOPATHY: Primary | ICD-10-CM

## 2019-02-11 RX ORDER — MELOXICAM 15 MG/1
15 TABLET ORAL DAILY
Qty: 90 TABLET | Refills: 3 | Status: SHIPPED | OUTPATIENT
Start: 2019-02-11 | End: 2019-03-13

## 2019-02-13 ENCOUNTER — TELEPHONE (OUTPATIENT)
Dept: RHEUMATOLOGY | Facility: CLINIC | Age: 74
End: 2019-02-13

## 2019-02-13 NOTE — TELEPHONE ENCOUNTER
Called and spoke with patient about results.     Pain is controlled with meloxicam 15mg daily.  Refilled provided to [facility].  Taking 1/2 baclofen.  Does not require tramadol.     Scheduled for EMG next week.     Still having some neuropathy symptoms of the R hand. L hand resolved    Patient wish to continue PT for DJD of the cervical spine at this time.  Inquired if patient would like referral to PM&R for other treatment of neck/shoulder pain from DJD of the cervical.  Declined it at this time.

## 2019-02-19 ENCOUNTER — PROCEDURE VISIT (OUTPATIENT)
Dept: NEUROLOGY | Facility: CLINIC | Age: 74
End: 2019-02-19
Payer: MEDICARE

## 2019-02-19 DIAGNOSIS — Z86.69 HISTORY OF NERVE IMPINGEMENT: ICD-10-CM

## 2019-02-19 DIAGNOSIS — G56.00 CARPAL TUNNEL SYNDROME, UNSPECIFIED LATERALITY: ICD-10-CM

## 2019-02-19 PROCEDURE — 95886 PR EMG COMPLETE, W/ NERVE CONDUCTION STUDIES, 5+ MUSCLES: ICD-10-PCS | Mod: HCNC,S$GLB,, | Performed by: NEUROMUSCULOSKELETAL MEDICINE & OMM

## 2019-02-19 PROCEDURE — 95911 PR NERVE CONDUCTION STUDY; 9-10 STUDIES: ICD-10-PCS | Mod: HCNC,S$GLB,, | Performed by: NEUROMUSCULOSKELETAL MEDICINE & OMM

## 2019-02-19 PROCEDURE — 95886 MUSC TEST DONE W/N TEST COMP: CPT | Mod: HCNC,S$GLB,, | Performed by: NEUROMUSCULOSKELETAL MEDICINE & OMM

## 2019-02-19 PROCEDURE — 95911 NRV CNDJ TEST 9-10 STUDIES: CPT | Mod: HCNC,S$GLB,, | Performed by: NEUROMUSCULOSKELETAL MEDICINE & OMM

## 2019-02-22 ENCOUNTER — PATIENT MESSAGE (OUTPATIENT)
Dept: RHEUMATOLOGY | Facility: CLINIC | Age: 74
End: 2019-02-22

## 2019-02-25 ENCOUNTER — PATIENT MESSAGE (OUTPATIENT)
Dept: RHEUMATOLOGY | Facility: CLINIC | Age: 74
End: 2019-02-25

## 2019-02-26 ENCOUNTER — CLINICAL SUPPORT (OUTPATIENT)
Dept: REHABILITATION | Facility: HOSPITAL | Age: 74
End: 2019-02-26
Payer: MEDICARE

## 2019-02-26 DIAGNOSIS — M54.12 CERVICAL RADICULOPATHY: Primary | ICD-10-CM

## 2019-02-26 DIAGNOSIS — R29.3 POSTURE IMBALANCE: ICD-10-CM

## 2019-02-26 PROCEDURE — 97110 THERAPEUTIC EXERCISES: CPT | Mod: HCNC

## 2019-02-26 PROCEDURE — G8979 MOBILITY GOAL STATUS: HCPCS | Mod: CJ,HCNC

## 2019-02-26 PROCEDURE — 97162 PT EVAL MOD COMPLEX 30 MIN: CPT | Mod: HCNC

## 2019-02-26 PROCEDURE — G8978 MOBILITY CURRENT STATUS: HCPCS | Mod: CK,HCNC

## 2019-02-26 NOTE — PLAN OF CARE
Physical Therapy Initial Evaluation     Name: Urmila Martínez  [facility] Number: [id number]    Diagnosis:   Encounter Diagnoses   Name Primary?    Cervical radiculopathy Yes    Posture imbalance      Physician: Ana Li, *  Treatment Orders: PT Eval and Treat  Past Medical History:   Diagnosis Date    Arthritis     Asymptomatic PVCs     Benign liver cyst 09/26/2012    Fibrocystic breast 1995    left     Fibrocystic breast 1995    left    Fibrocystic breast 1997    right    Joint pain     Kidney stones 07/20/2012    NS (nuclear sclerosis) 7/19/2013    Osteopenia     Raynaud's disease     Rheumatoid arthritis(714.0)     Sjogren's disease      Current Outpatient Medications   Medication Sig    alendronate (FOSAMAX) 70 MG tablet TAKE 1 TABLET (70 MG TOTAL) BY MOUTH EVERY 7 DAYS.    ascorbic acid (VITAMIN C) 500 MG tablet Take 500 mg by mouth once daily.    Aspirin Child 81 mg Chew Take by mouth. 1 Tablet, Chewable Oral 4x times weekly    atorvastatin (LIPITOR) 20 MG tablet Take 1 tablet (20 mg total) by mouth once daily.    baclofen (LIORESAL) 10 MG tablet Take 1 tablet (10 mg total) by mouth 2 (two) times daily.    calcium citrate-vitamin D3 315-200 mg (CITRACAL+D) 315-200 mg-unit per tablet Take 1 tablet by mouth 2 (two) times daily.    cevimeline (EVOXAC) 30 mg capsule Take 1 capsule (30 mg total) by mouth 2 (two) times daily.    diclofenac sodium (VOLTAREN) 1 % Gel Apply 2 g topically 2 (two) times daily.    fluticasone (FLONASE) 50 mcg/actuation nasal spray 1  Spray each nostril Every day    hydroxychloroquine (PLAQUENIL) 200 mg tablet TAKE 2 TABLETS ONCE DAILY    meloxicam (MOBIC) 15 MG tablet Take 1 tablet (15 mg total) by mouth once daily.    miscellaneous medical supply Pack 1 soft L hand splint for carpal tunnel     No current facility-administered medications for this visit.      Review of patient's allergies indicates:    Allergen Reactions    Doxycycline      Other reaction(s): vomit  Other reaction(s): Hives    Sulfa (sulfonamide antibiotics) Nausea And Vomiting       Time In: 1:00  Time Out: 2:10    Evaluation Date: 2/26/19  Visit # authorized: 1/1  Authorization period: 2/7/20  Plan of care Expiration: 4/9/19  MD referral: cervical radiculopathy    HPI: Urmila is a 74y.o. female that presents to [facility] secondary to cervical radiculopathy and L shoulder pain. Onset occurred approximately: 4 months ago after flare up of RA. Pt. presents with the following co-morbidities and personal factors that directly impact her plan of care: RA and Sjogren's disease.       Subjective     Patient reports she had a flare up of rheumatoid arthritis 4 months ago. She was going to PT and OT at [facility] clinic for current condition, but this clinic is more convenient and  is also attending PT at this clinic. Pt c/o pain in lateral shoulder, pressure in elbow and pain in dorsal distal ulnar region and tingling into palmar aspect of all fingers. Primary complaint is pain in muscles of lateral shoulder. She has been doing PT exercises that were previously given to her with benefit but is unable to perform several of them due to pain. Decreased but some pain when sleeping. Mornings usually worse than night. Has to get started with exercises in morning to decrease pain. She sleeps with pillow under arm on back, more comfortable sleeping on side. Pt  Reports weakness in opening jars, and was having difficulty driving.  She reports she can't go swimming as much as she used to. She was going 1 hr daily. Can't do forward stroke, decreased pain with backstroke. Only lifts 1 or 2 grocery bags at a time. Some difficulty reaching plates in cabinet or heavy things overhead. She reports catching with lifting arm. No trouble with laundry. Difficulty lifting arm above shoulder height.    Radicular symptoms:   "denies  Diagnostic Imaging: x-ray c-spine 2/7/19:  "FINDINGS: Mild DJD.  The C5/C6 disc space is narrowed.  Mild foraminal encroachment identified at the same level bilaterally.  The other disc spaces are well maintained.  No fracture or dislocation.  No bone destruction identified"          Pain Scale: Urmila rates pain on a scale of 0-10 to be 2 currently ; 6 at worst; 1 at best .  Onset: insidious  Aggravating factors: lifting arm swimming, opening jars  Easing factors:  Rest, meloxicam 15 mg, muscle relaxer, exercises, heat, ice for numbness when she had it  Prior Therapy: Pt for arthritis.   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 Denies  bowel/bladder symptoms. Recent weight loss? Denies Constant/Night pain that is unchanging with change of position? denies PMH of CA? denies      Objective   Posture Alignment: forward head, increased kyphosis     Palpation: LHB, lateral shoulder, infraspinatus muscle belly, supraspinatus muscle belly    Cervical ROM: (measured in degrees)     Degrees   extension 35   Flexion 43; 30 pulling   Right SB 40   Left SB 30 pulling and "crackling"   Right rotation 55   Left rotation 50      Shoulder Active/ Passive ROM: (measured in degrees)   Shoulder Right UE Left UE   Flexion  WNLs WNLs pain at 45 deg    Abduction WNLs WNLs pain at 45   ER WNL 90 pain at 30    IR WNLs 70 pain at 10      Sensation: Dermatomes: Intact    Reflexes: C5/T1: 2 bilaterally     Upper Extremity Strength: (grading 1-5 out of 5) * indicating pain       Right UE Left UE   Shoulder Flexion: 4/5 4-/5 *   Shoulder Abduction: 4/5 4/5*   Shoulder ER: by side 4/5 3-/5 *   Shoulder IR: by side 4/5 4/5*           Elbow " Flexion: 4+/5 4+/5   Elbow Extension: 4+/5 4+/5           Wrist flexion: 4+/5 4+/5   Wrist extension: 4+/5 4+/5           Gross grasp 5 4+           Lower Trap: seated 3/5 superior shoulder 3-/5    Middle Trap: 3/5 3/5   Rhomboids: 3/5 3-/5      Cervical Spine Special Tests: ((+): positive; (-): negative)   Spurling's A -   Spurling's B  -   Neer's impingement -   Empty/full can + empty left   Speed's  - left   Belly Press - left   First Rib  -, TTP B      Nerve Right Left Quality   Median + + numbness   Ulnar - -     Radial - -       Tingling into interscapular region with scap depression    FLEXIBILITY: moderately decreased UT/LS, pectorals    SEGMENTAL MOBILITY: TTP L C3-4 facets decreased cervical mobility throughout, mild relief with distraction      Strength: (measured in neutral spine, gradin-5 out of 5)  (copied from 19)  Cervical MMT   Flexion 3/5   Extension 3/5   Right Side Bend 3/5   Left Side Bend 3/5   Upper trap. 4+/5       Pt/family was provided educational information, including: role of PT, goals for PT, scheduling - pt verbalized understanding. Discussed insurance limitations with pt.     TREATMENT     PT Evaluation Completed? Yes  Discussed Plan of Care with patient: Yes    Urmila received 15 minutes of therapeutic exercise including:  Chin tucks  Chin tuck with sb 10x ea  Rotations  Median nerve glide  Abd/er isometric- mild pain with abd, instructed to only perform ER if abd causes pain    MHP provided to L neck/shoulder for 10 min post tx with relief and no adverse effects.     Written Home Exercises Provided: HEP2GO code [id number] (see handout)   Urmila demo good understanding of the education provided. Patient demo good return demo of skill of exercises.    Assessment     Patient is a 74 y.o. female referred to outpatient physical therapy who presents with a PT diagnosis of cervical radiculopathy and L shoulder pain demonstrating joint dysfunction, decreased AROM, impaired motor  control, poor posture, UE paresthesias, decreased strength and functional limitation as described below. Level of complexity is moderate;  based on patient's past medical history including the below co-morbidities and personal factors; functional limitations, and clinical presentation directly impacting his/her plan of care. Pt demonstrates good rehab potential. Pt will benefit from physcial therapy services in order to maximize pain free functional mobility. The following goals were discussed with the patient and patient is in agreement with them as to be addressed in the treatment plan. Pt was given a HEP consisting of shoulder isometric, chin tucks, rotations, median nerve glides. Pt verbally understood the instructions as they were given and demonstrated proper form and technique during therapy. Pt was advised to perform these exercises free of pain, and to stop performing them if pain occurs.         History  Co-morbidities and personal factors that may impact the plan of care Examination  Body Structures and Functions, activity limitations and participation restrictions that may impact the plan of care Clinical Presentation   Decision Making/ Complexity Score   Co-morbidities:   RA, Sjorgrens disease            Personal Factors:   age Body Regions: neck  upper extremities    Body Systems: ROM  strength  motor control      Activity limitations: lifting and carrying objects  fine hand use (grasping/picking up)      Participation Restrictions: swimming, physical activity, ADLs       stable and uncomplicated   moderate       Medical necessity is demonstrated by the following IMPAIRMENTS/PROBLEMS:   1) Pain limiting function   2) Postural dysfunction   3) Longus colli/suboccipital tightness   4) Upper trapezius/levator scapula tightness   5) Longus colli/scapula stabilizer weakness    6) Decreased cervical/shoulder ROM    7) Decreased cervical and thoracic joint mobility   8) Decreased UT/levator scapula/scalenes  soft tissue extensibility   9) Lack of HEP   10) UE paresthesia    Pt's spiritual, cultural and educational needs considered and pt agreeable to plan of care and goals as stated below:     Anticipated Barriers for physical therapy: chronic neck pain    Short Term GOALS: 3 weeks. Pt agrees with goals set.  1. Patient demonstrates independence with HEP.   2. Patient demonstrates independence with Postural Awareness.   3. Patient demonstrates independence with body mechanics.     Long Term GOALS: 6 weeks. Pt agrees with goals set.  1. Patient demonstrates increased cervical ROM by 5 deg to improve tolerance to functional activities.   2. Patient demonstrates increased strength BUE's by 1 MMT grade or greater to improve tolerance to functional activities.   3. Patient demonstrates improved overall function per FOTO Neck Survey to 34% or less.     Functional Limitations Reports - G Codes  Category: Mobility  Tool: FOTO Neck Survey  Score: 41% Limitation   TEST SCORE  Modifier  Impairment Limitation Restriction   0  CH  0 % impaired, limited or restricted   1-9  CI  @ least 1% but less than 20% impaired, limited or restricted   10- 19  CJ  @ least 20%<40% impaired, limited or restricted   20- 29  CK  @ least 40%<60% impaired, limited or restricted   30- 39  CL  @ least 60% <80% impaired, limited or restricted   40- 49  CM  @ least 80%<100% impaired limited or restricted   50/50  CN  100% impaired, limited or restricted     Current/  CK = 41% Limitation  Goal/ : CJ = 34% Limitation    PLAN   Pt will be treated by physical therapy 1-3 times a week for 6 weeks for pt. education, HEP, therapeutic exercises, neuromuscular re-education, joint/soft tissue mobilizations, and modalities prn to achieve established goals. Urmila may at times be seen by a PTA as part of the Rehab Team.     Therapist: Marium Valenzuela, PT, DPT  2/26/2019     I certify the need for these services furnished under this plan of treatment and while  under my care.  ______________________________ Physician/Referring Practitioner  Date of Signature

## 2019-02-26 NOTE — PROGRESS NOTES
Physical Therapy Initial Evaluation     Name: Urmila Martínez  [facility] Number: [id number]    Diagnosis:   Encounter Diagnoses   Name Primary?    Cervical radiculopathy Yes    Posture imbalance      Physician: Ana Li, *  Treatment Orders: PT Eval and Treat  Past Medical History:   Diagnosis Date    Arthritis     Asymptomatic PVCs     Benign liver cyst 09/26/2012    Fibrocystic breast 1995    left     Fibrocystic breast 1995    left    Fibrocystic breast 1997    right    Joint pain     Kidney stones 07/20/2012    NS (nuclear sclerosis) 7/19/2013    Osteopenia     Raynaud's disease     Rheumatoid arthritis(714.0)     Sjogren's disease      Current Outpatient Medications   Medication Sig    alendronate (FOSAMAX) 70 MG tablet TAKE 1 TABLET (70 MG TOTAL) BY MOUTH EVERY 7 DAYS.    ascorbic acid (VITAMIN C) 500 MG tablet Take 500 mg by mouth once daily.    Aspirin Child 81 mg Chew Take by mouth. 1 Tablet, Chewable Oral 4x times weekly    atorvastatin (LIPITOR) 20 MG tablet Take 1 tablet (20 mg total) by mouth once daily.    baclofen (LIORESAL) 10 MG tablet Take 1 tablet (10 mg total) by mouth 2 (two) times daily.    calcium citrate-vitamin D3 315-200 mg (CITRACAL+D) 315-200 mg-unit per tablet Take 1 tablet by mouth 2 (two) times daily.    cevimeline (EVOXAC) 30 mg capsule Take 1 capsule (30 mg total) by mouth 2 (two) times daily.    diclofenac sodium (VOLTAREN) 1 % Gel Apply 2 g topically 2 (two) times daily.    fluticasone (FLONASE) 50 mcg/actuation nasal spray 1  Spray each nostril Every day    hydroxychloroquine (PLAQUENIL) 200 mg tablet TAKE 2 TABLETS ONCE DAILY    meloxicam (MOBIC) 15 MG tablet Take 1 tablet (15 mg total) by mouth once daily.    miscellaneous medical supply Pack 1 soft L hand splint for carpal tunnel     No current facility-administered medications for this visit.      Review of patient's allergies indicates:    Allergen Reactions    Doxycycline      Other reaction(s): vomit  Other reaction(s): Hives    Sulfa (sulfonamide antibiotics) Nausea And Vomiting       Time In: 1:00  Time Out: 2:10    Evaluation Date: 2/26/19  Visit # authorized: 1/1  Authorization period: 2/7/20  Plan of care Expiration: 4/9/19  MD referral: cervical radiculopathy    HPI: Urmila is a 74y.o. female that presents to [facility] secondary to cervical radiculopathy and L shoulder pain. Onset occurred approximately: 4 months ago after flare up of RA. Pt. presents with the following co-morbidities and personal factors that directly impact her plan of care: RA and Sjogren's disease.       Subjective     Patient reports she had a flare up of rheumatoid arthritis 4 months ago. She was going to PT and OT at [facility] clinic for current condition, but this clinic is more convenient and  is also attending PT at this clinic. Pt c/o pain in lateral shoulder, pressure in elbow and pain in dorsal distal ulnar region and tingling into palmar aspect of all fingers. Primary complaint is pain in muscles of lateral shoulder. She has been doing PT exercises that were previously given to her with benefit but is unable to perform several of them due to pain. Decreased but some pain when sleeping. Mornings usually worse than night. Has to get started with exercises in morning to decrease pain. She sleeps with pillow under arm on back, more comfortable sleeping on side. Pt  Reports weakness in opening jars, and was having difficulty driving.  She reports she can't go swimming as much as she used to. She was going 1 hr daily. Can't do forward stroke, decreased pain with backstroke. Only lifts 1 or 2 grocery bags at a time. Some difficulty reaching plates in cabinet or heavy things overhead. She reports catching with lifting arm. No trouble with laundry. Difficulty lifting arm above shoulder height.    Radicular symptoms:   "denies  Diagnostic Imaging: x-ray c-spine 2/7/19:  "FINDINGS: Mild DJD.  The C5/C6 disc space is narrowed.  Mild foraminal encroachment identified at the same level bilaterally.  The other disc spaces are well maintained.  No fracture or dislocation.  No bone destruction identified"          Pain Scale: Urmila rates pain on a scale of 0-10 to be 2 currently ; 6 at worst; 1 at best .  Onset: insidious  Aggravating factors: lifting arm swimming, opening jars  Easing factors:  Rest, meloxicam 15 mg, muscle relaxer, exercises, heat, ice for numbness when she had it  Prior Therapy: Pt for arthritis.   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 Denies  bowel/bladder symptoms. Recent weight loss? Denies Constant/Night pain that is unchanging with change of position? denies PMH of CA? denies      Objective   Posture Alignment: forward head, increased kyphosis     Palpation: LHB, lateral shoulder, infraspinatus muscle belly, supraspinatus muscle belly    Cervical ROM: (measured in degrees)     Degrees   extension 35   Flexion 43; 30 pulling   Right SB 40   Left SB 30 pulling and "crackling"   Right rotation 55   Left rotation 50      Shoulder Active/ Passive ROM: (measured in degrees)   Shoulder Right UE Left UE   Flexion  WNLs WNLs pain at 45 deg    Abduction WNLs WNLs pain at 45   ER WNL 90 pain at 30    IR WNLs 70 pain at 10      Sensation: Dermatomes: Intact    Reflexes: C5/T1: 2 bilaterally     Upper Extremity Strength: (grading 1-5 out of 5) * indicating pain       Right UE Left UE   Shoulder Flexion: 4/5 4-/5 *   Shoulder Abduction: 4/5 4/5*   Shoulder ER: by side 4/5 3-/5 *   Shoulder IR: by side 4/5 4/5*           Elbow " Flexion: 4+/5 4+/5   Elbow Extension: 4+/5 4+/5           Wrist flexion: 4+/5 4+/5   Wrist extension: 4+/5 4+/5           Gross grasp 5 4+           Lower Trap: seated 3/5 superior shoulder 3-/5    Middle Trap: 3/5 3/5   Rhomboids: 3/5 3-/5      Cervical Spine Special Tests: ((+): positive; (-): negative)   Spurling's A -   Spurling's B  -   Neer's impingement -   Empty/full can + empty left   Speed's  - left   Belly Press - left   First Rib  -, TTP B      Nerve Right Left Quality   Median + + numbness   Ulnar - -     Radial - -       Tingling into interscapular region with scap depression    FLEXIBILITY: moderately decreased UT/LS, pectorals    SEGMENTAL MOBILITY: TTP L C3-4 facets decreased cervical mobility throughout, mild relief with distraction      Strength: (measured in neutral spine, gradin-5 out of 5)  (copied from 19)  Cervical MMT   Flexion 3/5   Extension 3/5   Right Side Bend 3/5   Left Side Bend 3/5   Upper trap. 4+/5       Pt/family was provided educational information, including: role of PT, goals for PT, scheduling - pt verbalized understanding. Discussed insurance limitations with pt.     TREATMENT     PT Evaluation Completed? Yes  Discussed Plan of Care with patient: Yes    Urmila received 15 minutes of therapeutic exercise including:  Chin tucks  Chin tuck with sb 10x ea  Rotations  Median nerve glide  Abd/er isometric- mild pain with abd, instructed to only perform ER if abd causes pain    MHP provided to L neck/shoulder for 10 min post tx with relief and no adverse effects.     Written Home Exercises Provided: HEP2GO code [id number] (see handout)   Urmila demo good understanding of the education provided. Patient demo good return demo of skill of exercises.    Assessment     Patient is a 74 y.o. female referred to outpatient physical therapy who presents with a PT diagnosis of cervical radiculopathy and L shoulder pain demonstrating joint dysfunction, decreased AROM, impaired motor  control, poor posture, UE paresthesias, decreased strength and functional limitation as described below. Level of complexity is moderate;  based on patient's past medical history including the below co-morbidities and personal factors; functional limitations, and clinical presentation directly impacting his/her plan of care. Pt demonstrates good rehab potential. Pt will benefit from physcial therapy services in order to maximize pain free functional mobility. The following goals were discussed with the patient and patient is in agreement with them as to be addressed in the treatment plan. Pt was given a HEP consisting of shoulder isometric, chin tucks, rotations, median nerve glides. Pt verbally understood the instructions as they were given and demonstrated proper form and technique during therapy. Pt was advised to perform these exercises free of pain, and to stop performing them if pain occurs.         History  Co-morbidities and personal factors that may impact the plan of care Examination  Body Structures and Functions, activity limitations and participation restrictions that may impact the plan of care Clinical Presentation   Decision Making/ Complexity Score   Co-morbidities:   RA, Sjorgrens disease            Personal Factors:   age Body Regions: neck  upper extremities    Body Systems: ROM  strength  motor control      Activity limitations: lifting and carrying objects  fine hand use (grasping/picking up)      Participation Restrictions: swimming, physical activity, ADLs       stable and uncomplicated   moderate       Medical necessity is demonstrated by the following IMPAIRMENTS/PROBLEMS:   1) Pain limiting function   2) Postural dysfunction   3) Longus colli/suboccipital tightness   4) Upper trapezius/levator scapula tightness   5) Longus colli/scapula stabilizer weakness    6) Decreased cervical/shoulder ROM    7) Decreased cervical and thoracic joint mobility   8) Decreased UT/levator scapula/scalenes  soft tissue extensibility   9) Lack of HEP   10) UE paresthesia    Pt's spiritual, cultural and educational needs considered and pt agreeable to plan of care and goals as stated below:     Anticipated Barriers for physical therapy: chronic neck pain    Short Term GOALS: 3 weeks. Pt agrees with goals set.  1. Patient demonstrates independence with HEP.   2. Patient demonstrates independence with Postural Awareness.   3. Patient demonstrates independence with body mechanics.     Long Term GOALS: 6 weeks. Pt agrees with goals set.  1. Patient demonstrates increased cervical ROM by 5 deg to improve tolerance to functional activities.   2. Patient demonstrates increased strength BUE's by 1 MMT grade or greater to improve tolerance to functional activities.   3. Patient demonstrates improved overall function per FOTO Neck Survey to 34% or less.     Functional Limitations Reports - G Codes  Category: Mobility  Tool: FOTO Neck Survey  Score: 41% Limitation   TEST SCORE  Modifier  Impairment Limitation Restriction   0  CH  0 % impaired, limited or restricted   1-9  CI  @ least 1% but less than 20% impaired, limited or restricted   10- 19  CJ  @ least 20%<40% impaired, limited or restricted   20- 29  CK  @ least 40%<60% impaired, limited or restricted   30- 39  CL  @ least 60% <80% impaired, limited or restricted   40- 49  CM  @ least 80%<100% impaired limited or restricted   50/50  CN  100% impaired, limited or restricted     Current/  CK = 41% Limitation  Goal/ : CJ = 34% Limitation    PLAN   Pt will be treated by physical therapy 1-3 times a week for 6 weeks for pt. education, HEP, therapeutic exercises, neuromuscular re-education, joint/soft tissue mobilizations, and modalities prn to achieve established goals. Urmila may at times be seen by a PTA as part of the Rehab Team.     Therapist: Marium Valenzuela, PT, DPT  2/26/2019     I certify the need for these services furnished under this plan of treatment and while  under my care.  ______________________________ Physician/Referring Practitioner  Date of Signature

## 2019-02-27 ENCOUNTER — PATIENT MESSAGE (OUTPATIENT)
Dept: NEUROLOGY | Facility: CLINIC | Age: 74
End: 2019-02-27

## 2019-02-27 ENCOUNTER — TELEPHONE (OUTPATIENT)
Dept: RHEUMATOLOGY | Facility: CLINIC | Age: 74
End: 2019-02-27

## 2019-02-27 NOTE — TELEPHONE ENCOUNTER
"Called patient to inform her of her EMG results - showing mild L carpal tunnel.     Patient feels better with current medication regimen - meloxicam 15mg and baclofen 10mg QHS.  She continues to wear the splint at night.  Still having some LUE discomfort - "pain in the muscle".      Started PT for cervical spine and OT for hand.   "

## 2019-03-04 RX ORDER — HYDROXYCHLOROQUINE SULFATE 200 MG/1
TABLET, FILM COATED ORAL
Qty: 180 TABLET | Refills: 0 | Status: SHIPPED | OUTPATIENT
Start: 2019-03-04 | End: 2019-06-06 | Stop reason: SDUPTHER

## 2019-03-08 ENCOUNTER — CLINICAL SUPPORT (OUTPATIENT)
Dept: REHABILITATION | Facility: HOSPITAL | Age: 74
End: 2019-03-08
Payer: MEDICARE

## 2019-03-08 DIAGNOSIS — M54.12 CERVICAL RADICULOPATHY: Primary | ICD-10-CM

## 2019-03-08 DIAGNOSIS — R29.3 POSTURE IMBALANCE: ICD-10-CM

## 2019-03-08 PROCEDURE — 97110 THERAPEUTIC EXERCISES: CPT | Mod: HCNC

## 2019-03-08 NOTE — PROGRESS NOTES
Physical Therapy Daily Treatment Note     Name: Urmila DAN [facility] Number: [id number]    Therapy Diagnosis:   Encounter Diagnoses   Name Primary?    Cervical radiculopathy Yes    Posture imbalance      Physician: Ana Li, *    Visit Date: 3/8/2019  Evaluation Date: 2/26/19  Visit # authorized: 1/1  Authorization period: 2/7/20  Plan of care Expiration: 4/9/19  MD referral: cervical radiculopathy    Time In: 10:30  Time Out: 10:20  Total Billable Time: 30 minutes    Precautions: Standard    Subjective     Pt reports: The shoulder had an .  She was compliant with home exercise program.  Response to previous treatment: Good  Functional change: none noted    Pain: 4/10  Location: left shoulder      Objective     Urmila received therapeutic exercises to develop strength, endurance, ROM, flexibility and posture for 30 minutes including:    UBE 2/2 min   Chin tucks 20x   Chin tuck with sb 10x ea  Rotations 20x  Median nerve glide 20x  Abd/er isometric- mild pain with abd, instructed to only perform ER if abd causes pain  Pendulums   AAROM shoulder flexion 3x10   Shoulder extension Peach TB 2x10   Ball roll up on wall 2x10       Urmila received hot pack for 10 minutes to.        Home Exercises Provided and Patient Education Provided     Education provided:   - Importance of HEP and use of ice to manage symptoms.    Written Home Exercises Provided: yes.  Exercises were reviewed and Urmila was able to demonstrate them prior to the end of the session.  Urmila demonstrated good  understanding of the education provided.     See EMR under Patient Instructions for exercises provided prior visit.    Assessment   Patient tolerates exercises well today. She has difficulty with eccentric movements of the L shoulder. Was better with AAROM of shoulder flexion exercises.       Patient is a 74 y.o. female referred to outpatient physical therapy who presents with a PT diagnosis of cervical radiculopathy and L shoulder  pain demonstrating joint dysfunction, decreased AROM, impaired motor control, poor posture, UE paresthesias, decreased strength and functional limitation as described below. Level of complexity is moderate;  based on patient's past medical history including the below co-morbidities and personal factors; functional limitations, and clinical presentation directly impacting his/her plan of care. Pt demonstrates good rehab potential. Pt will benefit from physcial therapy services in order to maximize pain free functional mobility. The following goals were discussed with the patient and patient is in agreement with them as to be addressed in the treatment plan. Pt was given a HEP consisting of shoulder isometric, chin tucks, rotations, median nerve glides. Pt verbally understood the instructions as they were given and demonstrated proper form and technique during therapy. Pt was advised to perform these exercises free of pain, and to stop performing them if pain occurs.      Urmila is progressing well towards her goals.   Pt prognosis is Good.     Pt will continue to benefit from skilled outpatient physical therapy to address the deficits listed in the problem list box on initial evaluation, provide pt/family education and to maximize pt's level of independence in the home and community environment.     Pt's spiritual, cultural and educational needs considered and pt agreeable to plan of care and goals.    Anticipated barriers to physical therapy: None    Goals:   Short Term GOALS: 3 weeks. Pt agrees with goals set.  1. Patient demonstrates independence with HEP.   2. Patient demonstrates independence with Postural Awareness.   3. Patient demonstrates independence with body mechanics.      Long Term GOALS: 6 weeks. Pt agrees with goals set.  1. Patient demonstrates increased cervical ROM by 5 deg to improve tolerance to functional activities.   2. Patient demonstrates increased strength BUE's by 1 MMT grade or greater to  improve tolerance to functional activities.   3. Patient demonstrates improved overall function per FOTO Neck Survey to 34% or less.     Plan     Pt will be treated by physical therapy 1-3 times a week for 6 weeks for pt. education, HEP, therapeutic exercises, neuromuscular re-education, joint/soft tissue mobilizations, and modalities prn to achieve established goals. Urmila may at times be seen by a PTA as part of the Rehab Team.    Nirmal Gaming, PT

## 2019-03-13 ENCOUNTER — CLINICAL SUPPORT (OUTPATIENT)
Dept: REHABILITATION | Facility: HOSPITAL | Age: 74
End: 2019-03-13
Payer: MEDICARE

## 2019-03-13 DIAGNOSIS — M54.12 CERVICAL RADICULOPATHY: Primary | ICD-10-CM

## 2019-03-13 DIAGNOSIS — R29.3 POSTURE IMBALANCE: ICD-10-CM

## 2019-03-13 PROCEDURE — 97110 THERAPEUTIC EXERCISES: CPT | Mod: HCNC

## 2019-03-13 NOTE — PROGRESS NOTES
"  Physical Therapy Daily Treatment Note     Name: Urmila DAN [place]  Clinic Number: [id number]    Therapy Diagnosis:   Encounter Diagnoses   Name Primary?    Cervical radiculopathy Yes    Posture imbalance      Physician: Ana Li, *    Visit Date: 3/13/2019  Evaluation Date: 2/26/19  Visit # authorized: 1/1  Authorization period: 2/7/20  Plan of care Expiration: 4/9/19  MD referral: cervical radiculopathy    Time In: 4:00  Time Out: 4:56  Total Billable Time: 30 minutes    Precautions: Standard    Subjective     Pt reports: reached w/ L UE last night and felt tingling down arm. Better today after HP and HEP. L UE feels weak today  She was compliant with home exercise program.  Response to previous treatment: Good  Functional change: none noted    Pain: 0/10  Location: left shoulder      Objective     Urmila received therapeutic exercises to develop strength, endurance, ROM, flexibility and posture for 46 minutes including:    UBE 2/2 min   Chin tucks 20x   Chin tuck with sb 10x ea  Rotations 20x  Median nerve glide 20x  Abd/er isometric- mild pain with abd, instructed to only perform ER if abd causes pain  Pendulums #3 30x CW/CCW/side to side  AAROM shoulder flexion 3x10   Shoulder extension Peach TB 3x10   Ball roll up on wall 2x10 5" hol    Supine serratus punch 2x10  Supine scaption 2x10  Supine HZ abd 2x10 YTB    Urmila received hot pack for 10 minutes to.        Home Exercises Provided and Patient Education Provided     Education provided:   - Importance of HEP and use of ice to manage symptoms.    Written Home Exercises Provided: yes.  Exercises were reviewed and Urmila was able to demonstrate them prior to the end of the session.  Urmila demonstrated good  understanding of the education provided.     See EMR under Patient Instructions for exercises provided prior visit.    Assessment   Patient tolerates exercises well today. Pt is not experiencing pain today but L UE feels weak. Able to perform AAROM " FL w/ wand W/ B UE moving at same time. Added periscap strengthening exs as above. Pt states she has been noticing some jaw pain w/ trouble opening mainly in the am. This has started since therapy. States she has ever had TMJ. Will try chin tuck supine to see if makes a differenc. Cont to progress as mayur.        Patient is a 74 y.o. female referred to outpatient physical therapy who presents with a PT diagnosis of cervical radiculopathy and L shoulder pain demonstrating joint dysfunction, decreased AROM, impaired motor control, poor posture, UE paresthesias, decreased strength and functional limitation as described below. Level of complexity is moderate;  based on patient's past medical history including the below co-morbidities and personal factors; functional limitations, and clinical presentation directly impacting his/her plan of care. Pt demonstrates good rehab potential. Pt will benefit from physcial therapy services in order to maximize pain free functional mobility. The following goals were discussed with the patient and patient is in agreement with them as to be addressed in the treatment plan. Pt was given a HEP consisting of shoulder isometric, chin tucks, rotations, median nerve glides. Pt verbally understood the instructions as they were given and demonstrated proper form and technique during therapy. Pt was advised to perform these exercises free of pain, and to stop performing them if pain occurs.      Urmila is progressing well towards her goals.   Pt prognosis is Good.     Pt will continue to benefit from skilled outpatient physical therapy to address the deficits listed in the problem list box on initial evaluation, provide pt/family education and to maximize pt's level of independence in the home and community environment.     Pt's spiritual, cultural and educational needs considered and pt agreeable to plan of care and goals.    Anticipated barriers to physical therapy: None    Goals:   Short  Term GOALS: 3 weeks. Pt agrees with goals set.  1. Patient demonstrates independence with HEP.   2. Patient demonstrates independence with Postural Awareness.   3. Patient demonstrates independence with body mechanics.      Long Term GOALS: 6 weeks. Pt agrees with goals set.  1. Patient demonstrates increased cervical ROM by 5 deg to improve tolerance to functional activities.   2. Patient demonstrates increased strength BUE's by 1 MMT grade or greater to improve tolerance to functional activities.   3. Patient demonstrates improved overall function per FOTO Neck Survey to 34% or less.     Plan     Pt will be treated by physical therapy 1-3 times a week for 6 weeks for pt. education, HEP, therapeutic exercises, neuromuscular re-education, joint/soft tissue mobilizations, and modalities prn to achieve established goals. Urmila may at times be seen by a PTA as part of the Rehab Team.    Gertrude Bloom, WILLIE

## 2019-03-19 NOTE — PROGRESS NOTES
"                                                     RHEUMATOLOGY CLINIC FOLLOW UP VISIT  Chief complaints:-Bilateral UE weakness      HPI:-  Urmila House a 74 y.o.  pleasant female with a history of primary Sjogren's syndrome, inflammatory arthritis   (seropositive RA) and Raynaud's phenomena present to the clinic today for L hand numbness that started since mid Nov.      RA controlled and is currently on plaquenil for many years.  Last exam was June 2018.  Noticed a flare of multiple joints a few weeks ago.  Usually resolves in 3 days with NSAIDS.  She did that and all other joints arthralgia improved except for LUE (elbow, wrist, and hand).  Patient continues to feel joint pain and neuropathy.  She feels like numb sensation of the finger tips and it varies which fingers.  Associated symptoms includes weakness - have not drop anything yet.  Symptoms worsen when she wakes up and has to be "massaged" out.  Previously worked in IT - typed a lot.  Went to see PCP - tried a hard/metal splint which worsen the symptoms.  She used it for just 10 days.  Stopped fosmax because of the pain.  Currently complaining of L shoulder discomfort and limited ROM secondary to pain.  Sleeps on her side.  Denies any trauma/falls.       Sjogren controlled.  Does not require eye drops or mouth lubricant.  Using cevimeline daily     Raynaud's controlled with gloves when it's under 60 degrees.      Osteoporosis on fosmax    Interval History    Patient completed PT/OT for LUE and bilateral carpal tunnel.  Symptoms improved but still there.  Wearing bilateral hand brace in the PM - neuropathy symptoms resolve.  Patient still having LUE weakness and pain is alleviated with Meloxicam.  Meloxicam 15mg caused constipation - so patient only taking 7.5 as needed.    EMG done in Feb showed mild L carpal tunnel.      Experience flare 3 days ago.  Diffused joint aches and R 2nd PIP swelling/tenderness.  Symptoms improving since onset of symptoms.  " Denies any recent trauma to area.  Previous flare was similar in presentation.  Complaint with all medication (plaquenil 400mg daily)         Review of Systems   Constitutional: Negative for chills, diaphoresis, fever, malaise/fatigue and weight loss.   HENT: Negative for congestion, ear discharge, ear pain, hearing loss, nosebleeds, sinus pain and tinnitus.    Eyes: Negative for photophobia, pain, discharge and redness.   Respiratory: Negative for cough, hemoptysis, sputum production, shortness of breath, wheezing and stridor.    Cardiovascular: Negative for chest pain, palpitations, orthopnea, claudication, leg swelling and PND.   Gastrointestinal: Negative for abdominal pain, constipation, diarrhea, heartburn, nausea and vomiting.   Genitourinary: Negative for dysuria, frequency, hematuria and urgency.   Musculoskeletal: Positive for joint pain and myalgias. Negative for back pain and neck pain.   Skin: Negative for rash.   Neurological: Negative for dizziness, tingling, tremors, weakness and headaches.   Endo/Heme/Allergies: Does not bruise/bleed easily.   Psychiatric/Behavioral: Negative for depression, hallucinations and suicidal ideas. The patient is not nervous/anxious and does not have insomnia.        Past Medical History:   Diagnosis Date    Arthritis     Asymptomatic PVCs     Benign liver cyst 09/26/2012    Fibrocystic breast 1995    left     Fibrocystic breast 1995    left    Fibrocystic breast 1997    right    Joint pain     Kidney stones 07/20/2012    NS (nuclear sclerosis) 7/19/2013    Osteopenia     Raynaud's disease     Rheumatoid arthritis(714.0)     Sjogren's disease        Past Surgical History:   Procedure Laterality Date    BREAST BIOPSY Bilateral     2 times - core needle right breast, biopsy left breast: fibrocystic findings    CHOLECYSTECTOMY  1990's     COLONOSCOPY N/A 10/20/2015    Performed by SHARRON Mcdonnell MD at [facility] (4TH FLR)    CYST REMOVAL      right ankle -  "benign cyst removed at 7yrs old     CYST REMOVAL  about 2010    cyst removed from forehead     TONSILLECTOMY, ADENOIDECTOMY      during childhood         Social History     Tobacco Use    Smoking status: Never Smoker    Smokeless tobacco: Never Used   Substance Use Topics    Alcohol use: Yes     Comment: limited- glass a wine a few times a year    Drug use: No       Family History   Problem Relation Age of Onset    Liver cancer Mother         liver and colon    Cancer Mother         colon, liver    Heart disease Mother         CABG    Colon cancer Father     Lung cancer Father         thinks colon was first    Cancer Father         colon, lung    Heart disease Brother         multiple bypass    Diabetes Brother     Allergies Daughter     Asthma Daughter     Asthma Grandchild     Cancer Maternal Aunt         stomach    Cancer Maternal Grandmother         breast    Breast cancer Maternal Grandmother     Heart disease Maternal Grandfather     COPD Paternal Grandfather     Cancer Maternal Aunt         thyroid    Fabry's disease Cousin     Amblyopia Neg Hx     Blindness Neg Hx     Cataracts Neg Hx     Glaucoma Neg Hx     Hypertension Neg Hx     Macular degeneration Neg Hx     Retinal detachment Neg Hx     Strabismus Neg Hx     Stroke Neg Hx     Thyroid disease Neg Hx     Melanoma Neg Hx        Review of patient's allergies indicates:   Allergen Reactions    Doxycycline      Other reaction(s): vomit  Other reaction(s): Hives    Sulfa (sulfonamide antibiotics) Nausea And Vomiting           Physical examination:-    Vitals:    03/21/19 1036   BP: 135/77   Pulse: 78   Weight: 83.5 kg (184 lb 1.4 oz)   Height: 5' 5" (1.651 m)   PainSc:   6       Physical Exam   Constitutional: She is oriented to person, place, and time and well-developed, well-nourished, and in no distress.   HENT:   Head: Normocephalic and atraumatic.   Eyes: Conjunctivae are normal. Pupils are equal, round, and reactive " to light.   Neck: Normal range of motion. Neck supple.   Cardiovascular: Normal rate, regular rhythm and normal heart sounds.   Pulmonary/Chest: Effort normal and breath sounds normal.   Abdominal: Soft. Bowel sounds are normal.   Musculoskeletal:   +tinel and phalen's sign of the L wrist.  L shoulder - limited ROM due to pain.  + lift off test.  Decreased ext/int rotation of the L shoulder.  +painful arc  Strength and ROM decreased with LUE due to pain    Neurological: She is alert and oriented to person, place, and time. Gait normal.   Skin: Skin is warm and dry.   Psychiatric: Mood, memory, affect and judgment normal.       Labs:-  Results for ONEL ORONA (MRN [id number]) as of 2/7/2019 09:30   Ref. Range 11/30/2018 14:55 12/13/2018 12:51   WBC Latest Ref Range: 3.90 - 12.70 K/uL 6.27    RBC Latest Ref Range: 4.00 - 5.40 M/uL 4.15    Hemoglobin Latest Ref Range: 12.0 - 16.0 g/dL 12.7    Hematocrit Latest Ref Range: 37.0 - 48.5 % 38.7    MCV Latest Ref Range: 82 - 98 fL 93    MCH Latest Ref Range: 27.0 - 31.0 pg 30.6    MCHC Latest Ref Range: 32.0 - 36.0 g/dL 32.8    RDW Latest Ref Range: 11.5 - 14.5 % 11.9    Platelets Latest Ref Range: 150 - 350 K/uL 215    MPV Latest Ref Range: 9.2 - 12.9 fL 10.4    Sed Rate Latest Ref Range: 0 - 36 mm/Hr 16    Sodium Latest Ref Range: 136 - 145 mmol/L 144    Potassium Latest Ref Range: 3.5 - 5.1 mmol/L 4.0    Chloride Latest Ref Range: 95 - 110 mmol/L 109    CO2 Latest Ref Range: 23 - 29 mmol/L 29    Anion Gap Latest Ref Range: 8 - 16 mmol/L 6 (L)    BUN, Bld Latest Ref Range: 8 - 23 mg/dL 16    Creatinine Latest Ref Range: 0.5 - 1.4 mg/dL 0.9    eGFR if non African American Latest Ref Range: >60 mL/min/1.73 m^2 >60    eGFR if  Latest Ref Range: >60 mL/min/1.73 m^2 >60    Glucose Latest Ref Range: 70 - 110 mg/dL 96    Calcium Latest Ref Range: 8.7 - 10.5 mg/dL 9.4    CRP Latest Ref Range: 0.0 - 8.2 mg/L 5.6      Radiographs:-  Independent visualization of  images done.   Xray fingers - unchanged osseous density at the base of the 1st distal phalanx  Xray hand - obliquely oriented linear lucency along the proximal phalanx of the thumb  Shoulder xray - mild DJD of the AC joint. No fx.     Medication List with Changes/Refills   New Medications    FOLIC ACID (FOLVITE) 1 MG TABLET    Take 1 tablet (1 mg total) by mouth once daily.    METHOTREXATE 2.5 MG TAB    Take 4 tablets (10 mg total) by mouth every 7 days.    METHYLPREDNISOLONE (MEDROL DOSEPACK) 4 MG TABLET    use as directed   Current Medications    ALENDRONATE (FOSAMAX) 70 MG TABLET    TAKE 1 TABLET (70 MG TOTAL) BY MOUTH EVERY 7 DAYS.    ASCORBIC ACID (VITAMIN C) 500 MG TABLET    Take 500 mg by mouth once daily.    ASPIRIN CHILD 81 MG CHEW    Take by mouth. 1 Tablet, Chewable Oral 4x times weekly    ATORVASTATIN (LIPITOR) 20 MG TABLET    Take 1 tablet (20 mg total) by mouth once daily.    BACLOFEN (LIORESAL) 10 MG TABLET    Take 1 tablet (10 mg total) by mouth 2 (two) times daily.    CALCIUM CITRATE-VITAMIN D3 315-200 MG (CITRACAL+D) 315-200 MG-UNIT PER TABLET    Take 1 tablet by mouth 2 (two) times daily.    CEVIMELINE (EVOXAC) 30 MG CAPSULE    Take 1 capsule (30 mg total) by mouth 2 (two) times daily.    DICLOFENAC SODIUM (VOLTAREN) 1 % GEL    Apply 2 g topically 2 (two) times daily.    FLUTICASONE (FLONASE) 50 MCG/ACTUATION NASAL SPRAY    1  Spray each nostril Every day    HYDROXYCHLOROQUINE (PLAQUENIL) 200 MG TABLET    TAKE 2 TABLETS ONCE DAILY    MELOXICAM (MOBIC) 15 MG TABLET    Take 15 mg by mouth once daily.    MISCELLANEOUS MEDICAL SUPPLY PACK    1 soft L hand splint for carpal tunnel       Assessment/Plans:-  73 y.o. pleasant female with a history of primary Sjogren's syndrome, inflammatory arthritis   (seropositive RA) and Raynaud's phenomena present to the clinic today for bilateral UE weakness and pain (L>R)     Physical examination - positive for tinel and phalen's sign - suggestive of carpal tunnel.   However, the neuropathy is not similar to nerve disturbution.  Still pending EMG - scheduled for Feb 19.   Limited ROM of L AC joint - concern for bursitis vs OA vs nerve impingement.  ROM of the R AC intact.       Patient wish to have L shoulder injection today    Multiple flares (one in Nov 2018) and now.  Plaquenil does not appear to be sufficient for RA.  Will add MTX 10mg Q weekly with folic acid.       Plan  - Start MTX 10mg Q weekly - Education provided on potential side effects and how to take it   - Start folic acid 1mg daily   - Labs to be done today: CBC, CMP, ESR, CRP, PreDMARDS  - Labs to be done in 1 months - CBC, CMP, ESR, CRP  - Start Medrol dose cee - for flare  - Education provided to monitor for elevated BP - if high, please call PCP immediately  - Continue PT for cervical spine and carpal tunnel  - Continue Meloxicam 7.5mg PRN for pain   - Continue plaquenil 400mg and cevimeline  - Continue night hand splint     # Follow-up in about 3 months (around 6/21/2019).

## 2019-03-21 ENCOUNTER — OFFICE VISIT (OUTPATIENT)
Dept: RHEUMATOLOGY | Facility: CLINIC | Age: 74
End: 2019-03-21
Payer: MEDICARE

## 2019-03-21 ENCOUNTER — LAB VISIT (OUTPATIENT)
Dept: LAB | Facility: HOSPITAL | Age: 74
End: 2019-03-21
Payer: MEDICARE

## 2019-03-21 VITALS
DIASTOLIC BLOOD PRESSURE: 77 MMHG | HEIGHT: 65 IN | BODY MASS INDEX: 30.67 KG/M2 | WEIGHT: 184.06 LBS | SYSTOLIC BLOOD PRESSURE: 135 MMHG | HEART RATE: 78 BPM

## 2019-03-21 DIAGNOSIS — M15.9 PRIMARY OSTEOARTHRITIS INVOLVING MULTIPLE JOINTS: ICD-10-CM

## 2019-03-21 DIAGNOSIS — M85.80 OSTEOPENIA DETERMINED BY X-RAY: ICD-10-CM

## 2019-03-21 DIAGNOSIS — Z11.4 ENCOUNTER FOR SCREENING FOR HIV: ICD-10-CM

## 2019-03-21 DIAGNOSIS — M06.9 RHEUMATOID ARTHRITIS INVOLVING MULTIPLE SITES, UNSPECIFIED RHEUMATOID FACTOR PRESENCE: ICD-10-CM

## 2019-03-21 DIAGNOSIS — M71.9 BURSITIS, UNSPECIFIED SITE: ICD-10-CM

## 2019-03-21 DIAGNOSIS — G56.00 CARPAL TUNNEL SYNDROME, UNSPECIFIED LATERALITY: ICD-10-CM

## 2019-03-21 DIAGNOSIS — M54.12 CERVICAL RADICULOPATHY: ICD-10-CM

## 2019-03-21 DIAGNOSIS — M35.00 SJOGREN'S SYNDROME, WITH UNSPECIFIED ORGAN INVOLVEMENT: ICD-10-CM

## 2019-03-21 DIAGNOSIS — M54.12 CERVICAL RADICULOPATHY: Primary | ICD-10-CM

## 2019-03-21 LAB
ALBUMIN SERPL BCP-MCNC: 4.2 G/DL
ALP SERPL-CCNC: 75 U/L
ALT SERPL W/O P-5'-P-CCNC: 12 U/L
ANION GAP SERPL CALC-SCNC: 10 MMOL/L
AST SERPL-CCNC: 23 U/L
BASOPHILS # BLD AUTO: 0.13 K/UL
BASOPHILS NFR BLD: 1.6 %
BILIRUB SERPL-MCNC: 1 MG/DL
BUN SERPL-MCNC: 16 MG/DL
CALCIUM SERPL-MCNC: 10.3 MG/DL
CHLORIDE SERPL-SCNC: 104 MMOL/L
CO2 SERPL-SCNC: 26 MMOL/L
CREAT SERPL-MCNC: 0.9 MG/DL
CRP SERPL-MCNC: 3.5 MG/L
DIFFERENTIAL METHOD: NORMAL
EOSINOPHIL # BLD AUTO: 0.1 K/UL
EOSINOPHIL NFR BLD: 1.3 %
ERYTHROCYTE [DISTWIDTH] IN BLOOD BY AUTOMATED COUNT: 11.8 %
ERYTHROCYTE [SEDIMENTATION RATE] IN BLOOD BY WESTERGREN METHOD: 15 MM/HR
EST. GFR  (AFRICAN AMERICAN): >60 ML/MIN/1.73 M^2
EST. GFR  (NON AFRICAN AMERICAN): >60 ML/MIN/1.73 M^2
GLUCOSE SERPL-MCNC: 94 MG/DL
HBV CORE AB SERPL QL IA: NEGATIVE
HBV SURFACE AB SER-ACNC: NEGATIVE M[IU]/ML
HBV SURFACE AG SERPL QL IA: NEGATIVE
HCT VFR BLD AUTO: 41.7 %
HCV AB SERPL QL IA: NEGATIVE
HEPATITIS A ANTIBODY, IGG: NEGATIVE
HGB BLD-MCNC: 13.7 G/DL
HIV 1+2 AB+HIV1 P24 AG SERPL QL IA: NEGATIVE
IMM GRANULOCYTES # BLD AUTO: 0.02 K/UL
IMM GRANULOCYTES NFR BLD AUTO: 0.3 %
LYMPHOCYTES # BLD AUTO: 2.6 K/UL
LYMPHOCYTES NFR BLD: 33.2 %
MCH RBC QN AUTO: 30.5 PG
MCHC RBC AUTO-ENTMCNC: 32.9 G/DL
MCV RBC AUTO: 93 FL
MONOCYTES # BLD AUTO: 0.7 K/UL
MONOCYTES NFR BLD: 8.6 %
NEUTROPHILS # BLD AUTO: 4.3 K/UL
NEUTROPHILS NFR BLD: 55 %
NRBC BLD-RTO: 0 /100 WBC
PLATELET # BLD AUTO: 264 K/UL
PMV BLD AUTO: 10.8 FL
POTASSIUM SERPL-SCNC: 4 MMOL/L
PROT SERPL-MCNC: 8.1 G/DL
RBC # BLD AUTO: 4.49 M/UL
SODIUM SERPL-SCNC: 140 MMOL/L
WBC # BLD AUTO: 7.89 K/UL

## 2019-03-21 PROCEDURE — 20610 DRAIN/INJ JOINT/BURSA W/O US: CPT | Mod: HCNC,LT,GC,S$GLB | Performed by: STUDENT IN AN ORGANIZED HEALTH CARE EDUCATION/TRAINING PROGRAM

## 2019-03-21 PROCEDURE — 20610 LARGE JOINT ASPIRATION/INJECTION: L SUBACROMIAL BURSA: ICD-10-PCS | Mod: HCNC,LT,GC,S$GLB | Performed by: STUDENT IN AN ORGANIZED HEALTH CARE EDUCATION/TRAINING PROGRAM

## 2019-03-21 PROCEDURE — 99214 PR OFFICE/OUTPT VISIT, EST, LEVL IV, 30-39 MIN: ICD-10-PCS | Mod: HCNC,25,GC,S$GLB | Performed by: STUDENT IN AN ORGANIZED HEALTH CARE EDUCATION/TRAINING PROGRAM

## 2019-03-21 PROCEDURE — 3075F PR MOST RECENT SYSTOLIC BLOOD PRESS GE 130-139MM HG: ICD-10-PCS | Mod: HCNC,CPTII,GC,S$GLB | Performed by: STUDENT IN AN ORGANIZED HEALTH CARE EDUCATION/TRAINING PROGRAM

## 2019-03-21 PROCEDURE — 99999 PR PBB SHADOW E&M-EST. PATIENT-LVL III: CPT | Mod: PBBFAC,HCNC,GC, | Performed by: STUDENT IN AN ORGANIZED HEALTH CARE EDUCATION/TRAINING PROGRAM

## 2019-03-21 PROCEDURE — 99214 OFFICE O/P EST MOD 30 MIN: CPT | Mod: HCNC,25,GC,S$GLB | Performed by: STUDENT IN AN ORGANIZED HEALTH CARE EDUCATION/TRAINING PROGRAM

## 2019-03-21 PROCEDURE — 86704 HEP B CORE ANTIBODY TOTAL: CPT | Mod: HCNC

## 2019-03-21 PROCEDURE — 3075F SYST BP GE 130 - 139MM HG: CPT | Mod: HCNC,CPTII,GC,S$GLB | Performed by: STUDENT IN AN ORGANIZED HEALTH CARE EDUCATION/TRAINING PROGRAM

## 2019-03-21 PROCEDURE — 85025 COMPLETE CBC W/AUTO DIFF WBC: CPT | Mod: HCNC

## 2019-03-21 PROCEDURE — 80053 COMPREHEN METABOLIC PANEL: CPT | Mod: HCNC

## 2019-03-21 PROCEDURE — 1101F PT FALLS ASSESS-DOCD LE1/YR: CPT | Mod: HCNC,CPTII,GC,S$GLB | Performed by: STUDENT IN AN ORGANIZED HEALTH CARE EDUCATION/TRAINING PROGRAM

## 2019-03-21 PROCEDURE — 85652 RBC SED RATE AUTOMATED: CPT | Mod: HCNC

## 2019-03-21 PROCEDURE — 86787 VARICELLA-ZOSTER ANTIBODY: CPT | Mod: HCNC

## 2019-03-21 PROCEDURE — 1101F PR PT FALLS ASSESS DOC 0-1 FALLS W/OUT INJ PAST YR: ICD-10-PCS | Mod: HCNC,CPTII,GC,S$GLB | Performed by: STUDENT IN AN ORGANIZED HEALTH CARE EDUCATION/TRAINING PROGRAM

## 2019-03-21 PROCEDURE — 86703 HIV-1/HIV-2 1 RESULT ANTBDY: CPT | Mod: HCNC

## 2019-03-21 PROCEDURE — 87340 HEPATITIS B SURFACE AG IA: CPT | Mod: HCNC

## 2019-03-21 PROCEDURE — 86706 HEP B SURFACE ANTIBODY: CPT | Mod: HCNC

## 2019-03-21 PROCEDURE — 86790 VIRUS ANTIBODY NOS: CPT | Mod: HCNC

## 2019-03-21 PROCEDURE — 3078F DIAST BP <80 MM HG: CPT | Mod: HCNC,CPTII,GC,S$GLB | Performed by: STUDENT IN AN ORGANIZED HEALTH CARE EDUCATION/TRAINING PROGRAM

## 2019-03-21 PROCEDURE — 86682 HELMINTH ANTIBODY: CPT | Mod: HCNC

## 2019-03-21 PROCEDURE — 86592 SYPHILIS TEST NON-TREP QUAL: CPT | Mod: HCNC

## 2019-03-21 PROCEDURE — 86140 C-REACTIVE PROTEIN: CPT | Mod: HCNC

## 2019-03-21 PROCEDURE — 3078F PR MOST RECENT DIASTOLIC BLOOD PRESSURE < 80 MM HG: ICD-10-PCS | Mod: HCNC,CPTII,GC,S$GLB | Performed by: STUDENT IN AN ORGANIZED HEALTH CARE EDUCATION/TRAINING PROGRAM

## 2019-03-21 PROCEDURE — 99999 PR PBB SHADOW E&M-EST. PATIENT-LVL III: ICD-10-PCS | Mod: PBBFAC,HCNC,GC, | Performed by: STUDENT IN AN ORGANIZED HEALTH CARE EDUCATION/TRAINING PROGRAM

## 2019-03-21 PROCEDURE — 86803 HEPATITIS C AB TEST: CPT | Mod: HCNC

## 2019-03-21 RX ORDER — MELOXICAM 15 MG/1
15 TABLET ORAL DAILY
COMMUNITY
End: 2020-03-26 | Stop reason: SDUPTHER

## 2019-03-21 RX ORDER — TRIAMCINOLONE ACETONIDE 40 MG/ML
40 INJECTION, SUSPENSION INTRA-ARTICULAR; INTRAMUSCULAR
Status: DISCONTINUED | OUTPATIENT
Start: 2019-03-21 | End: 2019-03-21 | Stop reason: HOSPADM

## 2019-03-21 RX ORDER — METHOTREXATE 2.5 MG/1
10 TABLET ORAL
Qty: 16 TABLET | Refills: 11 | Status: SHIPPED | OUTPATIENT
Start: 2019-03-21 | End: 2019-05-08

## 2019-03-21 RX ORDER — FOLIC ACID 1 MG/1
1 TABLET ORAL DAILY
Qty: 30 TABLET | Refills: 11 | Status: SHIPPED | OUTPATIENT
Start: 2019-03-21 | End: 2019-08-15

## 2019-03-21 RX ORDER — METHYLPREDNISOLONE 4 MG/1
TABLET ORAL
Qty: 1 PACKAGE | Refills: 0 | Status: SHIPPED | OUTPATIENT
Start: 2019-03-21 | End: 2019-04-02

## 2019-03-21 RX ADMIN — TRIAMCINOLONE ACETONIDE 40 MG: 40 INJECTION, SUSPENSION INTRA-ARTICULAR; INTRAMUSCULAR at 11:03

## 2019-03-21 ASSESSMENT — ROUTINE ASSESSMENT OF PATIENT INDEX DATA (RAPID3)
PAIN SCORE: 5.5
PSYCHOLOGICAL DISTRESS SCORE: 1.1
AM STIFFNESS SCORE: 1, YES
MDHAQ FUNCTION SCORE: .5
PATIENT GLOBAL ASSESSMENT SCORE: 5
FATIGUE SCORE: 4
TOTAL RAPID3 SCORE: 4.06

## 2019-03-21 NOTE — PROCEDURES
Large Joint Aspiration/Injection: L subacromial bursa  Date/Time: 3/21/2019 11:40 AM  Performed by: Ana Li MD  Authorized by: Adán Marquez MD     Consent Done?:  Yes (Verbal)  Indications:  Pain and joint swelling  Procedure site marked: Yes    Timeout: Prior to procedure the correct patient, procedure, and site was verified      Location:  Shoulder  Site:  L subacromial bursa  Prep: Patient was prepped and draped in usual sterile fashion    Ultrasonic Guidance for needle placement: No  Needle size:  25 G  Approach:  Anterolateral  Medications:  40 mg triamcinolone acetonide 40 mg/mL  Aspirate amount (ml):  0  Patient tolerance:  Patient tolerated the procedure well with no immediate complications

## 2019-03-21 NOTE — PROGRESS NOTES
74 year old female with sjogren's syndrome  Seropositive RA  Raynaud's+     On plaquenil  Eye exam June 2018  On evoxac     Sicca symptoms stable  Raynaud's managed conservatively    Last RA flare 3 days ago in the PIP    Left shoulder pain persists  She has left shoulder OA    New diagnosis of CTS+  Splints helping  OT helping    C spine OA : PT and meloxicam    Plan    Mtx : 4 tabs weekly with folic acid  Second flare of her RA since nov 2018    Continue plaquenil    Labs     Inject left shoulder /GH joint     Medrol dose pack

## 2019-03-21 NOTE — PATIENT INSTRUCTIONS
Start  - MTX (4 tablets) once a week   - folic acid daily  - Medrol dose pack - until completion     Continue  - plaquenil 2 tablets daily  - meloxicam as needed    Continue physical therapy and night time splint     Labs to be done today  - repeat labs in 1 month  - repeat labs before next visit    Do not sleep on L side for 48hours and minimal strenuous activities of the affected area

## 2019-03-21 NOTE — PROGRESS NOTES
I have reviewed the notes, assessments, documentation by  and I agree with the recommendations    74 year old female with sjogren's syndrome  Seropositive RA  Raynaud's+     On plaquenil  Eye exam June 2018  On evoxac     Sicca symptoms stable  Raynaud's managed conservatively     Last RA flare 3 days ago in the PIP     Left shoulder pain persists  She has left shoulder OA     New diagnosis of CTS+  Splints helping  OT helping     C spine OA : PT and meloxicam     Plan     Mtx : 4 tabs weekly with folic acid  Second flare of her RA since nov 2018     Continue plaquenil     Labs      Inject left shoulder subacromial bursa      Medrol dose pack

## 2019-03-22 ENCOUNTER — CLINICAL SUPPORT (OUTPATIENT)
Dept: REHABILITATION | Facility: HOSPITAL | Age: 74
End: 2019-03-22
Payer: MEDICARE

## 2019-03-22 DIAGNOSIS — R29.3 POSTURE IMBALANCE: ICD-10-CM

## 2019-03-22 DIAGNOSIS — M54.12 CERVICAL RADICULOPATHY: Primary | ICD-10-CM

## 2019-03-22 LAB
RPR SER QL: NORMAL
STRONGYLOIDES ANTIBODY IGG: NEGATIVE

## 2019-03-22 PROCEDURE — G8979 MOBILITY GOAL STATUS: HCPCS | Mod: CJ,HCNC

## 2019-03-22 PROCEDURE — 97110 THERAPEUTIC EXERCISES: CPT | Mod: HCNC

## 2019-03-22 PROCEDURE — G8978 MOBILITY CURRENT STATUS: HCPCS | Mod: CK,HCNC

## 2019-03-22 NOTE — PROGRESS NOTES
Physical Therapy Daily Note       Name: Urmila DAN [facility] Number: [id number]  Diagnosis:   Encounter Diagnoses   Name Primary?    Cervical radiculopathy Yes    Posture imbalance      Physician: Ana Li, *  Treatment Orders: PT Eval and Treat  Past Medical History:   Diagnosis Date    Arthritis     Asymptomatic PVCs     Benign liver cyst 09/26/2012    Fibrocystic breast 1995    left     Fibrocystic breast 1995    left    Fibrocystic breast 1997    right    Joint pain     Kidney stones 07/20/2012    NS (nuclear sclerosis) 7/19/2013    Osteopenia     Raynaud's disease     Rheumatoid arthritis(714.0)     Sjogren's disease      Current Outpatient Medications   Medication Sig    alendronate (FOSAMAX) 70 MG tablet TAKE 1 TABLET (70 MG TOTAL) BY MOUTH EVERY 7 DAYS.    ascorbic acid (VITAMIN C) 500 MG tablet Take 500 mg by mouth once daily.    Aspirin Child 81 mg Chew Take by mouth. 1 Tablet, Chewable Oral 4x times weekly    atorvastatin (LIPITOR) 20 MG tablet Take 1 tablet (20 mg total) by mouth once daily.    baclofen (LIORESAL) 10 MG tablet Take 1 tablet (10 mg total) by mouth 2 (two) times daily.    calcium citrate-vitamin D3 315-200 mg (CITRACAL+D) 315-200 mg-unit per tablet Take 1 tablet by mouth 2 (two) times daily.    cevimeline (EVOXAC) 30 mg capsule Take 1 capsule (30 mg total) by mouth 2 (two) times daily.    diclofenac sodium (VOLTAREN) 1 % Gel Apply 2 g topically 2 (two) times daily.    fluticasone (FLONASE) 50 mcg/actuation nasal spray 1  Spray each nostril Every day    folic acid (FOLVITE) 1 MG tablet Take 1 tablet (1 mg total) by mouth once daily.    hydroxychloroquine (PLAQUENIL) 200 mg tablet TAKE 2 TABLETS ONCE DAILY    meloxicam (MOBIC) 15 MG tablet Take 15 mg by mouth once daily.    methotrexate 2.5 MG Tab Take 4 tablets (10 mg total) by mouth every 7 days.    methylPREDNISolone (MEDROL DOSEPACK) 4 mg tablet  "use as directed    miscellaneous medical supply Pack 1 soft L hand splint for carpal tunnel     No current facility-administered medications for this visit.      Review of patient's allergies indicates:   Allergen Reactions    Doxycycline      Other reaction(s): vomit  Other reaction(s): Hives    Sulfa (sulfonamide antibiotics) Nausea And Vomiting       Visit Date: 3/21/2019  Evaluation Date: 2/26/19  Visit # authorized: 3/20  Authorization period: 2/7/20  Plan of care Expiration: 4/9/19  MD referral: cervical radiculopathy     Time In: 10:12  Time Out: 11:03  Total Billable Time: 30 minutes     Precautions: Standard    Subjective     Pt reports she had a steroid injection in her L shoulder yesterday and is not supposed to weight bear on it or put it in aggravate it. She feels she is making good progress with neck and wants to continue to learn the exercises so she could manage on her own eventually.   Pain Scale: Urmila rates pain at neck/shoulder on a scale of 0-10 to be 8 currently.    Objective     Urmila received individual therapeutic exercises to develop strength, endurance, ROM, flexibility, posture and core stabilization for 51 minutes including:  UBE 2/2 min   Chin tucks 20x   Chin tuck with sb 10x ea NP  Rotations 20x  Median nerve glide 20x  Abd/er isometric- mild pain with abd, instructed to only perform ER if abd causes pain  Pendulums #3 30x CW/CCW/side to side  AAROM shoulder flexion 3x10   Shoulder extension [place] TB 3x10   Ball roll up on wall 2x10 5" hol- 15 today  Ball roll on table CW/CCW soft yellow ball 1/2 [place]   IR/ER walkouts YTB 1x10     Supine serratus punch 2x10 (2# 1 set)  Supine scaption 2x10  Supine HZ abd 2x10 YTB    Urmila received cold pack for 10 minutes to.    Written Home Exercises Provided: at eval  Pt demo good understanding of the education provided. Urmila demonstrated good return demonstration of activities.     Education provided re: cont CHANDNI  Urmila verbalized good " understanding of education provided.   No spiritual or educational barriers to learning provided    Assessment     Patient tolerated treatment fairly without adverse effects. Increased pain after injection, modified exercises for pt tolerance. Pt tolerated addition if IR/ER walkout and ball roll on table. Continue to progress per pt tolerance. This is a 74 y.o. female referred to outpatient physical therapy and presents with a medical diagnosis of cervical radiculopathy and L shoulder pain  and demonstrates limitations as described in the problem list. Pt prognosis is Good. Pt will continue to benefit from skilled outpatient physical therapy to address the deficits listed in the problem list, provide pt/family education and to maximize pt's level of independence in the home and community environment.     Anticipated barriers to physical therapy: None     Goals:   Short Term GOALS: 3 weeks. Pt agrees with goals set.  1. Patient demonstrates independence with HEP.   2. Patient demonstrates independence with Postural Awareness.   3. Patient demonstrates independence with body mechanics.      Long Term GOALS: 6 weeks. Pt agrees with goals set.  1. Patient demonstrates increased cervical ROM by 5 deg to improve tolerance to functional activities.   2. Patient demonstrates increased strength BUE's by 1 MMT grade or greater to improve tolerance to functional activities.   3. Patient demonstrates improved overall function per FOTO Neck Survey to 34% or less.      Plan     Continue with established Plan of Care towards PT goals.    Therapist: Marium Valenzuela, PT, DPT  3/22/2019

## 2019-03-25 ENCOUNTER — CLINICAL SUPPORT (OUTPATIENT)
Dept: REHABILITATION | Facility: HOSPITAL | Age: 74
End: 2019-03-25
Payer: MEDICARE

## 2019-03-25 DIAGNOSIS — M54.12 CERVICAL RADICULOPATHY: Primary | ICD-10-CM

## 2019-03-25 DIAGNOSIS — R29.3 POSTURE IMBALANCE: ICD-10-CM

## 2019-03-25 PROCEDURE — 97110 THERAPEUTIC EXERCISES: CPT | Mod: HCNC

## 2019-03-25 NOTE — PROGRESS NOTES
Physical Therapy Daily Note       Name: Urmila DAN [facility] Number: [id number]  Diagnosis:   Encounter Diagnoses   Name Primary?    Cervical radiculopathy Yes    Posture imbalance      Physician: Ana Li, *  Treatment Orders: PT Eval and Treat  Past Medical History:   Diagnosis Date    Arthritis     Asymptomatic PVCs     Benign liver cyst 09/26/2012    Fibrocystic breast 1995    left     Fibrocystic breast 1995    left    Fibrocystic breast 1997    right    Joint pain     Kidney stones 07/20/2012    NS (nuclear sclerosis) 7/19/2013    Osteopenia     Raynaud's disease     Rheumatoid arthritis(714.0)     Sjogren's disease      Current Outpatient Medications   Medication Sig    alendronate (FOSAMAX) 70 MG tablet TAKE 1 TABLET (70 MG TOTAL) BY MOUTH EVERY 7 DAYS.    ascorbic acid (VITAMIN C) 500 MG tablet Take 500 mg by mouth once daily.    Aspirin Child 81 mg Chew Take by mouth. 1 Tablet, Chewable Oral 4x times weekly    atorvastatin (LIPITOR) 20 MG tablet Take 1 tablet (20 mg total) by mouth once daily.    baclofen (LIORESAL) 10 MG tablet Take 1 tablet (10 mg total) by mouth 2 (two) times daily.    calcium citrate-vitamin D3 315-200 mg (CITRACAL+D) 315-200 mg-unit per tablet Take 1 tablet by mouth 2 (two) times daily.    cevimeline (EVOXAC) 30 mg capsule Take 1 capsule (30 mg total) by mouth 2 (two) times daily.    diclofenac sodium (VOLTAREN) 1 % Gel Apply 2 g topically 2 (two) times daily.    fluticasone (FLONASE) 50 mcg/actuation nasal spray 1  Spray each nostril Every day    folic acid (FOLVITE) 1 MG tablet Take 1 tablet (1 mg total) by mouth once daily.    hydroxychloroquine (PLAQUENIL) 200 mg tablet TAKE 2 TABLETS ONCE DAILY    meloxicam (MOBIC) 15 MG tablet Take 15 mg by mouth once daily.    methotrexate 2.5 MG Tab Take 4 tablets (10 mg total) by mouth every 7 days.    methylPREDNISolone (MEDROL DOSEPACK) 4 mg tablet  "use as directed    miscellaneous medical supply Pack 1 soft L hand splint for carpal tunnel     No current facility-administered medications for this visit.      Review of patient's allergies indicates:   Allergen Reactions    Doxycycline      Other reaction(s): vomit  Other reaction(s): Hives    Sulfa (sulfonamide antibiotics) Nausea And Vomiting       Visit Date: 3/21/2019  Evaluation Date: 2/26/19  Visit # authorized: 4/20  Authorization period: 2/7/20  Plan of care Expiration: 4/9/19  MD referral: cervical radiculopathy     Time In: 10:16  Time Out: 11:16  Total Billable Time: 40 minutes     Precautions: Standard    Subjective     Pt reports feeling good since injection  Pain Scale: Urmila rates pain at neck/shoulder on a scale of 0-10 to be 0 currently.    Objective     Urmila received individual therapeutic exercises to develop strength, endurance, ROM, flexibility, posture and core stabilization for 54 minutes including:  UBE 2/2 min   Chin tucks 20x   Chin tuck with sb 10x ea NP  Rotations 20x  Median nerve glide 20x  Abd/er isometric- mild pain with abd, instructed to only perform ER if abd causes pain-NT  Pendulums #3 30x CW/CCW/side to side-NT  AAROM shoulder flexion 3x10   Shoulder extension [place] TB 3x10   Rows [place] 2x10  Ball roll up on wall 2x10 5" hol- 15 today  Ball roll on table CW/CCW soft yellow ball 1/2 [place]   IR/ER walkouts YTB 1x10     Supine serratus punch 2x10 (2# 1 set) no wt today  Supine scaption 2x10  Supine HZ abd 2x10 YTB    Urmila received cold pack for 10 minutes to.    Written Home Exercises Provided: at eval  Pt demo good understanding of the education provided. Urmila demonstrated good return demonstration of activities.     Education provided re: cont HEP  Urmila verbalized good understanding of education provided.   No spiritual or educational barriers to learning provided    Assessment     Pt go dizzy during session. BP taken supine 141/91. Pt placed supine w/ LEs " elevated for 5 min.  Bp decreased to120/86. Able to add rows today w/o pain. Progress pt for shoulder strength as mayur next session. This is a 74 y.o. female referred to outpatient physical therapy and presents with a medical diagnosis of cervical radiculopathy and L shoulder pain  and demonstrates limitations as described in the problem list. Pt prognosis is Good. Pt will continue to benefit from skilled outpatient physical therapy to address the deficits listed in the problem list, provide pt/family education and to maximize pt's level of independence in the home and community environment.     Anticipated barriers to physical therapy: None     Goals:   Short Term GOALS: 3 weeks. Pt agrees with goals set.  1. Patient demonstrates independence with HEP.   2. Patient demonstrates independence with Postural Awareness.   3. Patient demonstrates independence with body mechanics.      Long Term GOALS: 6 weeks. Pt agrees with goals set.  1. Patient demonstrates increased cervical ROM by 5 deg to improve tolerance to functional activities.   2. Patient demonstrates increased strength BUE's by 1 MMT grade or greater to improve tolerance to functional activities.   3. Patient demonstrates improved overall function per FOTO Neck Survey to 34% or less.      Plan     Continue with established Plan of Care towards PT goals.    Therapist: Gertrude Bloom PTA, DPT  3/25/2019

## 2019-03-27 ENCOUNTER — PATIENT MESSAGE (OUTPATIENT)
Dept: INTERNAL MEDICINE | Facility: CLINIC | Age: 74
End: 2019-03-27

## 2019-03-27 ENCOUNTER — PATIENT MESSAGE (OUTPATIENT)
Dept: RHEUMATOLOGY | Facility: CLINIC | Age: 74
End: 2019-03-27

## 2019-03-27 LAB
VARICELLA INTERPRETATION: POSITIVE
VARICELLA ZOSTER IGG: 3.11 ISR (ref 0–0.9)

## 2019-03-28 ENCOUNTER — PATIENT MESSAGE (OUTPATIENT)
Dept: INTERNAL MEDICINE | Facility: CLINIC | Age: 74
End: 2019-03-28

## 2019-03-29 ENCOUNTER — TELEPHONE (OUTPATIENT)
Dept: INTERNAL MEDICINE | Facility: CLINIC | Age: 74
End: 2019-03-29

## 2019-03-29 ENCOUNTER — LAB VISIT (OUTPATIENT)
Dept: LAB | Facility: HOSPITAL | Age: 74
End: 2019-03-29
Attending: INTERNAL MEDICINE
Payer: MEDICARE

## 2019-03-29 DIAGNOSIS — M35.00 SJOGREN'S SYNDROME, WITH UNSPECIFIED ORGAN INVOLVEMENT: ICD-10-CM

## 2019-03-29 DIAGNOSIS — M06.9 RHEUMATOID ARTHRITIS INVOLVING MULTIPLE SITES, UNSPECIFIED RHEUMATOID FACTOR PRESENCE: ICD-10-CM

## 2019-03-29 DIAGNOSIS — M54.12 CERVICAL RADICULOPATHY: ICD-10-CM

## 2019-03-29 DIAGNOSIS — G56.00 CARPAL TUNNEL SYNDROME, UNSPECIFIED LATERALITY: ICD-10-CM

## 2019-03-29 DIAGNOSIS — M15.9 PRIMARY OSTEOARTHRITIS INVOLVING MULTIPLE JOINTS: ICD-10-CM

## 2019-03-29 DIAGNOSIS — M85.80 OSTEOPENIA DETERMINED BY X-RAY: ICD-10-CM

## 2019-03-29 LAB
ALBUMIN SERPL BCP-MCNC: 3.8 G/DL (ref 3.5–5.2)
ALP SERPL-CCNC: 57 U/L (ref 55–135)
ALT SERPL W/O P-5'-P-CCNC: 27 U/L (ref 10–44)
ANION GAP SERPL CALC-SCNC: 7 MMOL/L (ref 8–16)
AST SERPL-CCNC: 24 U/L (ref 10–40)
BASOPHILS # BLD AUTO: 0.05 K/UL (ref 0–0.2)
BASOPHILS NFR BLD: 0.4 % (ref 0–1.9)
BILIRUB SERPL-MCNC: 1.1 MG/DL (ref 0.1–1)
BUN SERPL-MCNC: 19 MG/DL (ref 8–23)
CALCIUM SERPL-MCNC: 9.8 MG/DL (ref 8.7–10.5)
CHLORIDE SERPL-SCNC: 104 MMOL/L (ref 95–110)
CO2 SERPL-SCNC: 28 MMOL/L (ref 23–29)
CREAT SERPL-MCNC: 1.2 MG/DL (ref 0.5–1.4)
CRP SERPL-MCNC: 1.1 MG/L (ref 0–8.2)
DIFFERENTIAL METHOD: ABNORMAL
EOSINOPHIL # BLD AUTO: 0 K/UL (ref 0–0.5)
EOSINOPHIL NFR BLD: 0.3 % (ref 0–8)
ERYTHROCYTE [DISTWIDTH] IN BLOOD BY AUTOMATED COUNT: 12.3 % (ref 11.5–14.5)
ERYTHROCYTE [SEDIMENTATION RATE] IN BLOOD BY WESTERGREN METHOD: 14 MM/HR (ref 0–36)
EST. GFR  (AFRICAN AMERICAN): 52 ML/MIN/1.73 M^2
EST. GFR  (NON AFRICAN AMERICAN): 45 ML/MIN/1.73 M^2
GLUCOSE SERPL-MCNC: 110 MG/DL (ref 70–110)
HCT VFR BLD AUTO: 41.9 % (ref 37–48.5)
HGB BLD-MCNC: 14.1 G/DL (ref 12–16)
LYMPHOCYTES # BLD AUTO: 2.3 K/UL (ref 1–4.8)
LYMPHOCYTES NFR BLD: 16.6 % (ref 18–48)
MCH RBC QN AUTO: 30.8 PG (ref 27–31)
MCHC RBC AUTO-ENTMCNC: 33.7 G/DL (ref 32–36)
MCV RBC AUTO: 92 FL (ref 82–98)
MONOCYTES # BLD AUTO: 1 K/UL (ref 0.3–1)
MONOCYTES NFR BLD: 7.5 % (ref 4–15)
NEUTROPHILS # BLD AUTO: 10.4 K/UL (ref 1.8–7.7)
NEUTROPHILS NFR BLD: 74.8 % (ref 38–73)
PLATELET # BLD AUTO: 284 K/UL (ref 150–350)
PMV BLD AUTO: 10.2 FL (ref 9.2–12.9)
POTASSIUM SERPL-SCNC: 4.4 MMOL/L (ref 3.5–5.1)
PROT SERPL-MCNC: 7 G/DL (ref 6–8.4)
RBC # BLD AUTO: 4.58 M/UL (ref 4–5.4)
SODIUM SERPL-SCNC: 139 MMOL/L (ref 136–145)
WBC # BLD AUTO: 13.91 K/UL (ref 3.9–12.7)

## 2019-03-29 PROCEDURE — 86140 C-REACTIVE PROTEIN: CPT | Mod: HCNC

## 2019-03-29 PROCEDURE — 36415 COLL VENOUS BLD VENIPUNCTURE: CPT | Mod: HCNC

## 2019-03-29 PROCEDURE — 80053 COMPREHEN METABOLIC PANEL: CPT | Mod: HCNC

## 2019-03-29 PROCEDURE — 85025 COMPLETE CBC W/AUTO DIFF WBC: CPT | Mod: HCNC

## 2019-03-29 PROCEDURE — 85652 RBC SED RATE AUTOMATED: CPT | Mod: HCNC

## 2019-03-29 NOTE — TELEPHONE ENCOUNTER
----- Message from Nydia Locke sent at 3/29/2019  9:18 AM CDT -----  Contact: Patient [phone]  Pt states that she is calling to speak with someone in regards to getting an appt to see . She states that she was seen in rheumatology and was advised that she needed to be seen by her PCP as soon as possible. She also states that she needs to have CBC and CMP labs drawn. Please call back and advise.      Thanks

## 2019-04-01 ENCOUNTER — PATIENT MESSAGE (OUTPATIENT)
Dept: INTERNAL MEDICINE | Facility: CLINIC | Age: 74
End: 2019-04-01

## 2019-04-02 ENCOUNTER — OFFICE VISIT (OUTPATIENT)
Dept: INTERNAL MEDICINE | Facility: CLINIC | Age: 74
End: 2019-04-02
Payer: MEDICARE

## 2019-04-02 VITALS
SYSTOLIC BLOOD PRESSURE: 120 MMHG | HEIGHT: 65 IN | BODY MASS INDEX: 29.82 KG/M2 | DIASTOLIC BLOOD PRESSURE: 75 MMHG | HEART RATE: 70 BPM | OXYGEN SATURATION: 97 % | WEIGHT: 179 LBS

## 2019-04-02 DIAGNOSIS — H53.8 BLURRY VISION: ICD-10-CM

## 2019-04-02 DIAGNOSIS — D72.829 LEUKOCYTOSIS, UNSPECIFIED TYPE: ICD-10-CM

## 2019-04-02 DIAGNOSIS — R53.83 FATIGUE, UNSPECIFIED TYPE: Primary | ICD-10-CM

## 2019-04-02 DIAGNOSIS — D52.1 DRUG-INDUCED FOLATE DEFICIENCY ANEMIA: ICD-10-CM

## 2019-04-02 DIAGNOSIS — Z86.39 PERSONAL HISTORY OF OTHER ENDOCRINE, NUTRITIONAL AND METABOLIC DISEASE: ICD-10-CM

## 2019-04-02 PROCEDURE — 99214 OFFICE O/P EST MOD 30 MIN: CPT | Mod: HCNC,GC,S$GLB, | Performed by: STUDENT IN AN ORGANIZED HEALTH CARE EDUCATION/TRAINING PROGRAM

## 2019-04-02 PROCEDURE — 1101F PR PT FALLS ASSESS DOC 0-1 FALLS W/OUT INJ PAST YR: ICD-10-PCS | Mod: HCNC,CPTII,GC,S$GLB | Performed by: STUDENT IN AN ORGANIZED HEALTH CARE EDUCATION/TRAINING PROGRAM

## 2019-04-02 PROCEDURE — 3078F DIAST BP <80 MM HG: CPT | Mod: HCNC,CPTII,GC,S$GLB | Performed by: STUDENT IN AN ORGANIZED HEALTH CARE EDUCATION/TRAINING PROGRAM

## 2019-04-02 PROCEDURE — 3074F PR MOST RECENT SYSTOLIC BLOOD PRESSURE < 130 MM HG: ICD-10-PCS | Mod: HCNC,CPTII,GC,S$GLB | Performed by: STUDENT IN AN ORGANIZED HEALTH CARE EDUCATION/TRAINING PROGRAM

## 2019-04-02 PROCEDURE — 99999 PR PBB SHADOW E&M-EST. PATIENT-LVL V: ICD-10-PCS | Mod: PBBFAC,HCNC,GC, | Performed by: STUDENT IN AN ORGANIZED HEALTH CARE EDUCATION/TRAINING PROGRAM

## 2019-04-02 PROCEDURE — 99214 PR OFFICE/OUTPT VISIT, EST, LEVL IV, 30-39 MIN: ICD-10-PCS | Mod: HCNC,GC,S$GLB, | Performed by: STUDENT IN AN ORGANIZED HEALTH CARE EDUCATION/TRAINING PROGRAM

## 2019-04-02 PROCEDURE — 3074F SYST BP LT 130 MM HG: CPT | Mod: HCNC,CPTII,GC,S$GLB | Performed by: STUDENT IN AN ORGANIZED HEALTH CARE EDUCATION/TRAINING PROGRAM

## 2019-04-02 PROCEDURE — 99999 PR PBB SHADOW E&M-EST. PATIENT-LVL V: CPT | Mod: PBBFAC,HCNC,GC, | Performed by: STUDENT IN AN ORGANIZED HEALTH CARE EDUCATION/TRAINING PROGRAM

## 2019-04-02 PROCEDURE — 1101F PT FALLS ASSESS-DOCD LE1/YR: CPT | Mod: HCNC,CPTII,GC,S$GLB | Performed by: STUDENT IN AN ORGANIZED HEALTH CARE EDUCATION/TRAINING PROGRAM

## 2019-04-02 PROCEDURE — 3078F PR MOST RECENT DIASTOLIC BLOOD PRESSURE < 80 MM HG: ICD-10-PCS | Mod: HCNC,CPTII,GC,S$GLB | Performed by: STUDENT IN AN ORGANIZED HEALTH CARE EDUCATION/TRAINING PROGRAM

## 2019-04-02 NOTE — TELEPHONE ENCOUNTER
Spoke with pt she stated she does not have a sore throat--denies having a fever or any other signs of infection besides her WBC being 13.91 L/uL--she states that she still only loosing her voice everyday and is still very weak. States that she looks very pale and her pupils are constricted. I schedule her with appt today at 2pm in the resident clinic with Ro Long MD.

## 2019-04-08 ENCOUNTER — CLINICAL SUPPORT (OUTPATIENT)
Dept: REHABILITATION | Facility: HOSPITAL | Age: 74
End: 2019-04-08
Payer: MEDICARE

## 2019-04-08 DIAGNOSIS — R29.3 POSTURE IMBALANCE: ICD-10-CM

## 2019-04-08 DIAGNOSIS — M54.12 CERVICAL RADICULOPATHY: Primary | ICD-10-CM

## 2019-04-08 PROCEDURE — 97110 THERAPEUTIC EXERCISES: CPT | Mod: HCNC

## 2019-04-08 NOTE — PROGRESS NOTES
Physical Therapy Daily Note       Name: Urmila Martínez  [facility] Number: [id number]  Diagnosis:   No diagnosis found.  Physician: Ana Li, *  Treatment Orders: PT Eval and Treat  Past Medical History:   Diagnosis Date    Arthritis     Asymptomatic PVCs     Benign liver cyst 09/26/2012    Fibrocystic breast 1995    left     Fibrocystic breast 1995    left    Fibrocystic breast 1997    right    Joint pain     Kidney stones 07/20/2012    NS (nuclear sclerosis) 7/19/2013    Osteopenia     Raynaud's disease     Rheumatoid arthritis(714.0)     Sjogren's disease      Current Outpatient Medications   Medication Sig    alendronate (FOSAMAX) 70 MG tablet TAKE 1 TABLET (70 MG TOTAL) BY MOUTH EVERY 7 DAYS.    ascorbic acid (VITAMIN C) 500 MG tablet Take 500 mg by mouth once daily.    Aspirin Child 81 mg Chew Take by mouth. 1 Tablet, Chewable Oral 4x times weekly    atorvastatin (LIPITOR) 20 MG tablet Take 1 tablet (20 mg total) by mouth once daily.    baclofen (LIORESAL) 10 MG tablet Take 1 tablet (10 mg total) by mouth 2 (two) times daily.    calcium citrate-vitamin D3 315-200 mg (CITRACAL+D) 315-200 mg-unit per tablet Take 1 tablet by mouth 2 (two) times daily.    cevimeline (EVOXAC) 30 mg capsule Take 1 capsule (30 mg total) by mouth 2 (two) times daily.    diclofenac sodium (VOLTAREN) 1 % Gel Apply 2 g topically 2 (two) times daily.    fluticasone (FLONASE) 50 mcg/actuation nasal spray 1  Spray each nostril Every day    folic acid (FOLVITE) 1 MG tablet Take 1 tablet (1 mg total) by mouth once daily.    hydroxychloroquine (PLAQUENIL) 200 mg tablet TAKE 2 TABLETS ONCE DAILY    meloxicam (MOBIC) 15 MG tablet Take 15 mg by mouth once daily.    methotrexate 2.5 MG Tab Take 4 tablets (10 mg total) by mouth every 7 days.    miscellaneous medical supply Pack 1 soft L hand splint for carpal tunnel     No current facility-administered medications  for this visit.      Review of patient's allergies indicates:   Allergen Reactions    Doxycycline      Other reaction(s): vomit  Other reaction(s): Hives    Sulfa (sulfonamide antibiotics) Nausea And Vomiting       Visit Date: 3/21/2019  Evaluation Date: 2/26/19  Visit # authorized: 5/20  Authorization period: 2/7/20  Plan of care Expiration: 4/9/19  MD referral: cervical radiculopathy     Time In: 9:50  Time Out: 10:52  Total Billable Time: 48 minutes     Precautions: Standard    Subjective     Pt reports no pain but still having side effects from meds. Going to MD tomorrow to get blood work  Pain Scale: Urmila rates pain at neck/shoulder on a scale of 0-10 to be 0 currently.    Objective     Urmila received individual therapeutic exercises to develop strength, endurance, ROM, flexibility, posture and core stabilization for 52 minutes including:  UBE 2/2 min   Chin tuck with sb 10x ea NP  Rotations 20x  Median nerve glide 20x-NT  Seated scaption 2x10   Shoulder extension  OTB 3x10   Rows OTB 2x10  Ball roll on table CW/CCW soft yellow ball 1/2 [place]  20x  Seated B ER AROM OTB 3x10  Seated B shoulder FL w/ yellow med ball 2x10  Stars on wall YTB 1x5  Chin tucks 20x  Supine serratus punch 3x10 #2  Supine HZ abd 2x10 YTB  SL shoulder ER,FL, HZ ABD 2x15 (w/ scap stab)    Urmila received cold pack for 10 minutes to.    Written Home Exercises Provided: at eval  Pt demo good understanding of the education provided. Urmila demonstrated good return demonstration of activities.     Education provided re: cont HEP  Urmila verbalized good understanding of education provided.   No spiritual or educational barriers to learning provided    Assessment     Pt mayur zayra well w/o adverse reaction. Rest brakes b/w each standing ex to prevent dizzy symptoms. R UT symptoms w/ seated ball FL, next time try less wt and make sure UT do not activate w/ FL AROM. L wrist symptoms after stars on wall. Try not ext wrist next session. Pt  progressing well towards therapy goals. This is a 74 y.o. female referred to outpatient physical therapy and presents with a medical diagnosis of cervical radiculopathy and L shoulder pain  and demonstrates limitations as described in the problem list. Pt prognosis is Good. Pt will continue to benefit from skilled outpatient physical therapy to address the deficits listed in the problem list, provide pt/family education and to maximize pt's level of independence in the home and community environment.     Anticipated barriers to physical therapy: None     Goals:   Short Term GOALS: 3 weeks. Pt agrees with goals set.  1. Patient demonstrates independence with HEP.   2. Patient demonstrates independence with Postural Awareness.   3. Patient demonstrates independence with body mechanics.      Long Term GOALS: 6 weeks. Pt agrees with goals set.  1. Patient demonstrates increased cervical ROM by 5 deg to improve tolerance to functional activities.   2. Patient demonstrates increased strength BUE's by 1 MMT grade or greater to improve tolerance to functional activities.   3. Patient demonstrates improved overall function per FOTO Neck Survey to 34% or less.      Plan     Continue with established Plan of Care towards PT goals.    Therapist: Gertrude Bloom PTA,   4/8/2019

## 2019-04-09 ENCOUNTER — LAB VISIT (OUTPATIENT)
Dept: LAB | Facility: HOSPITAL | Age: 74
End: 2019-04-09
Attending: INTERNAL MEDICINE
Payer: MEDICARE

## 2019-04-09 DIAGNOSIS — D72.829 LEUKOCYTOSIS, UNSPECIFIED TYPE: ICD-10-CM

## 2019-04-09 DIAGNOSIS — Z86.39 PERSONAL HISTORY OF OTHER ENDOCRINE, NUTRITIONAL AND METABOLIC DISEASE: ICD-10-CM

## 2019-04-09 DIAGNOSIS — R53.83 FATIGUE, UNSPECIFIED TYPE: ICD-10-CM

## 2019-04-09 DIAGNOSIS — D52.1 DRUG-INDUCED FOLATE DEFICIENCY ANEMIA: ICD-10-CM

## 2019-04-09 LAB
ALBUMIN SERPL BCP-MCNC: 3.9 G/DL (ref 3.5–5.2)
ALP SERPL-CCNC: 54 U/L (ref 55–135)
ALT SERPL W/O P-5'-P-CCNC: 13 U/L (ref 10–44)
ANION GAP SERPL CALC-SCNC: 6 MMOL/L (ref 8–16)
AST SERPL-CCNC: 20 U/L (ref 10–40)
BASOPHILS # BLD AUTO: 0.08 K/UL (ref 0–0.2)
BASOPHILS NFR BLD: 1.5 % (ref 0–1.9)
BILIRUB SERPL-MCNC: 1.4 MG/DL (ref 0.1–1)
BUN SERPL-MCNC: 16 MG/DL (ref 8–23)
CALCIUM SERPL-MCNC: 9.5 MG/DL (ref 8.7–10.5)
CHLORIDE SERPL-SCNC: 106 MMOL/L (ref 95–110)
CO2 SERPL-SCNC: 28 MMOL/L (ref 23–29)
CREAT SERPL-MCNC: 0.9 MG/DL (ref 0.5–1.4)
DIFFERENTIAL METHOD: NORMAL
EOSINOPHIL # BLD AUTO: 0 K/UL (ref 0–0.5)
EOSINOPHIL NFR BLD: 0.2 % (ref 0–8)
ERYTHROCYTE [DISTWIDTH] IN BLOOD BY AUTOMATED COUNT: 12.2 % (ref 11.5–14.5)
EST. GFR  (AFRICAN AMERICAN): >60 ML/MIN/1.73 M^2
EST. GFR  (NON AFRICAN AMERICAN): >60 ML/MIN/1.73 M^2
FOLATE SERPL-MCNC: 16.2 NG/ML (ref 4–24)
GLUCOSE SERPL-MCNC: 85 MG/DL (ref 70–110)
HCT VFR BLD AUTO: 38.9 % (ref 37–48.5)
HGB BLD-MCNC: 13 G/DL (ref 12–16)
LYMPHOCYTES # BLD AUTO: 1.6 K/UL (ref 1–4.8)
LYMPHOCYTES NFR BLD: 30.4 % (ref 18–48)
MCH RBC QN AUTO: 31 PG (ref 27–31)
MCHC RBC AUTO-ENTMCNC: 33.4 G/DL (ref 32–36)
MCV RBC AUTO: 93 FL (ref 82–98)
MONOCYTES # BLD AUTO: 0.5 K/UL (ref 0.3–1)
MONOCYTES NFR BLD: 10 % (ref 4–15)
NEUTROPHILS # BLD AUTO: 3.1 K/UL (ref 1.8–7.7)
NEUTROPHILS NFR BLD: 57.7 % (ref 38–73)
PLATELET # BLD AUTO: 192 K/UL (ref 150–350)
PMV BLD AUTO: 10.8 FL (ref 9.2–12.9)
POTASSIUM SERPL-SCNC: 3.5 MMOL/L (ref 3.5–5.1)
PROT SERPL-MCNC: 6.9 G/DL (ref 6–8.4)
RBC # BLD AUTO: 4.2 M/UL (ref 4–5.4)
SODIUM SERPL-SCNC: 140 MMOL/L (ref 136–145)
TSH SERPL DL<=0.005 MIU/L-ACNC: 1.69 UIU/ML (ref 0.4–4)
VIT B12 SERPL-MCNC: 541 PG/ML (ref 210–950)
WBC # BLD AUTO: 5.29 K/UL (ref 3.9–12.7)

## 2019-04-09 PROCEDURE — 82746 ASSAY OF FOLIC ACID SERUM: CPT | Mod: HCNC

## 2019-04-09 PROCEDURE — 85025 COMPLETE CBC W/AUTO DIFF WBC: CPT | Mod: HCNC

## 2019-04-09 PROCEDURE — 82607 VITAMIN B-12: CPT | Mod: HCNC

## 2019-04-09 PROCEDURE — 80053 COMPREHEN METABOLIC PANEL: CPT | Mod: HCNC

## 2019-04-09 PROCEDURE — 84443 ASSAY THYROID STIM HORMONE: CPT | Mod: HCNC

## 2019-04-09 PROCEDURE — 36415 COLL VENOUS BLD VENIPUNCTURE: CPT | Mod: HCNC

## 2019-04-10 ENCOUNTER — PATIENT MESSAGE (OUTPATIENT)
Dept: INTERNAL MEDICINE | Facility: CLINIC | Age: 74
End: 2019-04-10

## 2019-04-10 ENCOUNTER — TELEPHONE (OUTPATIENT)
Dept: RHEUMATOLOGY | Facility: CLINIC | Age: 74
End: 2019-04-10

## 2019-04-10 ENCOUNTER — HOSPITAL ENCOUNTER (OUTPATIENT)
Dept: RADIOLOGY | Facility: HOSPITAL | Age: 74
Discharge: HOME | End: 2019-04-10
Attending: STUDENT IN AN ORGANIZED HEALTH CARE EDUCATION/TRAINING PROGRAM
Payer: MEDICARE

## 2019-04-10 ENCOUNTER — PATIENT MESSAGE (OUTPATIENT)
Dept: RHEUMATOLOGY | Facility: CLINIC | Age: 74
End: 2019-04-10

## 2019-04-10 DIAGNOSIS — M06.9 RHEUMATOID ARTHRITIS, INVOLVING UNSPECIFIED SITE, UNSPECIFIED RHEUMATOID FACTOR PRESENCE: ICD-10-CM

## 2019-04-10 DIAGNOSIS — R06.02 SOB (SHORTNESS OF BREATH): Primary | ICD-10-CM

## 2019-04-10 DIAGNOSIS — R06.02 SOB (SHORTNESS OF BREATH): ICD-10-CM

## 2019-04-10 PROCEDURE — 71046 XR CHEST PA AND LATERAL: ICD-10-PCS | Mod: 26,HCNC,, | Performed by: RADIOLOGY

## 2019-04-10 PROCEDURE — 71046 X-RAY EXAM CHEST 2 VIEWS: CPT | Mod: TC,HCNC,FY

## 2019-04-10 PROCEDURE — 71046 X-RAY EXAM CHEST 2 VIEWS: CPT | Mod: 26,HCNC,, | Performed by: RADIOLOGY

## 2019-04-10 NOTE — PROGRESS NOTES
"                                                     RHEUMATOLOGY CLINIC FOLLOW UP VISIT  Chief complaints:-Shortness of breath      HPI:-  rUmila House a 74 y.o.  pleasant female with a history of primary Sjogren's syndrome, inflammatory arthritis   (seropositive RA) and Raynaud's phenomena present to the clinic today for L hand numbness that started since mid Nov.      RA controlled and is currently on plaquenil for many years.  Last exam was June 2018.  Noticed a flare of multiple joints a few weeks ago.  Usually resolves in 3 days with NSAIDS.  She did that and all other joints arthralgia improved except for LUE (elbow, wrist, and hand).  Patient continues to feel joint pain and neuropathy.  She feels like numb sensation of the finger tips and it varies which fingers.  Associated symptoms includes weakness - have not drop anything yet.  Symptoms worsen when she wakes up and has to be "massaged" out.  Previously worked in IT - typed a lot.  Went to see PCP - tried a hard/metal splint which worsen the symptoms.  She used it for just 10 days.  Stopped fosmax because of the pain.  Currently complaining of L shoulder discomfort and limited ROM secondary to pain.  Sleeps on her side.  Denies any trauma/falls.       Sjogren controlled.  Does not require eye drops or mouth lubricant.  Using cevimeline daily     Raynaud's controlled with gloves when it's under 60 degrees.      Osteoporosis on fosmax    Patient completed PT/OT for LUE and bilateral carpal tunnel.  Wearing bilateral hand brace in the PM - neuropathy symptoms resolve.     EMG done in Feb showed mild L carpal tunnel.       Interval History    MTX 10mg started since last clinic visit (March 21).  Taken 2 doses of MTX since but held because of voice change, decreased appetite, SOB/PULIDO.  Patient states that she had been feeling "unwell" since last clinic visit.  Completed steroid already - no joint pain.  Patient feels slightly better but continues to have " decreased appetite and SOB/PULIDO.  Denies any recent sick contact, fever/chills, abdominal pain, CP, diarrhea/constipation.       Repeat labs (including inflammatory markers) are unremarkable.  CXR - no consolidation seen.        Review of Systems   Constitutional: Positive for malaise/fatigue and weight loss. Negative for chills, diaphoresis and fever.   HENT: Negative for congestion, ear discharge, ear pain, hearing loss, nosebleeds, sinus pain and tinnitus.    Eyes: Negative for photophobia, pain, discharge and redness.   Respiratory: Positive for shortness of breath. Negative for cough, hemoptysis, sputum production, wheezing and stridor.    Cardiovascular: Negative for chest pain, palpitations, orthopnea, claudication, leg swelling and PND.   Gastrointestinal: Negative for abdominal pain, constipation, diarrhea, heartburn, nausea and vomiting.   Genitourinary: Negative for dysuria, frequency, hematuria and urgency.   Musculoskeletal: Positive for joint pain. Negative for back pain, myalgias and neck pain.   Skin: Negative for rash.   Neurological: Negative for dizziness, tingling, tremors, weakness and headaches.   Endo/Heme/Allergies: Does not bruise/bleed easily.   Psychiatric/Behavioral: Negative for depression, hallucinations and suicidal ideas. The patient is not nervous/anxious and does not have insomnia.        Past Medical History:   Diagnosis Date    Arthritis     Asymptomatic PVCs     Benign liver cyst 09/26/2012    Fibrocystic breast 1995    left     Fibrocystic breast 1995    left    Fibrocystic breast 1997    right    Joint pain     Kidney stones 07/20/2012    NS (nuclear sclerosis) 7/19/2013    Osteopenia     Raynaud's disease     Rheumatoid arthritis(714.0)     Sjogren's disease        Past Surgical History:   Procedure Laterality Date    BREAST BIOPSY Bilateral     2 times - core needle right breast, biopsy left breast: fibrocystic findings    CHOLECYSTECTOMY  1990's      "COLONOSCOPY N/A 10/20/2015    Performed by [facility]ARR[facility]cd[facility]nell [facility] at [facility] ([facility])    CYST REMOVAL      right ankle - benign cyst removed at 7yrs old     CYST REMOVAL  about 2010    cyst removed from forehead     TONSILLECTOMY, ADENOIDECTOMY      during childhood         Social History     Tobacco Use    Smoking status: Never Smoker    Smokeless tobacco: Never Used   Substance Use Topics    Alcohol use: Yes     Frequency: Monthly or less     Drinks per session: Patient refused     Binge frequency: Never     Comment: limited- glass a wine a few times a year    Drug use: No       Family History   Problem Relation Age of Onset    Liver cancer Mother         liver and colon    Cancer Mother         colon, liver    Heart disease Mother         CABG    Colon cancer Father     Lung cancer Father         thinks colon was first    Cancer Father         colon, lung    Heart disease Brother         multiple bypass    Diabetes Brother     Allergies Daughter     Asthma Daughter     Asthma Grandchild     Cancer Maternal Aunt         stomach    Cancer Maternal Grandmother         breast    Breast cancer Maternal Grandmother     Heart disease Maternal Grandfather     COPD Paternal Grandfather     Cancer Maternal Aunt         thyroid    Fabry's disease Cousin     Amblyopia Neg Hx     Blindness Neg Hx     Cataracts Neg Hx     Glaucoma Neg Hx     Hypertension Neg Hx     Macular degeneration Neg Hx     Retinal detachment Neg Hx     Strabismus Neg Hx     Stroke Neg Hx     Thyroid disease Neg Hx     Melanoma Neg Hx        Review of patient's allergies indicates:   Allergen Reactions    Doxycycline      Other reaction(s): vomit  Other reaction(s): Hives    Sulfa (sulfonamide antibiotics) Nausea And Vomiting           Physical examination:-    Vitals:    04/11/19 0810   BP: 104/61   Pulse: 83   Weight: 80.9 kg (178 lb 5.6 oz)   Height: 5' 5" (1.651 m)   PainSc: 0-No pain       Physical Exam "   Constitutional: She is oriented to person, place, and time and well-developed, well-nourished, and in no distress.   HENT:   Head: Normocephalic and atraumatic.   No redness/erythema of the throat  No exudate in pharynx   Eyes: Pupils are equal, round, and reactive to light. Conjunctivae are normal.   Neck: Normal range of motion. Neck supple.   Cardiovascular: Normal rate, regular rhythm and normal heart sounds.   Pulmonary/Chest: Effort normal and breath sounds normal. She exhibits no tenderness.   Abdominal: Soft. Bowel sounds are normal. She exhibits no distension. There is no tenderness.   Musculoskeletal: She exhibits no edema, tenderness or deformity.   Strength and ROM intact  No signs of synovitis    Neurological: She is alert and oriented to person, place, and time. Gait normal.   Skin: Skin is warm and dry.   Psychiatric: Mood, memory, affect and judgment normal.       Radiographs:-  Independent visualization of images done.   Xray fingers - unchanged osseous density at the base of the 1st distal phalanx  Xray hand - obliquely oriented linear lucency along the proximal phalanx of the thumb  Shoulder xray - mild DJD of the AC joint. No fx.     Medication List with Changes/Refills   Current Medications    ALENDRONATE (FOSAMAX) 70 MG TABLET    TAKE 1 TABLET (70 MG TOTAL) BY MOUTH EVERY 7 DAYS.    ASCORBIC ACID (VITAMIN C) 500 MG TABLET    Take 500 mg by mouth once daily.    ASPIRIN CHILD 81 MG CHEW    Take by mouth. 1 Tablet, Chewable Oral 4x times weekly    ATORVASTATIN (LIPITOR) 20 MG TABLET    Take 1 tablet (20 mg total) by mouth once daily.    BACLOFEN (LIORESAL) 10 MG TABLET    Take 1 tablet (10 mg total) by mouth 2 (two) times daily.    CALCIUM CITRATE-VITAMIN D3 315-200 MG (CITRACAL+D) 315-200 MG-UNIT PER TABLET    Take 1 tablet by mouth 2 (two) times daily.    CEVIMELINE (EVOXAC) 30 MG CAPSULE    Take 1 capsule (30 mg total) by mouth 2 (two) times daily.    DICLOFENAC SODIUM (VOLTAREN) 1 % GEL     Apply 2 g topically 2 (two) times daily.    FLUTICASONE (FLONASE) 50 MCG/ACTUATION NASAL SPRAY    1  Spray each nostril Every day    FOLIC ACID (FOLVITE) 1 MG TABLET    Take 1 tablet (1 mg total) by mouth once daily.    HYDROXYCHLOROQUINE (PLAQUENIL) 200 MG TABLET    TAKE 2 TABLETS ONCE DAILY    MELOXICAM (MOBIC) 15 MG TABLET    Take 15 mg by mouth once daily.    METHOTREXATE 2.5 MG TAB    Take 4 tablets (10 mg total) by mouth every 7 days.    MISCELLANEOUS MEDICAL SUPPLY PACK    1 soft L hand splint for carpal tunnel       Assessment/Plans:-  73 y.o. pleasant female with a history of primary Sjogren's syndrome, inflammatory arthritis   (seropositive RA) and Raynaud's phenomena present to the clinic today for bilateral UE weakness and pain (L>R)     Physical examination - Joint examination was unremarkable.  ROM and strength intact.  No signs of synovitis.  Pulmonary exam - unremarkable.  No signs of erythema/exudate in the throat.       Multiple flares (one in Nov 2018 and March 2019).  Plaquenil does not appear to be sufficient for RA.  MTX 10mg PO Q weekly with folic acid was added at the last visit.    SOB/PULIDO most concerning - ddx includes infectious process (viral - unlikely to be PNA given negative CXR) vs medication induced (MTX? - usually pneumonitis?) vs cardiac etiology vs sleep apnea vs ILD from RA?    Plan  - Hold MTX 10mg Q weekly at this time  - Continue folic acid daily  - Continue PT for cervical spine and carpal tunnel  - Continue plaquenil 400mg and cevimeline  - Continue night hand splint   - PFTs, ECHO - patient wish to see if symptoms would resolve.  Scheduled for~2 weeks out.  If symptoms does not resolve, she would continue with further testing   - Cardiology referral   - Patient refuse Avara 10mg at this time.  She does not want to take any more medications.  Patient took to keep us informed about symptoms and possible RA flare.   - Continue daily water aerobic exercises    Follow up in 3  months.     Answers for HPI/ROS submitted by the patient on 4/10/2019   trouble swallowing: No  unexpected weight change: Yes  genital sore: No

## 2019-04-10 NOTE — TELEPHONE ENCOUNTER
Called and spoke with patient.      Patient is not feeling well overall.  Decreased appetite and weight loss with occasional losing of her voice.  Patient feels SOB with mild excretion. This all started after initiation of MTX.  Patient discontinue MTX x 2 weeks already (after taking only one dose).  Completed Medrol dose pack.  Denies any recent sick contacts, fever/chills, travels, N/V, abdominal pain, diarrhea.    Joints are doing well - no pain or swelling.       Lab work done yesterday was unremarkable.     Told patient to discontinue MTX but continue folic acid.  Continue plaquenil at this time     Order CXR -

## 2019-04-11 ENCOUNTER — OFFICE VISIT (OUTPATIENT)
Dept: RHEUMATOLOGY | Facility: CLINIC | Age: 74
End: 2019-04-11
Payer: MEDICARE

## 2019-04-11 VITALS
HEART RATE: 83 BPM | SYSTOLIC BLOOD PRESSURE: 104 MMHG | WEIGHT: 178.38 LBS | BODY MASS INDEX: 29.72 KG/M2 | DIASTOLIC BLOOD PRESSURE: 61 MMHG | HEIGHT: 65 IN

## 2019-04-11 DIAGNOSIS — R06.02 SHORTNESS OF BREATH: Primary | ICD-10-CM

## 2019-04-11 PROCEDURE — 3074F SYST BP LT 130 MM HG: CPT | Mod: HCNC,CPTII,GC,S$GLB | Performed by: STUDENT IN AN ORGANIZED HEALTH CARE EDUCATION/TRAINING PROGRAM

## 2019-04-11 PROCEDURE — 3078F DIAST BP <80 MM HG: CPT | Mod: HCNC,CPTII,GC,S$GLB | Performed by: STUDENT IN AN ORGANIZED HEALTH CARE EDUCATION/TRAINING PROGRAM

## 2019-04-11 PROCEDURE — 3074F PR MOST RECENT SYSTOLIC BLOOD PRESSURE < 130 MM HG: ICD-10-PCS | Mod: HCNC,CPTII,GC,S$GLB | Performed by: STUDENT IN AN ORGANIZED HEALTH CARE EDUCATION/TRAINING PROGRAM

## 2019-04-11 PROCEDURE — 1101F PT FALLS ASSESS-DOCD LE1/YR: CPT | Mod: HCNC,CPTII,GC,S$GLB | Performed by: STUDENT IN AN ORGANIZED HEALTH CARE EDUCATION/TRAINING PROGRAM

## 2019-04-11 PROCEDURE — 3078F PR MOST RECENT DIASTOLIC BLOOD PRESSURE < 80 MM HG: ICD-10-PCS | Mod: HCNC,CPTII,GC,S$GLB | Performed by: STUDENT IN AN ORGANIZED HEALTH CARE EDUCATION/TRAINING PROGRAM

## 2019-04-11 PROCEDURE — 99214 OFFICE O/P EST MOD 30 MIN: CPT | Mod: HCNC,GC,S$GLB, | Performed by: STUDENT IN AN ORGANIZED HEALTH CARE EDUCATION/TRAINING PROGRAM

## 2019-04-11 PROCEDURE — 99999 PR PBB SHADOW E&M-EST. PATIENT-LVL V: ICD-10-PCS | Mod: PBBFAC,HCNC,GC, | Performed by: STUDENT IN AN ORGANIZED HEALTH CARE EDUCATION/TRAINING PROGRAM

## 2019-04-11 PROCEDURE — 99999 PR PBB SHADOW E&M-EST. PATIENT-LVL V: CPT | Mod: PBBFAC,HCNC,GC, | Performed by: STUDENT IN AN ORGANIZED HEALTH CARE EDUCATION/TRAINING PROGRAM

## 2019-04-11 PROCEDURE — 99214 PR OFFICE/OUTPT VISIT, EST, LEVL IV, 30-39 MIN: ICD-10-PCS | Mod: HCNC,GC,S$GLB, | Performed by: STUDENT IN AN ORGANIZED HEALTH CARE EDUCATION/TRAINING PROGRAM

## 2019-04-11 PROCEDURE — 1101F PR PT FALLS ASSESS DOC 0-1 FALLS W/OUT INJ PAST YR: ICD-10-PCS | Mod: HCNC,CPTII,GC,S$GLB | Performed by: STUDENT IN AN ORGANIZED HEALTH CARE EDUCATION/TRAINING PROGRAM

## 2019-04-11 NOTE — TELEPHONE ENCOUNTER
Patient with only slight improvement in symptoms; her recent message indicates that she may have more dyspnea on exertion than she appeared to have at her recent visit on 4/2 so may merit cardiac evaluation (EKG, echo) as next step.   Labs so far unremarkable; renal function improved.    Will forward note to Dr Li who is seeing her tomorrow.

## 2019-04-11 NOTE — PROGRESS NOTES
I have reviewed the notes, documentation by  and I agree with the recommendations.    All recommendations detailed in her note,patient wishes to defer all the tests to a week later although it was offered to be done asap.  If she has worsening of her shortness of breath,fatigue she will contact us sooner  She also didn't want to start arava right away  If she has a RA flare she will contact us

## 2019-04-17 ENCOUNTER — PATIENT MESSAGE (OUTPATIENT)
Dept: RHEUMATOLOGY | Facility: CLINIC | Age: 74
End: 2019-04-17

## 2019-04-23 ENCOUNTER — PATIENT MESSAGE (OUTPATIENT)
Dept: OPTOMETRY | Facility: CLINIC | Age: 74
End: 2019-04-23

## 2019-04-23 ENCOUNTER — TELEPHONE (OUTPATIENT)
Dept: RHEUMATOLOGY | Facility: CLINIC | Age: 74
End: 2019-04-23

## 2019-04-23 ENCOUNTER — HOSPITAL ENCOUNTER (OUTPATIENT)
Dept: PULMONOLOGY | Facility: CLINIC | Age: 74
Discharge: HOME | End: 2019-04-23
Payer: MEDICARE

## 2019-04-23 ENCOUNTER — HOSPITAL ENCOUNTER (OUTPATIENT)
Dept: CARDIOLOGY | Facility: CLINIC | Age: 74
Discharge: HOME | End: 2019-04-23
Attending: STUDENT IN AN ORGANIZED HEALTH CARE EDUCATION/TRAINING PROGRAM
Payer: MEDICARE

## 2019-04-23 VITALS
BODY MASS INDEX: 29.66 KG/M2 | SYSTOLIC BLOOD PRESSURE: 132 MMHG | HEART RATE: 70 BPM | WEIGHT: 178 LBS | DIASTOLIC BLOOD PRESSURE: 82 MMHG | HEIGHT: 65 IN

## 2019-04-23 DIAGNOSIS — R06.02 SHORTNESS OF BREATH: ICD-10-CM

## 2019-04-23 LAB
ASCENDING AORTA: 2.64 CM
AV INDEX (PROSTH): 1.12
AV MEAN GRADIENT: 3.61 MMHG
AV PEAK GRADIENT: 7.4 MMHG
AV VALVE AREA: 3.42 CM2
AV VELOCITY RATIO: 1.16
BSA FOR ECHO PROCEDURE: 1.92 M2
CV ECHO LV RWT: 0.3 CM
DOP CALC AO PEAK VEL: 1.36 M/S
DOP CALC AO VTI: 27.67 CM
DOP CALC LVOT AREA: 3.05 CM2
DOP CALC LVOT DIAMETER: 1.97 CM
DOP CALC LVOT PEAK VEL: 1.58 M/S
DOP CALC LVOT STROKE VOLUME: 94.62 CM3
DOP CALCLVOT PEAK VEL VTI: 31.06 CM
E WAVE DECELERATION TIME: 166.56 MSEC
E/A RATIO: 0.91
E/E' RATIO: 10
ECHO LV POSTERIOR WALL: 0.68 CM (ref 0.6–1.1)
FRACTIONAL SHORTENING: 22 % (ref 28–44)
INTERVENTRICULAR SEPTUM: 0.59 CM (ref 0.6–1.1)
IVRT: 0.11 MSEC
LA MAJOR: 3.86 CM
LA MINOR: 4.19 CM
LA WIDTH: 3.7 CM
LEFT ATRIUM SIZE: 3.46 CM
LEFT ATRIUM VOLUME INDEX: 23.2 ML/M2
LEFT ATRIUM VOLUME: 43.73 CM3
LEFT INTERNAL DIMENSION IN SYSTOLE: 3.56 CM (ref 2.1–4)
LEFT VENTRICLE DIASTOLIC VOLUME INDEX: 51.01 ML/M2
LEFT VENTRICLE DIASTOLIC VOLUME: 96.02 ML
LEFT VENTRICLE MASS INDEX: 46.2 G/M2
LEFT VENTRICLE SYSTOLIC VOLUME INDEX: 28.2 ML/M2
LEFT VENTRICLE SYSTOLIC VOLUME: 53.14 ML
LEFT VENTRICULAR INTERNAL DIMENSION IN DIASTOLE: 4.57 CM (ref 3.5–6)
LEFT VENTRICULAR MASS: 86.91 G
LV LATERAL E/E' RATIO: 9.38
LV SEPTAL E/E' RATIO: 10.71
MV PEAK A VEL: 0.82 M/S
MV PEAK E VEL: 0.75 M/S
PISA TR MAX VEL: 2.47 M/S
PRE FEV1 FVC: 76
PRE FEV1: 2.5
PRE FVC: 3.28
PREDICTED FEV1 FVC: 77
PREDICTED FEV1: 2.26
PREDICTED FVC: 2.93
PULM VEIN S/D RATIO: 0.85
PV PEAK D VEL: 0.41 M/S
PV PEAK S VEL: 0.35 M/S
RA MAJOR: 3.89 CM
RA PRESSURE: 3 MMHG
RA WIDTH: 2.76 CM
RIGHT VENTRICULAR END-DIASTOLIC DIMENSION: 3.31 CM
RV TISSUE DOPPLER FREE WALL SYSTOLIC VELOCITY 1 (APICAL 4 CHAMBER VIEW): 12.65 M/S
SINUS: 2.83 CM
STJ: 2.41 CM
TDI LATERAL: 0.08
TDI SEPTAL: 0.07
TDI: 0.08
TR MAX PG: 24.4 MMHG
TRICUSPID ANNULAR PLANE SYSTOLIC EXCURSION: 1.78 CM
TV REST PULMONARY ARTERY PRESSURE: 27 MMHG

## 2019-04-23 PROCEDURE — 94727 GAS DIL/WSHOT DETER LNG VOL: CPT | Mod: HCNC,S$GLB,, | Performed by: INTERNAL MEDICINE

## 2019-04-23 PROCEDURE — 93306 TTE W/DOPPLER COMPLETE: CPT | Mod: HCNC,S$GLB,, | Performed by: INTERNAL MEDICINE

## 2019-04-23 PROCEDURE — 94727 PR PULM FUNCTION TEST BY GAS: ICD-10-PCS | Mod: HCNC,S$GLB,, | Performed by: INTERNAL MEDICINE

## 2019-04-23 PROCEDURE — 93306 TRANSTHORACIC ECHO (TTE) COMPLETE (CUPID ONLY): ICD-10-PCS | Mod: HCNC,S$GLB,, | Performed by: INTERNAL MEDICINE

## 2019-04-23 PROCEDURE — 94729 DIFFUSING CAPACITY: CPT | Mod: HCNC,S$GLB,, | Performed by: INTERNAL MEDICINE

## 2019-04-23 PROCEDURE — 94010 BREATHING CAPACITY TEST: CPT | Mod: HCNC,S$GLB,, | Performed by: INTERNAL MEDICINE

## 2019-04-23 PROCEDURE — 94729 PR C02/MEMBANE DIFFUSE CAPACITY: ICD-10-PCS | Mod: HCNC,S$GLB,, | Performed by: INTERNAL MEDICINE

## 2019-04-23 PROCEDURE — 94010 BREATHING CAPACITY TEST: ICD-10-PCS | Mod: HCNC,S$GLB,, | Performed by: INTERNAL MEDICINE

## 2019-04-23 NOTE — TELEPHONE ENCOUNTER
----- Message from Corinna Ritchie sent at 4/23/2019  9:06 AM CDT -----  Contact: pt  Pt would like to be called back regarding her lab appts    Pt can be reached at [phone]

## 2019-04-23 NOTE — TELEPHONE ENCOUNTER
----- Message from Gertrude Tobias sent at 4/23/2019 12:03 PM CDT -----  Contact: Self  Pt is returning call from jerry layton can be reached @[phone]

## 2019-04-24 ENCOUNTER — CLINICAL SUPPORT (OUTPATIENT)
Dept: REHABILITATION | Facility: HOSPITAL | Age: 74
End: 2019-04-24
Payer: MEDICARE

## 2019-04-24 DIAGNOSIS — R29.3 POSTURE IMBALANCE: ICD-10-CM

## 2019-04-24 DIAGNOSIS — M54.12 CERVICAL RADICULOPATHY: Primary | ICD-10-CM

## 2019-04-24 PROCEDURE — 97110 THERAPEUTIC EXERCISES: CPT | Mod: HCNC

## 2019-04-24 NOTE — PROGRESS NOTES
Physical Therapy Discharge Note       Name: Urmila Martínez  [facility] Number: [id number]  Diagnosis:   No diagnosis found.  Physician: Ana Li, *  Treatment Orders: PT Eval and Treat  Past Medical History:   Diagnosis Date    Arthritis     Asymptomatic PVCs     Benign liver cyst 09/26/2012    Fibrocystic breast 1995    left     Fibrocystic breast 1995    left    Fibrocystic breast 1997    right    Joint pain     Kidney stones 07/20/2012    NS (nuclear sclerosis) 7/19/2013    Osteopenia     Raynaud's disease     Rheumatoid arthritis(714.0)     Sjogren's disease      Current Outpatient Medications   Medication Sig    alendronate (FOSAMAX) 70 MG tablet TAKE 1 TABLET (70 MG TOTAL) BY MOUTH EVERY 7 DAYS.    ascorbic acid (VITAMIN C) 500 MG tablet Take 500 mg by mouth once daily.    Aspirin Child 81 mg Chew Take by mouth. 1 Tablet, Chewable Oral 4x times weekly    atorvastatin (LIPITOR) 20 MG tablet Take 1 tablet (20 mg total) by mouth once daily.    baclofen (LIORESAL) 10 MG tablet Take 1 tablet (10 mg total) by mouth 2 (two) times daily.    calcium citrate-vitamin D3 315-200 mg (CITRACAL+D) 315-200 mg-unit per tablet Take 1 tablet by mouth 2 (two) times daily.    cevimeline (EVOXAC) 30 mg capsule Take 1 capsule (30 mg total) by mouth 2 (two) times daily.    diclofenac sodium (VOLTAREN) 1 % Gel Apply 2 g topically 2 (two) times daily.    fluticasone (FLONASE) 50 mcg/actuation nasal spray 1  Spray each nostril Every day    folic acid (FOLVITE) 1 MG tablet Take 1 tablet (1 mg total) by mouth once daily.    hydroxychloroquine (PLAQUENIL) 200 mg tablet TAKE 2 TABLETS ONCE DAILY    meloxicam (MOBIC) 15 MG tablet Take 15 mg by mouth once daily.    methotrexate 2.5 MG Tab Take 4 tablets (10 mg total) by mouth every 7 days.    miscellaneous medical supply Pack 1 soft L hand splint for carpal tunnel     No current facility-administered  medications for this visit.      Review of patient's allergies indicates:   Allergen Reactions    Doxycycline      Other reaction(s): vomit  Other reaction(s): Hives    Sulfa (sulfonamide antibiotics) Nausea And Vomiting       Visit Date: 3/21/2019  Evaluation Date: 2/26/19  Visit # authorized: 6/20  Authorization period: 2/7/20  Plan of care Expiration: 4/9/19  MD referral: cervical radiculopathy     Time In: 9:55  Time Out: 10:25  Total Billable Time: 30 minutes     Precautions: Standard    Subjective     Pt reports no pain but still having side effects from meds. Going to MD tomorrow to get blood work  Pain Scale: Urmila rates pain at neck/shoulder on a scale of 0-10 to be 0 currently.    Objective     Cervical ROM: (measured in degrees)     Degrees   extension 40   Flexion 45   Right SB 45   Left SB 45 pulling    Right rotation 65   Left rotation 65 Pulling       Shoulder Active/ Passive ROM: (measured in degrees)   Shoulder Right UE Left UE   Flexion  WNLs WNLs pain at 45 deg    Abduction WNLs WNLs pain at 45   ER WNL 90 pain at 30    IR WNLs 70 pain at 10      Sensation: Dermatomes: Intact     Reflexes: C5/T1: 2 bilaterally     Upper Extremity Strength: (grading 1-5 out of 5) * indicating pain       Right UE Left UE   Shoulder Flexion: 5/5 5/5   Shoulder Abduction: 5/5 5/5   Shoulder ER: by side 5/5 5/5   Shoulder IR: by side 5/5 5/5           Elbow Flexion: 4+/5 4+/5   Elbow Extension: 4+/5 4+/5           Wrist flexion: 4+/5 4+/5   Wrist extension: 4+/5 4+/5           Gross grasp 5 5/5                     Urmila received individual therapeutic exercises to develop strength, endurance, ROM, flexibility, posture and core stabilization for 15 minutes including:  UBE 2/2 min   Chin tuck with sb 10x ea NP  Rotations 20x  Urmila received cold pack for 10 minutes to.    Written Home Exercises Provided: at eval  Pt demo good understanding of the education provided. Urmila demonstrated good return demonstration of  activities.     Education provided re: cont HEP  Urmila verbalized good understanding of education provided.   No spiritual or educational barriers to learning provided    Assessment     Patient has met all goals. DC to HEP at this time   Anticipated barriers to physical therapy: None     Goals:   Short Term GOALS: 3 weeks. Pt agrees with goals set.  1. Patient demonstrates independence with HEP. MET  2. Patient demonstrates independence with Postural Awareness. MET  3. Patient demonstrates independence with body mechanics. MET     Long Term GOALS: 6 weeks. Pt agrees with goals set.  1. Patient demonstrates increased cervical ROM by 5 deg to improve tolerance to functional activities. MET  2. Patient demonstrates increased strength BUE's by 1 MMT grade or greater to improve tolerance to functional activities. MET  3. Patient demonstrates improved overall function per FOTO Neck Survey to 34% or less. MET     Plan     DC to HEP .    Therapist: Nirmal Gaming, PT,   4/24/2019

## 2019-04-25 ENCOUNTER — LAB VISIT (OUTPATIENT)
Dept: LAB | Facility: HOSPITAL | Age: 74
End: 2019-04-25
Payer: MEDICARE

## 2019-04-25 DIAGNOSIS — M06.9 RHEUMATOID ARTHRITIS INVOLVING MULTIPLE SITES, UNSPECIFIED RHEUMATOID FACTOR PRESENCE: ICD-10-CM

## 2019-04-25 DIAGNOSIS — M54.12 CERVICAL RADICULOPATHY: ICD-10-CM

## 2019-04-25 DIAGNOSIS — G56.00 CARPAL TUNNEL SYNDROME, UNSPECIFIED LATERALITY: ICD-10-CM

## 2019-04-25 DIAGNOSIS — M35.00 SJOGREN'S SYNDROME, WITH UNSPECIFIED ORGAN INVOLVEMENT: ICD-10-CM

## 2019-04-25 DIAGNOSIS — M85.80 OSTEOPENIA DETERMINED BY X-RAY: ICD-10-CM

## 2019-04-25 DIAGNOSIS — M15.9 PRIMARY OSTEOARTHRITIS INVOLVING MULTIPLE JOINTS: ICD-10-CM

## 2019-04-25 LAB
ALBUMIN SERPL BCP-MCNC: 4 G/DL (ref 3.5–5.2)
ALP SERPL-CCNC: 53 U/L (ref 55–135)
ALT SERPL W/O P-5'-P-CCNC: 15 U/L (ref 10–44)
ANION GAP SERPL CALC-SCNC: 8 MMOL/L (ref 8–16)
AST SERPL-CCNC: 22 U/L (ref 10–40)
BASOPHILS # BLD AUTO: 0.11 K/UL (ref 0–0.2)
BASOPHILS NFR BLD: 1.8 % (ref 0–1.9)
BILIRUB SERPL-MCNC: 1.3 MG/DL (ref 0.1–1)
BUN SERPL-MCNC: 10 MG/DL (ref 8–23)
CALCIUM SERPL-MCNC: 10 MG/DL (ref 8.7–10.5)
CHLORIDE SERPL-SCNC: 106 MMOL/L (ref 95–110)
CO2 SERPL-SCNC: 28 MMOL/L (ref 23–29)
CREAT SERPL-MCNC: 0.9 MG/DL (ref 0.5–1.4)
CRP SERPL-MCNC: 0.5 MG/L (ref 0–8.2)
DIFFERENTIAL METHOD: NORMAL
EOSINOPHIL # BLD AUTO: 0.1 K/UL (ref 0–0.5)
EOSINOPHIL NFR BLD: 0.8 % (ref 0–8)
ERYTHROCYTE [DISTWIDTH] IN BLOOD BY AUTOMATED COUNT: 12.5 % (ref 11.5–14.5)
ERYTHROCYTE [SEDIMENTATION RATE] IN BLOOD BY WESTERGREN METHOD: 5 MM/HR (ref 0–36)
EST. GFR  (AFRICAN AMERICAN): >60 ML/MIN/1.73 M^2
EST. GFR  (NON AFRICAN AMERICAN): >60 ML/MIN/1.73 M^2
GLUCOSE SERPL-MCNC: 90 MG/DL (ref 70–110)
HCT VFR BLD AUTO: 40.9 % (ref 37–48.5)
HGB BLD-MCNC: 13.4 G/DL (ref 12–16)
IMM GRANULOCYTES # BLD AUTO: 0.01 K/UL (ref 0–0.04)
IMM GRANULOCYTES NFR BLD AUTO: 0.2 % (ref 0–0.5)
LYMPHOCYTES # BLD AUTO: 1.7 K/UL (ref 1–4.8)
LYMPHOCYTES NFR BLD: 28.6 % (ref 18–48)
MCH RBC QN AUTO: 30.9 PG (ref 27–31)
MCHC RBC AUTO-ENTMCNC: 32.8 G/DL (ref 32–36)
MCV RBC AUTO: 95 FL (ref 82–98)
MONOCYTES # BLD AUTO: 0.6 K/UL (ref 0.3–1)
MONOCYTES NFR BLD: 10.3 % (ref 4–15)
NEUTROPHILS # BLD AUTO: 3.5 K/UL (ref 1.8–7.7)
NEUTROPHILS NFR BLD: 58.3 % (ref 38–73)
NRBC BLD-RTO: 0 /100 WBC
PLATELET # BLD AUTO: 264 K/UL (ref 150–350)
PMV BLD AUTO: 10.7 FL (ref 9.2–12.9)
POTASSIUM SERPL-SCNC: 4.5 MMOL/L (ref 3.5–5.1)
PROT SERPL-MCNC: 7.1 G/DL (ref 6–8.4)
RBC # BLD AUTO: 4.33 M/UL (ref 4–5.4)
SODIUM SERPL-SCNC: 142 MMOL/L (ref 136–145)
WBC # BLD AUTO: 5.95 K/UL (ref 3.9–12.7)

## 2019-04-25 PROCEDURE — 86140 C-REACTIVE PROTEIN: CPT | Mod: HCNC

## 2019-04-25 PROCEDURE — 36415 COLL VENOUS BLD VENIPUNCTURE: CPT | Mod: HCNC

## 2019-04-25 PROCEDURE — 85652 RBC SED RATE AUTOMATED: CPT | Mod: HCNC

## 2019-04-25 PROCEDURE — 80053 COMPREHEN METABOLIC PANEL: CPT | Mod: HCNC

## 2019-04-25 PROCEDURE — 85025 COMPLETE CBC W/AUTO DIFF WBC: CPT | Mod: HCNC

## 2019-05-06 ENCOUNTER — OFFICE VISIT (OUTPATIENT)
Dept: CARDIOLOGY | Facility: CLINIC | Age: 74
End: 2019-05-06
Payer: MEDICARE

## 2019-05-06 VITALS
SYSTOLIC BLOOD PRESSURE: 132 MMHG | HEIGHT: 65 IN | BODY MASS INDEX: 29.34 KG/M2 | DIASTOLIC BLOOD PRESSURE: 59 MMHG | WEIGHT: 176.13 LBS | OXYGEN SATURATION: 97 % | HEART RATE: 73 BPM

## 2019-05-06 DIAGNOSIS — R06.00 DYSPNEA, UNSPECIFIED TYPE: ICD-10-CM

## 2019-05-06 PROCEDURE — 1101F PR PT FALLS ASSESS DOC 0-1 FALLS W/OUT INJ PAST YR: ICD-10-PCS | Mod: HCNC,CPTII,GC,S$GLB | Performed by: STUDENT IN AN ORGANIZED HEALTH CARE EDUCATION/TRAINING PROGRAM

## 2019-05-06 PROCEDURE — 3075F PR MOST RECENT SYSTOLIC BLOOD PRESS GE 130-139MM HG: ICD-10-PCS | Mod: HCNC,CPTII,GC,S$GLB | Performed by: STUDENT IN AN ORGANIZED HEALTH CARE EDUCATION/TRAINING PROGRAM

## 2019-05-06 PROCEDURE — 99203 PR OFFICE/OUTPT VISIT, NEW, LEVL III, 30-44 MIN: ICD-10-PCS | Mod: HCNC,GC,S$GLB, | Performed by: STUDENT IN AN ORGANIZED HEALTH CARE EDUCATION/TRAINING PROGRAM

## 2019-05-06 PROCEDURE — 3075F SYST BP GE 130 - 139MM HG: CPT | Mod: HCNC,CPTII,GC,S$GLB | Performed by: STUDENT IN AN ORGANIZED HEALTH CARE EDUCATION/TRAINING PROGRAM

## 2019-05-06 PROCEDURE — 99203 OFFICE O/P NEW LOW 30 MIN: CPT | Mod: HCNC,GC,S$GLB, | Performed by: STUDENT IN AN ORGANIZED HEALTH CARE EDUCATION/TRAINING PROGRAM

## 2019-05-06 PROCEDURE — 3078F PR MOST RECENT DIASTOLIC BLOOD PRESSURE < 80 MM HG: ICD-10-PCS | Mod: HCNC,CPTII,GC,S$GLB | Performed by: STUDENT IN AN ORGANIZED HEALTH CARE EDUCATION/TRAINING PROGRAM

## 2019-05-06 PROCEDURE — 1101F PT FALLS ASSESS-DOCD LE1/YR: CPT | Mod: HCNC,CPTII,GC,S$GLB | Performed by: STUDENT IN AN ORGANIZED HEALTH CARE EDUCATION/TRAINING PROGRAM

## 2019-05-06 PROCEDURE — 3078F DIAST BP <80 MM HG: CPT | Mod: HCNC,CPTII,GC,S$GLB | Performed by: STUDENT IN AN ORGANIZED HEALTH CARE EDUCATION/TRAINING PROGRAM

## 2019-05-06 PROCEDURE — 99999 PR PBB SHADOW E&M-EST. PATIENT-LVL IV: CPT | Mod: PBBFAC,HCNC,GC, | Performed by: STUDENT IN AN ORGANIZED HEALTH CARE EDUCATION/TRAINING PROGRAM

## 2019-05-06 PROCEDURE — 99999 PR PBB SHADOW E&M-EST. PATIENT-LVL IV: ICD-10-PCS | Mod: PBBFAC,HCNC,GC, | Performed by: STUDENT IN AN ORGANIZED HEALTH CARE EDUCATION/TRAINING PROGRAM

## 2019-05-06 NOTE — PROGRESS NOTES
Cardiology Clinic Note  Reason for Visit: dyspnea on exertion     HPI:   75 y/o F referred to cardiology for dyspnea on exertion.  Her symptoms have resolved, denies any dyspnea at rest or exertion. She did have dyspnea on exertion a month ago while she had her rheumatoid arthritis flare up.   No further dyspnea on rest or exertion, no angina, no orthopnea no LE edema.  Echo normal EF no pericardial effusion, normal RV PASP 27, PFTs normal.    ROS:    Review of Systems   Constitution: Negative.   HENT: Negative.    Eyes: Negative.    Cardiovascular: Negative.    Respiratory: Negative.    Endocrine: Negative.    Gastrointestinal: Negative.    Genitourinary: Negative.      PMH:     Past Medical History:   Diagnosis Date    Arthritis     Asymptomatic PVCs     Benign liver cyst 09/26/2012    Fibrocystic breast 1995    left     Fibrocystic breast 1995    left    Fibrocystic breast 1997    right    Joint pain     Kidney stones 07/20/2012    NS (nuclear sclerosis) 7/19/2013    Osteopenia     Raynaud's disease     Rheumatoid arthritis(714.0)     Sjogren's disease      Past Surgical History:   Procedure Laterality Date    BREAST BIOPSY Bilateral     2 times - core needle right breast, biopsy left breast: fibrocystic findings    CHOLECYSTECTOMY  1990's     COLONOSCOPY N/A 10/20/2015    Performed by [facility] ([facility]TH [facility])    CYST REMOVAL      right ankle - benign cyst removed at 7yrs old     CYST REMOVAL  about 2010    cyst removed from forehead     TONSILLECTOMY, ADENOIDECTOMY      during childhood      Allergies:     Review of patient's allergies indicates:   Allergen Reactions    Doxycycline      Other reaction(s): vomit  Other reaction(s): Hives    Sulfa (sulfonamide antibiotics) Nausea And Vomiting     Medications:     Current Outpatient Medications on File Prior to Visit   Medication Sig Dispense Refill    alendronate (FOSAMAX) 70 MG tablet TAKE 1 TABLET (70 MG TOTAL) BY  MOUTH EVERY 7 DAYS. 12 tablet 3    ascorbic acid (VITAMIN C) 500 MG tablet Take 500 mg by mouth once daily.      Aspirin Child 81 mg Chew Take by mouth. 1 Tablet, Chewable Oral 4x times weekly      atorvastatin (LIPITOR) 20 MG tablet Take 1 tablet (20 mg total) by mouth once daily. 90 tablet 3    calcium citrate-vitamin D3 315-200 mg (CITRACAL+D) 315-200 mg-unit per tablet Take 1 tablet by mouth 2 (two) times daily.      cevimeline (EVOXAC) 30 mg capsule Take 1 capsule (30 mg total) by mouth 2 (two) times daily. 180 capsule 3    diclofenac sodium (VOLTAREN) 1 % Gel Apply 2 g topically 2 (two) times daily. 100 g 0    fluticasone (FLONASE) 50 mcg/actuation nasal spray 1  Spray each nostril Every day 3 Bottle 3    folic acid (FOLVITE) 1 MG tablet Take 1 tablet (1 mg total) by mouth once daily. 30 tablet 11    hydroxychloroquine (PLAQUENIL) 200 mg tablet TAKE 2 TABLETS ONCE DAILY 180 tablet 0    meloxicam (MOBIC) 15 MG tablet Take 15 mg by mouth once daily.      miscellaneous medical supply Pack 1 soft L hand splint for carpal tunnel      baclofen (LIORESAL) 10 MG tablet Take 1 tablet (10 mg total) by mouth 2 (two) times daily. 60 tablet 0    methotrexate 2.5 MG Tab Take 4 tablets (10 mg total) by mouth every 7 days. 16 tablet 11     No current facility-administered medications on file prior to visit.      Social History:     Social History     Tobacco Use    Smoking status: Never Smoker    Smokeless tobacco: Never Used   Substance Use Topics    Alcohol use: Yes     Frequency: Monthly or less     Drinks per session: Patient refused     Binge frequency: Never     Comment: limited- glass a wine a few times a year     Family History:     Family History   Problem Relation Age of Onset    Liver cancer Mother         liver and colon    Cancer Mother         colon, liver    Heart disease Mother         CABG    Colon cancer Father     Lung cancer Father         thinks colon was first    Cancer Father        "  colon, lung    Heart disease Brother         multiple bypass    Diabetes Brother     Allergies Daughter     Asthma Daughter     Asthma Grandchild     Cancer Maternal Aunt         stomach    Cancer Maternal Grandmother         breast    Breast cancer Maternal Grandmother     Heart disease Maternal Grandfather     COPD Paternal Grandfather     Cancer Maternal Aunt         thyroid    Fabry's disease Cousin     Amblyopia Neg Hx     Blindness Neg Hx     Cataracts Neg Hx     Glaucoma Neg Hx     Hypertension Neg Hx     Macular degeneration Neg Hx     Retinal detachment Neg Hx     Strabismus Neg Hx     Stroke Neg Hx     Thyroid disease Neg Hx     Melanoma Neg Hx      Physical Exam:   BP (!) 132/59 (BP Location: Left arm, Patient Position: Sitting, BP Method: Large (Automatic))   Pulse 73   Ht 5' 5" (1.651 m)   Wt 79.9 kg (176 lb 2.4 oz)   LMP  (LMP Unknown) Comment: LMP age 38  SpO2 97%   BMI 29.31 kg/m²      Physical Exam   Constitutional: She is oriented to person, place, and time. She appears well-developed and well-nourished.   HENT:   Head: Normocephalic and atraumatic.   Eyes: No scleral icterus.   Cardiovascular: Normal rate, regular rhythm and normal heart sounds.   No murmur heard.  Pulmonary/Chest: Effort normal and breath sounds normal.   Musculoskeletal: She exhibits no edema.   Neurological: She is alert and oriented to person, place, and time.   Skin: Skin is warm.       Labs:     Lab Results   Component Value Date     04/25/2019    K 4.5 04/25/2019     04/25/2019    CO2 28 04/25/2019    BUN 10 04/25/2019    CREATININE 0.9 04/25/2019    ANIONGAP 8 04/25/2019     No results found for: HGBA1C  No results found for: BNP, BNPTRIAGEBLO Lab Results   Component Value Date    WBC 5.95 04/25/2019    HGB 13.4 04/25/2019    HCT 40.9 04/25/2019     04/25/2019    GRAN 3.5 04/25/2019    GRAN 58.3 04/25/2019     Lab Results   Component Value Date    CHOL 160 07/17/2018    HDL " 44 07/17/2018    LDLCALC 93.4 07/17/2018    TRIG 113 07/17/2018          Imaging:     Assessment:      1. Dyspnea, unspecified type      75 y/o F referred for dyspnea on exertion, currently asymptomatic, symptoms occurred while she had a RA/Sjorens flare up.  Echo normal, clinical exam unremarkable, PFTs normal.    Plan:   Urmila was seen today for shortness of breath.    Diagnoses and all orders for this visit:    Dyspnea, unspecified type      No further intervention   Agree with statin as she has a elevated ASCVD risk score    RTC prn     Signed:  Thompson De Leon DO  Cardiology Fellow

## 2019-05-06 NOTE — PROGRESS NOTES
I have personally taken the history and examined this patient and agree with the Fellow's note as stated above.    Dyspnea has resolved  Echo shows no structural heart disease  Reassured.

## 2019-05-08 ENCOUNTER — OFFICE VISIT (OUTPATIENT)
Dept: OPTOMETRY | Facility: CLINIC | Age: 74
End: 2019-05-08
Payer: MEDICARE

## 2019-05-08 DIAGNOSIS — H35.372 EPIRETINAL MEMBRANE, LEFT EYE: ICD-10-CM

## 2019-05-08 DIAGNOSIS — G43.109 OCULAR MIGRAINE: Primary | ICD-10-CM

## 2019-05-08 PROCEDURE — 92134 PR COMPUTERIZED OPHTHALMIC IMAGING RETINA: ICD-10-PCS | Mod: HCNC,S$GLB,, | Performed by: OPTOMETRIST

## 2019-05-08 PROCEDURE — 92014 COMPRE OPH EXAM EST PT 1/>: CPT | Mod: HCNC,S$GLB,, | Performed by: OPTOMETRIST

## 2019-05-08 PROCEDURE — 92134 CPTRZ OPH DX IMG PST SGM RTA: CPT | Mod: HCNC,S$GLB,, | Performed by: OPTOMETRIST

## 2019-05-08 PROCEDURE — 99999 PR PBB SHADOW E&M-EST. PATIENT-LVL II: CPT | Mod: PBBFAC,HCNC,, | Performed by: OPTOMETRIST

## 2019-05-08 PROCEDURE — 92014 PR EYE EXAM, EST PATIENT,COMPREHESV: ICD-10-PCS | Mod: HCNC,S$GLB,, | Performed by: OPTOMETRIST

## 2019-05-08 PROCEDURE — 99999 PR PBB SHADOW E&M-EST. PATIENT-LVL II: ICD-10-PCS | Mod: PBBFAC,HCNC,, | Performed by: OPTOMETRIST

## 2019-05-08 NOTE — PROGRESS NOTES
HPI     Urmila Martínez is a 74 y.o. female who returns  for continued eye care.  Urmila has RA (diagnosed 7-10 years ago). This is treated with   hydroxychloroquinone and cevilimine. She experienced an RA flare up in   February (3 months ago). Treatment included oral prednisone, cortisone   shot, and methotrexate. About 3 weeks ago she reports having a visual   disturbance: For ten minutes she saw what she describes as a black and   white Kaleidoscope like bright light.  This started in her center visual   field.  It was visible in monocular and binocular conditions, when eyes   were open and closed. The scotoma slowly moved temporally and faded away.   This lasted for 10 minutes. She explains that around this time, she felt   as if her pupils were not dilating properly.      Other medical history includes Sjogren's, Atherosclerosis, carpal tunnel   syndrome.  Ocular history is significant for epiretinal membrane of the   left eye, cataracts, intermittent exotropia (treated with prism in   glasses).    (--)blurred vision  (--)Headaches  (--)diplopia  (--)flashes  (--)floaters  (--)pain  (+)Itching - gel artificial tear helped  (--)tearing  (--)burning  (--)Dryness  (+) OTC Drops - gel artificial tear  (--)Photophobia    Last edited by Ila Gross, OD on 5/8/2019  5:08 PM. (History)        Review of Systems   Constitutional: Negative for chills, fever and malaise/fatigue.   HENT: Negative for congestion and hearing loss.    Eyes: Negative for blurred vision, double vision, photophobia, pain, discharge and redness.        Visual scotoma   Respiratory: Negative.    Cardiovascular: Negative.    Gastrointestinal: Negative.    Genitourinary: Negative.    Musculoskeletal: Negative.    Skin: Negative.    Neurological: Negative for seizures.   Endo/Heme/Allergies: Negative for environmental allergies.   Psychiatric/Behavioral: Negative.        For exam results, see encounter report    Assessment /Plan     1. Ocular  migraine  -  Retinal Health intact OU  - Return to clinic immediately with any new spontaneous flashes of light, a vail of gray, black or other color come over  vision, or any new floaters      2. ERM - left eye  - OCT to r/o macular edema/pseudohole    3. Intermittent exotropia --> diplopia controled with prism in glasses  - No further treatment needed at this time      Patient education; RTC pending OCT results

## 2019-05-17 ENCOUNTER — PATIENT MESSAGE (OUTPATIENT)
Dept: INTERNAL MEDICINE | Facility: CLINIC | Age: 74
End: 2019-05-17

## 2019-05-17 ENCOUNTER — PATIENT MESSAGE (OUTPATIENT)
Dept: RHEUMATOLOGY | Facility: CLINIC | Age: 74
End: 2019-05-17

## 2019-05-17 ENCOUNTER — PATIENT MESSAGE (OUTPATIENT)
Dept: OPTOMETRY | Facility: CLINIC | Age: 74
End: 2019-05-17

## 2019-05-27 ENCOUNTER — LAB VISIT (OUTPATIENT)
Dept: LAB | Facility: HOSPITAL | Age: 74
End: 2019-05-27
Payer: MEDICARE

## 2019-05-27 ENCOUNTER — PATIENT MESSAGE (OUTPATIENT)
Dept: INTERNAL MEDICINE | Facility: CLINIC | Age: 74
End: 2019-05-27

## 2019-05-27 DIAGNOSIS — M35.00 SJOGREN'S SYNDROME, WITH UNSPECIFIED ORGAN INVOLVEMENT: ICD-10-CM

## 2019-05-27 DIAGNOSIS — M06.9 RHEUMATOID ARTHRITIS INVOLVING MULTIPLE SITES, UNSPECIFIED RHEUMATOID FACTOR PRESENCE: ICD-10-CM

## 2019-05-27 DIAGNOSIS — M54.12 CERVICAL RADICULOPATHY: ICD-10-CM

## 2019-05-27 DIAGNOSIS — M85.80 OSTEOPENIA DETERMINED BY X-RAY: ICD-10-CM

## 2019-05-27 DIAGNOSIS — G56.00 CARPAL TUNNEL SYNDROME, UNSPECIFIED LATERALITY: ICD-10-CM

## 2019-05-27 DIAGNOSIS — M15.9 PRIMARY OSTEOARTHRITIS INVOLVING MULTIPLE JOINTS: ICD-10-CM

## 2019-05-27 LAB
ALBUMIN SERPL BCP-MCNC: 3.8 G/DL (ref 3.5–5.2)
ALP SERPL-CCNC: 55 U/L (ref 55–135)
ALT SERPL W/O P-5'-P-CCNC: 13 U/L (ref 10–44)
ANION GAP SERPL CALC-SCNC: 6 MMOL/L (ref 8–16)
AST SERPL-CCNC: 19 U/L (ref 10–40)
BASOPHILS # BLD AUTO: 0.11 K/UL (ref 0–0.2)
BASOPHILS NFR BLD: 1.8 % (ref 0–1.9)
BILIRUB SERPL-MCNC: 1 MG/DL (ref 0.1–1)
BUN SERPL-MCNC: 12 MG/DL (ref 8–23)
CALCIUM SERPL-MCNC: 10.2 MG/DL (ref 8.7–10.5)
CHLORIDE SERPL-SCNC: 107 MMOL/L (ref 95–110)
CO2 SERPL-SCNC: 29 MMOL/L (ref 23–29)
CREAT SERPL-MCNC: 0.8 MG/DL (ref 0.5–1.4)
CRP SERPL-MCNC: 0.7 MG/L (ref 0–8.2)
DIFFERENTIAL METHOD: ABNORMAL
EOSINOPHIL # BLD AUTO: 0.1 K/UL (ref 0–0.5)
EOSINOPHIL NFR BLD: 0.8 % (ref 0–8)
ERYTHROCYTE [DISTWIDTH] IN BLOOD BY AUTOMATED COUNT: 12.1 % (ref 11.5–14.5)
ERYTHROCYTE [SEDIMENTATION RATE] IN BLOOD BY WESTERGREN METHOD: 5 MM/HR (ref 0–36)
EST. GFR  (AFRICAN AMERICAN): >60 ML/MIN/1.73 M^2
EST. GFR  (NON AFRICAN AMERICAN): >60 ML/MIN/1.73 M^2
GLUCOSE SERPL-MCNC: 87 MG/DL (ref 70–110)
HCT VFR BLD AUTO: 39.6 % (ref 37–48.5)
HGB BLD-MCNC: 13.1 G/DL (ref 12–16)
IMM GRANULOCYTES # BLD AUTO: 0.01 K/UL (ref 0–0.04)
IMM GRANULOCYTES NFR BLD AUTO: 0.2 % (ref 0–0.5)
LYMPHOCYTES # BLD AUTO: 1.7 K/UL (ref 1–4.8)
LYMPHOCYTES NFR BLD: 27.9 % (ref 18–48)
MCH RBC QN AUTO: 31.4 PG (ref 27–31)
MCHC RBC AUTO-ENTMCNC: 33.1 G/DL (ref 32–36)
MCV RBC AUTO: 95 FL (ref 82–98)
MONOCYTES # BLD AUTO: 0.6 K/UL (ref 0.3–1)
MONOCYTES NFR BLD: 10.2 % (ref 4–15)
NEUTROPHILS # BLD AUTO: 3.6 K/UL (ref 1.8–7.7)
NEUTROPHILS NFR BLD: 59.1 % (ref 38–73)
NRBC BLD-RTO: 0 /100 WBC
PLATELET # BLD AUTO: 233 K/UL (ref 150–350)
PMV BLD AUTO: 10.8 FL (ref 9.2–12.9)
POTASSIUM SERPL-SCNC: 4.5 MMOL/L (ref 3.5–5.1)
PROT SERPL-MCNC: 7 G/DL (ref 6–8.4)
RBC # BLD AUTO: 4.17 M/UL (ref 4–5.4)
SODIUM SERPL-SCNC: 142 MMOL/L (ref 136–145)
WBC # BLD AUTO: 6.16 K/UL (ref 3.9–12.7)

## 2019-05-27 PROCEDURE — 86140 C-REACTIVE PROTEIN: CPT | Mod: HCNC

## 2019-05-27 PROCEDURE — 80053 COMPREHEN METABOLIC PANEL: CPT | Mod: HCNC

## 2019-05-27 PROCEDURE — 85025 COMPLETE CBC W/AUTO DIFF WBC: CPT | Mod: HCNC

## 2019-05-27 PROCEDURE — 85652 RBC SED RATE AUTOMATED: CPT | Mod: HCNC

## 2019-05-27 PROCEDURE — 36415 COLL VENOUS BLD VENIPUNCTURE: CPT | Mod: HCNC

## 2019-06-04 ENCOUNTER — PATIENT MESSAGE (OUTPATIENT)
Dept: INTERNAL MEDICINE | Facility: CLINIC | Age: 74
End: 2019-06-04

## 2019-06-04 NOTE — TELEPHONE ENCOUNTER
Form rec'd and reviewed - 8 page form that needs to be completed. I last saw patient about 6 months ago for an urgent visit, and then briefly with resident MD 4/2/19 for another urgent visit.  Called patient, not able to reach. Msg sent to patient  Needs visit to review and complete form.  Please call her to help schedule

## 2019-06-04 NOTE — PROGRESS NOTES
"                                                     RHEUMATOLOGY CLINIC FOLLOW UP VISIT  Chief complaints:-Shortness of breath      HPI:-  Urmila House a 74 y.o.  pleasant female with a history of primary Sjogren's syndrome, inflammatory arthritis (seropositive RA) and Raynaud's phenomena present to the clinic today for L hand numbness that started since mid Nov.      RA controlled and is currently on plaquenil for many years.  Last exam was June 2018.  Noticed a flare of multiple joints a few weeks ago.  Usually resolves in 3 days with NSAIDS.  She did that and all other joints arthralgia improved except for LUE (elbow, wrist, and hand).  Patient continues to feel joint pain and neuropathy.  She feels like numb sensation of the finger tips and it varies which fingers.  Associated symptoms includes weakness - have not drop anything yet.  Symptoms worsen when she wakes up and has to be "massaged" out.  Previously worked in IT - typed a lot.  Went to see PCP - tried a hard/metal splint which worsen the symptoms.  She used it for just 10 days.  Stopped fosmax because of the pain.  Currently complaining of L shoulder discomfort and limited ROM secondary to pain.  Sleeps on her side.  Denies any trauma/falls.       Sjogren controlled.  Does not require eye drops or mouth lubricant.  Using cevimeline daily     Raynaud's controlled with gloves when it's under 60 degrees.      Osteoporosis on fosmax    Patient completed PT/OT for LUE and bilateral carpal tunnel.  Wearing bilateral hand brace in the PM - neuropathy symptoms resolve.     EMG done in Feb showed mild L carpal tunnel.       Interval History    MTX 10mg discontinued since last clinic visit (April 2019).  Taken 2 doses of MTX since but held because of voice change, decreased appetite, SOB/PULIDO.  Patient states that she had been feeling "unwell" because of the MTX.   Had PFTs and ECHO due for evaluation of SOB and PULIDO.  All results were unremarkable.  " Cardiology evaluation and was negative.  SOB/PULIDO resolved.  Denies any recent sick contact, fever/chills, abdominal pain, CP, diarrhea.    Denies any arthralgia and joint pain.  Had steroid injection of the L shoulder and feeling much better.    Taking Meloxicam PRN (only when she is very active - taken 3x since last visit)      Review of Systems   Constitutional: Negative for chills, diaphoresis, fever, malaise/fatigue and weight loss.   HENT: Negative for congestion, ear discharge, ear pain, hearing loss, nosebleeds, sinus pain and tinnitus.    Eyes: Negative for photophobia, pain, discharge and redness.   Respiratory: Negative for cough, hemoptysis, sputum production, shortness of breath, wheezing and stridor.    Cardiovascular: Negative for chest pain, palpitations, orthopnea, claudication, leg swelling and PND.   Gastrointestinal: Negative for abdominal pain, constipation, diarrhea, heartburn, nausea and vomiting.   Genitourinary: Negative for dysuria, frequency, hematuria and urgency.   Musculoskeletal: Negative for back pain, joint pain, myalgias and neck pain.   Skin: Negative for rash.   Neurological: Negative for dizziness, tingling, tremors, weakness and headaches.   Endo/Heme/Allergies: Does not bruise/bleed easily.   Psychiatric/Behavioral: Negative for depression, hallucinations and suicidal ideas. The patient is not nervous/anxious and does not have insomnia.        Past Medical History:   Diagnosis Date    Arthritis     Asymptomatic PVCs     Benign liver cyst 09/26/2012    Fibrocystic breast 1995    left     Fibrocystic breast 1995    left    Fibrocystic breast 1997    right    Joint pain     Kidney stones 07/20/2012    NS (nuclear sclerosis) 7/19/2013    Osteopenia     Raynaud's disease     Rheumatoid arthritis(714.0)     Sjogren's disease        Past Surgical History:   Procedure Laterality Date    BREAST BIOPSY Bilateral     2 times - core needle right breast, biopsy left breast:  "fibrocystic findings    CHOLECYSTECTOMY  1990's     COLONOSCOPY N/A 10/20/2015    Performed by SHARRON Mcdonnell MD at [facility] (4TH F[facility])    CYST REMOVAL      right ankle - benign cyst removed at 7yrs old     CYST REMOVAL  about 2010    cyst removed from forehead     TONSILLECTOMY, ADENOIDECTOMY      during childhood         Social History     Tobacco Use    Smoking status: Never Smoker    Smokeless tobacco: Never Used   Substance Use Topics    Alcohol use: Yes     Frequency: Monthly or less     Drinks per session: Patient refused     Binge frequency: Never     Comment: limited- glass a wine a few times a year    Drug use: No       Family History   Problem Relation Age of Onset    Liver cancer Mother         liver and colon    Cancer Mother         colon, liver    Heart disease Mother         CABG    Colon cancer Father     Lung cancer Father         thinks colon was first    Cancer Father         colon, lung    Heart disease Brother         multiple bypass    Diabetes Brother     Allergies Daughter     Asthma Daughter     Asthma Grandchild     Cancer Maternal Aunt         stomach    Cancer Maternal Grandmother         breast    Breast cancer Maternal Grandmother     Heart disease Maternal Grandfather     COPD Paternal Grandfather     Cancer Maternal Aunt         thyroid    Fabry's disease Cousin     Amblyopia Neg Hx     Blindness Neg Hx     Cataracts Neg Hx     Glaucoma Neg Hx     Hypertension Neg Hx     Macular degeneration Neg Hx     Retinal detachment Neg Hx     Strabismus Neg Hx     Stroke Neg Hx     Thyroid disease Neg Hx     Melanoma Neg Hx        Review of patient's allergies indicates:   Allergen Reactions    Doxycycline      Other reaction(s): vomit  Other reaction(s): Hives    Sulfa (sulfonamide antibiotics) Nausea And Vomiting           Physical examination:-    Vitals:    06/06/19 1003   BP: 131/74   Pulse: 70   Weight: 79.9 kg (176 lb 2.4 oz)   Height: 5' 5" " (1.651 m)   PainSc: 0-No pain       Physical Exam   Constitutional: She is oriented to person, place, and time and well-developed, well-nourished, and in no distress.   HENT:   Head: Normocephalic and atraumatic.   No redness/erythema of the throat  No exudate in pharynx   Eyes: Pupils are equal, round, and reactive to light. Conjunctivae are normal.   Neck: Normal range of motion. Neck supple.   Cardiovascular: Normal rate, regular rhythm and normal heart sounds.   Pulmonary/Chest: Effort normal and breath sounds normal. She exhibits no tenderness.   Abdominal: Soft. Bowel sounds are normal. She exhibits no distension. There is no tenderness.   Musculoskeletal: She exhibits no edema, tenderness or deformity.   Strength and ROM intact  No signs of synovitis    Neurological: She is alert and oriented to person, place, and time. Gait normal.   Skin: Skin is warm and dry.   Psychiatric: Mood, memory, affect and judgment normal.       Radiographs:-  Independent visualization of images done.   Xray fingers - unchanged osseous density at the base of the 1st distal phalanx  Xray hand - obliquely oriented linear lucency along the proximal phalanx of the thumb  Shoulder xray - mild DJD of the AC joint. No fx.     Medication List with Changes/Refills   Current Medications    ALENDRONATE (FOSAMAX) 70 MG TABLET    TAKE 1 TABLET (70 MG TOTAL) BY MOUTH EVERY 7 DAYS.    ASCORBIC ACID (VITAMIN C) 500 MG TABLET    Take 500 mg by mouth once daily.    ASPIRIN CHILD 81 MG CHEW    Take by mouth. 1 Tablet, Chewable Oral 4x times weekly    ATORVASTATIN (LIPITOR) 20 MG TABLET    Take 1 tablet (20 mg total) by mouth once daily.    CALCIUM CITRATE-VITAMIN D3 315-200 MG (CITRACAL+D) 315-200 MG-UNIT PER TABLET    Take 1 tablet by mouth 2 (two) times daily.    CEVIMELINE (EVOXAC) 30 MG CAPSULE    Take 1 capsule (30 mg total) by mouth 2 (two) times daily.    FLUTICASONE (FLONASE) 50 MCG/ACTUATION NASAL SPRAY    1  Spray each nostril Every day     FOLIC ACID (FOLVITE) 1 MG TABLET    Take 1 tablet (1 mg total) by mouth once daily.    HYDROXYCHLOROQUINE (PLAQUENIL) 200 MG TABLET    TAKE 2 TABLETS ONCE DAILY    MELOXICAM (MOBIC) 15 MG TABLET    Take 15 mg by mouth once daily.    MISCELLANEOUS MEDICAL SUPPLY PACK    1 soft L hand splint for carpal tunnel       Assessment/Plans:-  73 y.o. pleasant female with a history of primary Sjogren's syndrome, inflammatory arthritis (seropositive RA) and Raynaud's phenomena present to the clinic today for RA follow up.    At the last visit, patient had L shoulder injection and complaining of SOB/PULIDO.  PFTs, CXR, and ECHO was all unremarkable.        Physical examination - Joint examination was unremarkable.  ROM and strength intact.  No signs of synovitis.  Pulmonary exam - unremarkable.  No signs of erythema/exudate in the throat.     Multiple flares (one in Nov 2018 and March 2019).  Plaquenil does not appear to be sufficient for RA.  MTX discontinued because patient developed SOB/PULIDO.  Feeling well today with no complains.         Plan  - Continue folic acid daily  - Continue plaquenil 400mg and cevimeline  - Continue night hand splint    - Continue daily water aerobic exercises  - Patient okay to reintroduce MTX PO again if she should have a flare again  - Education provided about flare.  She is to notify us immediately   - Meloxicam PRN for pain   - Labs to be done prior to next visit  - Last eye exam was 5/2019 - due for next eye exam 5/2020      Follow up in 4 months.     Answers for HPI/ROS submitted by the patient on 4/10/2019   trouble swallowing: No  unexpected weight change: Yes  genital sore: No

## 2019-06-05 ENCOUNTER — PATIENT MESSAGE (OUTPATIENT)
Dept: INTERNAL MEDICINE | Facility: CLINIC | Age: 74
End: 2019-06-05

## 2019-06-06 ENCOUNTER — OFFICE VISIT (OUTPATIENT)
Dept: INTERNAL MEDICINE | Facility: CLINIC | Age: 74
End: 2019-06-06
Payer: MEDICARE

## 2019-06-06 ENCOUNTER — OFFICE VISIT (OUTPATIENT)
Dept: RHEUMATOLOGY | Facility: CLINIC | Age: 74
End: 2019-06-06
Payer: MEDICARE

## 2019-06-06 VITALS
DIASTOLIC BLOOD PRESSURE: 74 MMHG | HEIGHT: 65 IN | SYSTOLIC BLOOD PRESSURE: 131 MMHG | HEART RATE: 70 BPM | BODY MASS INDEX: 29.34 KG/M2 | WEIGHT: 176.13 LBS

## 2019-06-06 VITALS
HEART RATE: 72 BPM | HEIGHT: 65 IN | DIASTOLIC BLOOD PRESSURE: 74 MMHG | OXYGEN SATURATION: 96 % | WEIGHT: 173.06 LBS | BODY MASS INDEX: 28.83 KG/M2 | SYSTOLIC BLOOD PRESSURE: 126 MMHG

## 2019-06-06 DIAGNOSIS — M85.80 OSTEOPENIA DETERMINED BY X-RAY: ICD-10-CM

## 2019-06-06 DIAGNOSIS — Z79.899 HIGH RISK MEDICATION USE: ICD-10-CM

## 2019-06-06 DIAGNOSIS — I70.0 AORTIC ATHEROSCLEROSIS: ICD-10-CM

## 2019-06-06 DIAGNOSIS — M35.01 SJOGREN'S SYNDROME WITH KERATOCONJUNCTIVITIS SICCA: ICD-10-CM

## 2019-06-06 DIAGNOSIS — Z11.1 TUBERCULOSIS SCREENING: ICD-10-CM

## 2019-06-06 DIAGNOSIS — M35.00 SJOGREN'S SYNDROME, WITH UNSPECIFIED ORGAN INVOLVEMENT: Primary | ICD-10-CM

## 2019-06-06 DIAGNOSIS — M15.9 PRIMARY OSTEOARTHRITIS INVOLVING MULTIPLE JOINTS: ICD-10-CM

## 2019-06-06 DIAGNOSIS — M06.9 RHEUMATOID ARTHRITIS, INVOLVING UNSPECIFIED SITE, UNSPECIFIED RHEUMATOID FACTOR PRESENCE: Primary | ICD-10-CM

## 2019-06-06 DIAGNOSIS — G56.00 CARPAL TUNNEL SYNDROME, UNSPECIFIED LATERALITY: ICD-10-CM

## 2019-06-06 DIAGNOSIS — M06.9 RHEUMATOID ARTHRITIS INVOLVING MULTIPLE SITES, UNSPECIFIED RHEUMATOID FACTOR PRESENCE: ICD-10-CM

## 2019-06-06 DIAGNOSIS — M54.12 CERVICAL RADICULOPATHY: ICD-10-CM

## 2019-06-06 PROCEDURE — 3078F PR MOST RECENT DIASTOLIC BLOOD PRESSURE < 80 MM HG: ICD-10-PCS | Mod: HCNC,CPTII,GC,S$GLB | Performed by: STUDENT IN AN ORGANIZED HEALTH CARE EDUCATION/TRAINING PROGRAM

## 2019-06-06 PROCEDURE — 99499 RISK ADDL DX/OHS AUDIT: ICD-10-PCS | Mod: HCNC,S$GLB,, | Performed by: INTERNAL MEDICINE

## 2019-06-06 PROCEDURE — 99214 PR OFFICE/OUTPT VISIT, EST, LEVL IV, 30-39 MIN: ICD-10-PCS | Mod: HCNC,GC,S$GLB, | Performed by: STUDENT IN AN ORGANIZED HEALTH CARE EDUCATION/TRAINING PROGRAM

## 2019-06-06 PROCEDURE — 3078F DIAST BP <80 MM HG: CPT | Mod: HCNC,CPTII,S$GLB, | Performed by: INTERNAL MEDICINE

## 2019-06-06 PROCEDURE — 3075F PR MOST RECENT SYSTOLIC BLOOD PRESS GE 130-139MM HG: ICD-10-PCS | Mod: HCNC,CPTII,GC,S$GLB | Performed by: STUDENT IN AN ORGANIZED HEALTH CARE EDUCATION/TRAINING PROGRAM

## 2019-06-06 PROCEDURE — 99999 PR PBB SHADOW E&M-EST. PATIENT-LVL III: CPT | Mod: PBBFAC,HCNC,GC, | Performed by: STUDENT IN AN ORGANIZED HEALTH CARE EDUCATION/TRAINING PROGRAM

## 2019-06-06 PROCEDURE — 1101F PT FALLS ASSESS-DOCD LE1/YR: CPT | Mod: HCNC,CPTII,GC,S$GLB | Performed by: STUDENT IN AN ORGANIZED HEALTH CARE EDUCATION/TRAINING PROGRAM

## 2019-06-06 PROCEDURE — 3078F DIAST BP <80 MM HG: CPT | Mod: HCNC,CPTII,GC,S$GLB | Performed by: STUDENT IN AN ORGANIZED HEALTH CARE EDUCATION/TRAINING PROGRAM

## 2019-06-06 PROCEDURE — 99999 PR PBB SHADOW E&M-EST. PATIENT-LVL III: CPT | Mod: PBBFAC,HCNC,, | Performed by: INTERNAL MEDICINE

## 2019-06-06 PROCEDURE — 1101F PT FALLS ASSESS-DOCD LE1/YR: CPT | Mod: HCNC,CPTII,S$GLB, | Performed by: INTERNAL MEDICINE

## 2019-06-06 PROCEDURE — 1101F PR PT FALLS ASSESS DOC 0-1 FALLS W/OUT INJ PAST YR: ICD-10-PCS | Mod: HCNC,CPTII,GC,S$GLB | Performed by: STUDENT IN AN ORGANIZED HEALTH CARE EDUCATION/TRAINING PROGRAM

## 2019-06-06 PROCEDURE — 3074F PR MOST RECENT SYSTOLIC BLOOD PRESSURE < 130 MM HG: ICD-10-PCS | Mod: HCNC,CPTII,S$GLB, | Performed by: INTERNAL MEDICINE

## 2019-06-06 PROCEDURE — 99214 OFFICE O/P EST MOD 30 MIN: CPT | Mod: HCNC,GC,S$GLB, | Performed by: STUDENT IN AN ORGANIZED HEALTH CARE EDUCATION/TRAINING PROGRAM

## 2019-06-06 PROCEDURE — 99499 UNLISTED E&M SERVICE: CPT | Mod: HCNC,S$GLB,, | Performed by: INTERNAL MEDICINE

## 2019-06-06 PROCEDURE — 99214 PR OFFICE/OUTPT VISIT, EST, LEVL IV, 30-39 MIN: ICD-10-PCS | Mod: HCNC,S$GLB,, | Performed by: INTERNAL MEDICINE

## 2019-06-06 PROCEDURE — 99999 PR PBB SHADOW E&M-EST. PATIENT-LVL III: ICD-10-PCS | Mod: PBBFAC,HCNC,GC, | Performed by: STUDENT IN AN ORGANIZED HEALTH CARE EDUCATION/TRAINING PROGRAM

## 2019-06-06 PROCEDURE — 99999 PR PBB SHADOW E&M-EST. PATIENT-LVL III: ICD-10-PCS | Mod: PBBFAC,HCNC,, | Performed by: INTERNAL MEDICINE

## 2019-06-06 PROCEDURE — 1101F PR PT FALLS ASSESS DOC 0-1 FALLS W/OUT INJ PAST YR: ICD-10-PCS | Mod: HCNC,CPTII,S$GLB, | Performed by: INTERNAL MEDICINE

## 2019-06-06 PROCEDURE — 99214 OFFICE O/P EST MOD 30 MIN: CPT | Mod: HCNC,S$GLB,, | Performed by: INTERNAL MEDICINE

## 2019-06-06 PROCEDURE — 3075F SYST BP GE 130 - 139MM HG: CPT | Mod: HCNC,CPTII,GC,S$GLB | Performed by: STUDENT IN AN ORGANIZED HEALTH CARE EDUCATION/TRAINING PROGRAM

## 2019-06-06 PROCEDURE — 3074F SYST BP LT 130 MM HG: CPT | Mod: HCNC,CPTII,S$GLB, | Performed by: INTERNAL MEDICINE

## 2019-06-06 PROCEDURE — 3078F PR MOST RECENT DIASTOLIC BLOOD PRESSURE < 80 MM HG: ICD-10-PCS | Mod: HCNC,CPTII,S$GLB, | Performed by: INTERNAL MEDICINE

## 2019-06-06 RX ORDER — CEVIMELINE HYDROCHLORIDE 30 MG/1
30 CAPSULE ORAL 2 TIMES DAILY
Qty: 180 CAPSULE | Refills: 3 | Status: SHIPPED | OUTPATIENT
Start: 2019-06-06 | End: 2019-06-10 | Stop reason: SDUPTHER

## 2019-06-06 RX ORDER — HYDROXYCHLOROQUINE SULFATE 200 MG/1
TABLET, FILM COATED ORAL
Qty: 180 TABLET | Refills: 0 | Status: SHIPPED | OUTPATIENT
Start: 2019-06-06 | End: 2019-06-10 | Stop reason: SDUPTHER

## 2019-06-06 ASSESSMENT — ROUTINE ASSESSMENT OF PATIENT INDEX DATA (RAPID3)
FATIGUE SCORE: .5
PATIENT GLOBAL ASSESSMENT SCORE: .5
PAIN SCORE: .5
MDHAQ FUNCTION SCORE: .2
TOTAL RAPID3 SCORE: .56
PSYCHOLOGICAL DISTRESS SCORE: 0
AM STIFFNESS SCORE: 0, NO

## 2019-06-06 NOTE — PROGRESS NOTES
Rapid3 Question Responses and Scores 6/3/2019   MDHAQ Score 0.2   Psychologic Score 0   Pain Score 0.5   When you awakened in the morning OVER THE LAST WEEK, did you feel stiff? No   Fatigue Score 0.5   Global Health Score 0.5   RAPID3 Score 0.55

## 2019-06-06 NOTE — PROGRESS NOTES
I have reviewed the notes, documentation by  and I agree with the recommendations.    Since the last time :     PFTs all normal  CXR nml    On plaquenil and evoxac    Refuses any more meds    Clinically asymptomatic    So for now just continue current meds and follow up in few months     If she flares again then introduce biologics        Answers for HPI/ROS submitted by the patient on 6/3/2019   fever: No  eye redness: No  headaches: No  shortness of breath: No  chest pain: No  trouble swallowing: No  diarrhea: No  constipation: Yes  unexpected weight change: No  genital sore: No  dysuria: No  During the last 3 days, have you had a skin rash?: No  Bruises or bleeds easily: No  cough: No

## 2019-06-06 NOTE — PROGRESS NOTES
"Subjective:       Patient ID: Urmila Martínez is a 74 y.o. female.    Chief Complaint: Follow-up    HPI  73 y/o woman with Sjogren's, inflammatory arthritis, OA, osteopenia, h/o nephrolithiasis here for visit to review paperwork for assisted living facility.    Paperwork for admission to [facility] - planning to move there on 7/9/19; her  will be in the assisted living section of that community and she is planning to live with him there.    Chronic hand/wrist pain, Sjogrens, h/o inflammatory arthritis, OA - Follows with Dr Figueroa for this  Chronic constipation - sounds like this can be associated with the Sjogrens. Has had normal BM recently with miralax.    Osteopenia - Previous DEXA 10/2015, 2011. Took hormone therapy for 1 year postmenopausal (early menopause around age 38), stopped due to concerns about risks. Also took fosamax for about 2 years after 2011. DEXA done 10/30/17: "Osteopenia of lumbar spine, total hip, and femoral neck. There has been no significant change from the previous study.    Review of Systems   Constitutional: Positive for activity change. Negative for unexpected weight change.   HENT: Negative for hearing loss, rhinorrhea and trouble swallowing.    Eyes: Negative for discharge and visual disturbance.   Respiratory: Negative for cough, chest tightness, shortness of breath and wheezing.    Cardiovascular: Negative for chest pain and palpitations.   Gastrointestinal: Positive for constipation. Negative for abdominal pain, blood in stool, diarrhea and vomiting.   Endocrine: Negative for polydipsia and polyuria.   Genitourinary: Negative for difficulty urinating, dysuria, hematuria and menstrual problem.   Musculoskeletal: Positive for arthralgias. Negative for joint swelling and neck pain.   Skin: Negative.    Neurological: Negative for weakness and headaches.   Psychiatric/Behavioral: Negative for confusion and dysphoric mood.         Past medical history, " "surgical history, and family medical history reviewed and updated as appropriate.    Medications and allergies reviewed.     Objective:          Vitals:    06/06/19 1538   BP: 126/74   BP Location: Right arm   Patient Position: Sitting   BP Method: Large (Manual)   Pulse: 72   SpO2: 96%   Weight: 78.5 kg (173 lb 1 oz)   Height: 5' 5" (1.651 m)     Body mass index is 28.8 kg/m².  Physical Exam   Constitutional: She is oriented to person, place, and time. She appears well-developed and well-nourished. No distress.   HENT:   Mouth/Throat: Oropharynx is clear and moist.   Eyes: Conjunctivae and EOM are normal.   Neck: Neck supple.   Cardiovascular: Normal rate, regular rhythm and normal heart sounds.   Pulmonary/Chest: Effort normal. No respiratory distress.   Abdominal: Soft.   Musculoskeletal: She exhibits no edema.   Neurological: She is alert and oriented to person, place, and time. No cranial nerve deficit. Gait normal.   Skin: Skin is warm and dry.   Psychiatric: She has a normal mood and affect.   Vitals reviewed.      Lab Results   Component Value Date    WBC 6.16 05/27/2019    HGB 13.1 05/27/2019    HCT 39.6 05/27/2019     05/27/2019    CHOL 160 07/17/2018    TRIG 113 07/17/2018    HDL 44 07/17/2018    ALT 13 05/27/2019    AST 19 05/27/2019     05/27/2019    K 4.5 05/27/2019     05/27/2019    CREATININE 0.8 05/27/2019    BUN 12 05/27/2019    CO2 29 05/27/2019    TSH 1.690 04/09/2019       Assessment:       1. Sjogren's syndrome, with unspecified organ involvement    2. Aortic atherosclerosis    3. Rheumatoid arthritis involving multiple sites, unspecified rheumatoid factor presence    4. Osteopenia determined by x-ray    5. Tuberculosis screening        Plan:   Urmila was seen today for follow-up.    Diagnoses and all orders for this visit:    Sjogren's syndrome, with unspecified organ involvement    Aortic atherosclerosis    Rheumatoid arthritis involving multiple sites, unspecified " rheumatoid factor presence    Osteopenia determined by x-ray    Tuberculosis screening  -     X-Ray Chest PA And Lateral; Future  -     POCT TB Skin Test Read; Future    Form reviewed with patient and completed in detail today, other than pending PPD and CXR.  Will fax form back TB screening listed as pending, and then will forward those results when they are complete.    Follow up in about 2 months (around 8/15/2019) for annual physical.    Jonathan Garcia MD  Internal Medicine  [facility]  6/6/2019    35 minutes spent with patient with >50% of visit spent counseling patient regarding conditions documented above and planning for care coordination. All questions answered.

## 2019-06-08 ENCOUNTER — PATIENT MESSAGE (OUTPATIENT)
Dept: RHEUMATOLOGY | Facility: CLINIC | Age: 74
End: 2019-06-08

## 2019-06-10 ENCOUNTER — TELEPHONE (OUTPATIENT)
Dept: RHEUMATOLOGY | Facility: CLINIC | Age: 74
End: 2019-06-10

## 2019-06-10 DIAGNOSIS — M35.01 SJOGREN'S SYNDROME WITH KERATOCONJUNCTIVITIS SICCA: ICD-10-CM

## 2019-06-10 DIAGNOSIS — Z79.899 HIGH RISK MEDICATION USE: ICD-10-CM

## 2019-06-10 DIAGNOSIS — M54.12 CERVICAL RADICULOPATHY: ICD-10-CM

## 2019-06-10 DIAGNOSIS — M06.9 RHEUMATOID ARTHRITIS, INVOLVING UNSPECIFIED SITE, UNSPECIFIED RHEUMATOID FACTOR PRESENCE: ICD-10-CM

## 2019-06-10 DIAGNOSIS — M15.9 PRIMARY OSTEOARTHRITIS INVOLVING MULTIPLE JOINTS: ICD-10-CM

## 2019-06-10 DIAGNOSIS — G56.00 CARPAL TUNNEL SYNDROME, UNSPECIFIED LATERALITY: ICD-10-CM

## 2019-06-10 DIAGNOSIS — M85.80 OSTEOPENIA DETERMINED BY X-RAY: ICD-10-CM

## 2019-06-10 RX ORDER — HYDROXYCHLOROQUINE SULFATE 200 MG/1
TABLET, FILM COATED ORAL
Qty: 180 TABLET | Refills: 0 | Status: SHIPPED | OUTPATIENT
Start: 2019-06-10 | End: 2019-09-16 | Stop reason: SDUPTHER

## 2019-06-10 RX ORDER — CEVIMELINE HYDROCHLORIDE 30 MG/1
30 CAPSULE ORAL 2 TIMES DAILY
Qty: 180 CAPSULE | Refills: 3 | Status: SHIPPED | OUTPATIENT
Start: 2019-06-10 | End: 2020-05-21 | Stop reason: SDUPTHER

## 2019-06-24 ENCOUNTER — HOSPITAL ENCOUNTER (OUTPATIENT)
Dept: RADIOLOGY | Facility: HOSPITAL | Age: 74
Discharge: HOME | End: 2019-06-24
Attending: INTERNAL MEDICINE
Payer: MEDICARE

## 2019-06-24 ENCOUNTER — PATIENT MESSAGE (OUTPATIENT)
Dept: INTERNAL MEDICINE | Facility: CLINIC | Age: 74
End: 2019-06-24

## 2019-06-24 ENCOUNTER — CLINICAL SUPPORT (OUTPATIENT)
Dept: INTERNAL MEDICINE | Facility: CLINIC | Age: 74
End: 2019-06-24
Payer: MEDICARE

## 2019-06-24 DIAGNOSIS — Z11.1 TUBERCULOSIS SCREENING: Primary | ICD-10-CM

## 2019-06-24 DIAGNOSIS — Z11.1 TUBERCULOSIS SCREENING: ICD-10-CM

## 2019-06-24 PROCEDURE — 71046 X-RAY EXAM CHEST 2 VIEWS: CPT | Mod: 26,HCNC,, | Performed by: RADIOLOGY

## 2019-06-24 PROCEDURE — 71046 X-RAY EXAM CHEST 2 VIEWS: CPT | Mod: TC,HCNC

## 2019-06-24 PROCEDURE — 99999 PR PBB SHADOW E&M-EST. PATIENT-LVL I: CPT | Mod: PBBFAC,HCNC,,

## 2019-06-24 PROCEDURE — 71046 XR CHEST PA AND LATERAL: ICD-10-PCS | Mod: 26,HCNC,, | Performed by: RADIOLOGY

## 2019-06-24 PROCEDURE — 86580 TB INTRADERMAL TEST: CPT | Mod: HCNC,S$GLB,, | Performed by: INTERNAL MEDICINE

## 2019-06-24 PROCEDURE — 86580 POCT TB SKIN TEST: ICD-10-PCS | Mod: HCNC,S$GLB,, | Performed by: INTERNAL MEDICINE

## 2019-06-24 PROCEDURE — 99999 PR PBB SHADOW E&M-EST. PATIENT-LVL I: ICD-10-PCS | Mod: PBBFAC,HCNC,,

## 2019-06-24 NOTE — PROGRESS NOTES
Used 2 pt identifiers and reviewed allergies prior to giving 0.1ml of TB skin test injection into Inner aspect of L/arm , VIS given then asked pt to wait 15 mins post injection for s/sx of adverse reactions.

## 2019-06-25 ENCOUNTER — PATIENT MESSAGE (OUTPATIENT)
Dept: INTERNAL MEDICINE | Facility: CLINIC | Age: 74
End: 2019-06-25

## 2019-06-26 ENCOUNTER — CLINICAL SUPPORT (OUTPATIENT)
Dept: INTERNAL MEDICINE | Facility: CLINIC | Age: 74
End: 2019-06-26
Payer: MEDICARE

## 2019-06-26 LAB
TB INDURATION - 48 HR READ: 0 MM
TB INDURATION - 72 HR READ: NORMAL MM
TB SKIN TEST - 48 HR READ: NEGATIVE
TB SKIN TEST - 72 HR READ: NORMAL

## 2019-06-27 ENCOUNTER — TELEPHONE (OUTPATIENT)
Dept: INTERNAL MEDICINE | Facility: CLINIC | Age: 74
End: 2019-06-27

## 2019-06-27 NOTE — TELEPHONE ENCOUNTER
Forms re-faxed:    CXR and TB test came back negative for TB   Please fax these results to [facility] [phone] (see form on desk)

## 2019-08-15 ENCOUNTER — OFFICE VISIT (OUTPATIENT)
Dept: INTERNAL MEDICINE | Facility: CLINIC | Age: 74
End: 2019-08-15
Payer: MEDICARE

## 2019-08-15 VITALS
HEART RATE: 69 BPM | SYSTOLIC BLOOD PRESSURE: 112 MMHG | DIASTOLIC BLOOD PRESSURE: 74 MMHG | HEIGHT: 65 IN | BODY MASS INDEX: 28.61 KG/M2 | WEIGHT: 171.75 LBS

## 2019-08-15 DIAGNOSIS — Z79.899 HIGH RISK MEDICATION USE: ICD-10-CM

## 2019-08-15 DIAGNOSIS — M35.00 SJOGREN'S SYNDROME, WITH UNSPECIFIED ORGAN INVOLVEMENT: ICD-10-CM

## 2019-08-15 DIAGNOSIS — Z80.0 FAMILY HISTORY OF COLON CANCER: ICD-10-CM

## 2019-08-15 DIAGNOSIS — Z12.39 SCREENING FOR MALIGNANT NEOPLASM OF BREAST: ICD-10-CM

## 2019-08-15 DIAGNOSIS — M85.80 OSTEOPENIA DETERMINED BY X-RAY: ICD-10-CM

## 2019-08-15 DIAGNOSIS — M15.9 PRIMARY OSTEOARTHRITIS INVOLVING MULTIPLE JOINTS: ICD-10-CM

## 2019-08-15 DIAGNOSIS — H35.372 EPIRETINAL MEMBRANE, LEFT EYE: ICD-10-CM

## 2019-08-15 DIAGNOSIS — Z00.00 ANNUAL PHYSICAL EXAM: Primary | ICD-10-CM

## 2019-08-15 DIAGNOSIS — Z78.0 POSTMENOPAUSAL: ICD-10-CM

## 2019-08-15 DIAGNOSIS — I70.0 AORTIC ATHEROSCLEROSIS: ICD-10-CM

## 2019-08-15 PROCEDURE — 99397 PR PREVENTIVE VISIT,EST,65 & OVER: ICD-10-PCS | Mod: HCNC,S$GLB,, | Performed by: INTERNAL MEDICINE

## 2019-08-15 PROCEDURE — 99397 PER PM REEVAL EST PAT 65+ YR: CPT | Mod: HCNC,S$GLB,, | Performed by: INTERNAL MEDICINE

## 2019-08-15 PROCEDURE — 3078F DIAST BP <80 MM HG: CPT | Mod: HCNC,CPTII,S$GLB, | Performed by: INTERNAL MEDICINE

## 2019-08-15 PROCEDURE — 99999 PR PBB SHADOW E&M-EST. PATIENT-LVL IV: ICD-10-PCS | Mod: PBBFAC,HCNC,, | Performed by: INTERNAL MEDICINE

## 2019-08-15 PROCEDURE — 3078F PR MOST RECENT DIASTOLIC BLOOD PRESSURE < 80 MM HG: ICD-10-PCS | Mod: HCNC,CPTII,S$GLB, | Performed by: INTERNAL MEDICINE

## 2019-08-15 PROCEDURE — 3074F PR MOST RECENT SYSTOLIC BLOOD PRESSURE < 130 MM HG: ICD-10-PCS | Mod: HCNC,CPTII,S$GLB, | Performed by: INTERNAL MEDICINE

## 2019-08-15 PROCEDURE — 3074F SYST BP LT 130 MM HG: CPT | Mod: HCNC,CPTII,S$GLB, | Performed by: INTERNAL MEDICINE

## 2019-08-15 PROCEDURE — 99999 PR PBB SHADOW E&M-EST. PATIENT-LVL IV: CPT | Mod: PBBFAC,HCNC,, | Performed by: INTERNAL MEDICINE

## 2019-08-15 NOTE — PATIENT INSTRUCTIONS
Check with your dentist to see if you should stop taking the fosamax for some time around when you have your dental procedure.    Schedule with Dr Martínez for you well woman exam / pap test.    When you are able, schedule with Dr Gonzalez for your eye exam.    Get your flu vaccine in September    There is a new shingles vaccine available (Shingrix) that is both safer and more effective than the old shingles vaccine (Zostavax). I do recommend that you get this, as it can help prevent a shingles outbreak even if you have had one in the past.   For this vaccine, you will need a series of 2 shots, first one and then a second 2 to 6 months later.  Please check in with the [facility] or with your local pharmacy about getting this vaccine; they should be able to check on insurance coverage and whether there is any cost to you.

## 2019-08-15 NOTE — Clinical Note
Please contact patient to schedule for DEXA bone density scan -- if desired, she could do this same day as her mammogram. Sometime after 10/21/19

## 2019-08-15 NOTE — PROGRESS NOTES
"Subjective:       Patient ID: Urmila DAN White is a 74 y.o. female.    Chief Complaint: Annual Exam    HPI  73 y/o woman with Sjogren's, inflammatory arthritis, OA, osteopenia, h/o nephrolithiasis here for annual exam.    Currently on amoxicillin x 10 days for dental problem, may need root canal.  Had flare of joint pain for 3 days after starting this, then this resolved    Chronic hand/wrist pain, Sjogrens, h/o inflammatory arthritis, OA - Follows with Dr Figueroa for this; saw Dr Li 6/2019, will see Dr Figueroa again later this month.  Chronic constipation - sounds like this can be associated with the Sjogrens; this is doing better. Variable BM. Following high-fiber diet, plenty of water. No longer taking miralax.  On plaquenil and evoxac, +meloxicam 15mg as needed  Tried methotrexate but stopped as she was feeling short of breath  Has had normal PFTs and CXR  Has followed with Dr Gonzalez yearly for epiretinal membrane (not related to plaquenil)- last exam 6/13/17.   Following again with Dr Gross (listed under pediatric optometry) for plaquenil eye exam monitoring, seen 5/8/19    Osteopenia - Previous DEXA 10/2015, 2011. Took hormone therapy for 1 year postmenopausal (early menopause around age 38), stopped due to concerns about risks. Also took fosamax for about 2 years after 2011. DEXA done 10/30/17: "Osteopenia of lumbar spine, total hip, and femoral neck. There has been no significant change from the previous study."  Back on fosamax since 12/2017  Due for next DEXA at end of this year    GERD - no problems recently, previously taking medication but not recently    Aortic atherosclerosis - Takes ASA 4 times/week; too much bruising if taking daily. Now taking atorvastatin, doing fine on this.    Using fluticasone nasal spray as needed.    Strong family history of colon cancer (both parents), also aunt with stomach cancer, another aunt with thyroid cancer. Grandmother with breast cancer.  Brother and mother both " "with CAD, mother had CABG in her 50s.  Saw [facility] 8/2017 for family history of Fabry disease (paternal cousin).    Gets colonoscopies regularly, did have 2 polyps previously that were nonmalignant, gets c-scope every 5 years - last in 2015, due in 2020  Follows with Dr Martínez for pap/Gyn, no h/o abnormal Pap in the past, last visit 10/2016, due for follow up.  Mammogram 11/2018    Review of Systems   Constitutional: Positive for activity change. Negative for unexpected weight change.   HENT: Negative for hearing loss, rhinorrhea and trouble swallowing.    Eyes: Negative for discharge and visual disturbance.   Respiratory: Negative for chest tightness and wheezing.    Cardiovascular: Negative for chest pain and palpitations.   Gastrointestinal: Positive for constipation. Negative for blood in stool, diarrhea and vomiting.   Endocrine: Negative for polydipsia and polyuria.   Genitourinary: Negative for difficulty urinating, dysuria, hematuria and menstrual problem.   Musculoskeletal: Positive for arthralgias and joint swelling. Negative for neck pain.   Neurological: Negative for weakness and headaches.   Psychiatric/Behavioral: Negative for confusion and dysphoric mood.         Past medical history, surgical history, and family medical history reviewed and updated as appropriate.    Medications and allergies reviewed.     Objective:          Vitals:    08/15/19 1515   BP: 112/74   Pulse: 69   Weight: 77.9 kg (171 lb 11.8 oz)   Height: 5' 5" (1.651 m)     Body mass index is 28.58 kg/m².  Physical Exam   Constitutional: She is oriented to person, place, and time. She appears well-developed and well-nourished. No distress.   HENT:   Nose: Nose normal.   Mouth/Throat: Oropharynx is clear and moist.   Eyes: Pupils are equal, round, and reactive to light. Conjunctivae and EOM are normal.   Neck: Neck supple. No thyromegaly present.   Cardiovascular: Normal rate, regular rhythm, normal heart sounds and intact distal " pulses.   No murmur heard.  Pulmonary/Chest: Effort normal and breath sounds normal. No respiratory distress.   Abdominal: Soft. Bowel sounds are normal. There is no tenderness.   Musculoskeletal: She exhibits no edema or tenderness.   Lymphadenopathy:     She has no cervical adenopathy.   Neurological: She is alert and oriented to person, place, and time. No cranial nerve deficit. Gait normal.   Skin: Skin is warm and dry. No erythema.   Psychiatric: She has a normal mood and affect. Her behavior is normal.   Vitals reviewed.      Lab Results   Component Value Date    WBC 6.16 05/27/2019    HGB 13.1 05/27/2019    HCT 39.6 05/27/2019     05/27/2019    CHOL 160 07/17/2018    TRIG 113 07/17/2018    HDL 44 07/17/2018    ALT 13 05/27/2019    AST 19 05/27/2019     05/27/2019    K 4.5 05/27/2019     05/27/2019    CREATININE 0.8 05/27/2019    BUN 12 05/27/2019    CO2 29 05/27/2019    TSH 1.690 04/09/2019       Assessment:       1. Annual physical exam    2. Screening for malignant neoplasm of breast    3. Sjogren's syndrome, with unspecified organ involvement    4. High risk medication use    5. Primary osteoarthritis involving multiple joints    6. Osteopenia determined by x-ray    7. Aortic atherosclerosis    8. Family history of colon cancer    9. Epiretinal membrane, left eye        Plan:   Urmila was seen today for annual exam.    Diagnoses and all orders for this visit:    Annual physical exam - Overall stable, doing well. Reviewed chronic and preventive health concerns.  Finishing process of moving into assisted living community with her ; she is primary caregiver for him. Feels this is helpful.     Screening for malignant neoplasm of breast  -     Mammo Digital Screening Bilat; Future    Sjogren's syndrome, with unspecified organ involvement  High risk medication use  Primary osteoarthritis involving multiple joints  Reviewed specialist notes, labs, imaging. No changes to medication  today  Reviewed plans for monitoring related to plaquenil, h/o retina issues    Osteopenia determined by x-ray - stable, reviewed imaging. Continue fosamax. Check DEXA later this year  Also recommended check with dentist re: needing to hold fosamax around time of dental procedure.    Aortic atherosclerosis - continue ASA, statin    Family history of colon cancer - reviewed, up to date on screening, due next year    Epiretinal membrane, left eye - follow with Ophtho as noted    Health maintenance reviewed with patient.   Reminded to schedule for Gyn / pap  Reminded to schedule with Dr Gonzalez  Reminded re: shingrix, flu vaccine  DEXA in October  Mammogram in November    Follow up in about 6 months (around 2/15/2020) for coordination of care.    Jonathan Garcia MD  Internal Medicine  [facility]  8/15/2019

## 2019-08-17 ENCOUNTER — PATIENT MESSAGE (OUTPATIENT)
Dept: INTERNAL MEDICINE | Facility: CLINIC | Age: 74
End: 2019-08-17

## 2019-08-19 ENCOUNTER — PATIENT MESSAGE (OUTPATIENT)
Dept: INTERNAL MEDICINE | Facility: CLINIC | Age: 74
End: 2019-08-19

## 2019-08-26 ENCOUNTER — TELEPHONE (OUTPATIENT)
Dept: INTERNAL MEDICINE | Facility: CLINIC | Age: 74
End: 2019-08-26

## 2019-08-26 PROBLEM — R06.00 DYSPNEA: Status: RESOLVED | Noted: 2019-05-06 | Resolved: 2019-08-26

## 2019-08-26 NOTE — TELEPHONE ENCOUNTER
----- Message from Jonathan Garcia MD sent at 8/26/2019  1:17 AM CDT -----  Please contact patient to schedule for DEXA bone density scan -- if desired, she could do this same day as her mammogram. Sometime after 10/21/19

## 2019-08-27 ENCOUNTER — OFFICE VISIT (OUTPATIENT)
Dept: OPHTHALMOLOGY | Facility: CLINIC | Age: 74
End: 2019-08-27
Payer: MEDICARE

## 2019-08-27 VITALS — DIASTOLIC BLOOD PRESSURE: 73 MMHG | SYSTOLIC BLOOD PRESSURE: 126 MMHG | HEART RATE: 72 BPM

## 2019-08-27 DIAGNOSIS — Z79.899 HIGH RISK MEDICATION USE: ICD-10-CM

## 2019-08-27 DIAGNOSIS — H35.372 EPIRETINAL MEMBRANE, LEFT EYE: Primary | ICD-10-CM

## 2019-08-27 DIAGNOSIS — M35.01 SJOGREN'S SYNDROME WITH KERATOCONJUNCTIVITIS SICCA: ICD-10-CM

## 2019-08-27 PROCEDURE — 92014 COMPRE OPH EXAM EST PT 1/>: CPT | Mod: HCNC,S$GLB,, | Performed by: OPHTHALMOLOGY

## 2019-08-27 PROCEDURE — 92134 POSTERIOR SEGMENT OCT RETINA (OCULAR COHERENCE TOMOGRAPHY)-BOTH EYES: ICD-10-PCS | Mod: HCNC,S$GLB,, | Performed by: OPHTHALMOLOGY

## 2019-08-27 PROCEDURE — 92226 PR SPECIAL EYE EXAM, SUBSEQUENT: ICD-10-PCS | Mod: HCNC,LT,S$GLB, | Performed by: OPHTHALMOLOGY

## 2019-08-27 PROCEDURE — 99999 PR PBB SHADOW E&M-EST. PATIENT-LVL III: CPT | Mod: PBBFAC,HCNC,, | Performed by: OPHTHALMOLOGY

## 2019-08-27 PROCEDURE — 92226 PR SPECIAL EYE EXAM, SUBSEQUENT: CPT | Mod: HCNC,LT,S$GLB, | Performed by: OPHTHALMOLOGY

## 2019-08-27 PROCEDURE — 99999 PR PBB SHADOW E&M-EST. PATIENT-LVL III: ICD-10-PCS | Mod: PBBFAC,HCNC,, | Performed by: OPHTHALMOLOGY

## 2019-08-27 PROCEDURE — 92134 CPTRZ OPH DX IMG PST SGM RTA: CPT | Mod: HCNC,S$GLB,, | Performed by: OPHTHALMOLOGY

## 2019-08-27 PROCEDURE — 92014 PR EYE EXAM, EST PATIENT,COMPREHESV: ICD-10-PCS | Mod: HCNC,S$GLB,, | Performed by: OPHTHALMOLOGY

## 2019-08-27 NOTE — PROGRESS NOTES
HPI     DLS  06/13/2017    HX:  Epiretinal Membrane.    C/o vision currently stable      Last edited by Duane Rodríguez on 8/27/2019  9:37 AM. (History)        OCT - ERM OS - minimal foveal traction; unchanged from previous exam    A/P    1.  Epiretinal membrane, left eye - not related to plaquenil         2.  Nuclear sclerosis - not visually significant        3.  Sicca syndrome without dry eye symptoms         4.  High-risk medication - Plaquenil use for Sjrogen's syndrome since 2014. - check OCT        1.  Yr. OCT

## 2019-08-30 ENCOUNTER — PATIENT MESSAGE (OUTPATIENT)
Dept: INTERNAL MEDICINE | Facility: CLINIC | Age: 74
End: 2019-08-30

## 2019-08-30 DIAGNOSIS — I70.0 AORTIC ATHEROSCLEROSIS: ICD-10-CM

## 2019-08-30 DIAGNOSIS — M85.80 OSTEOPENIA DETERMINED BY X-RAY: ICD-10-CM

## 2019-08-30 DIAGNOSIS — Z91.89 AT HIGH RISK FOR OSTEOPOROSIS: ICD-10-CM

## 2019-08-30 RX ORDER — ATORVASTATIN CALCIUM 20 MG/1
TABLET, FILM COATED ORAL
Qty: 90 TABLET | Refills: 3 | Status: SHIPPED | OUTPATIENT
Start: 2019-08-30 | End: 2019-08-31 | Stop reason: SDUPTHER

## 2019-09-02 RX ORDER — ATORVASTATIN CALCIUM 20 MG/1
20 TABLET, FILM COATED ORAL DAILY
Qty: 90 TABLET | Refills: 3 | Status: SHIPPED | OUTPATIENT
Start: 2019-09-02 | End: 2020-08-11 | Stop reason: SDUPTHER

## 2019-09-02 RX ORDER — ALENDRONATE SODIUM 70 MG/1
70 TABLET ORAL
Qty: 12 TABLET | Refills: 3 | Status: SHIPPED | OUTPATIENT
Start: 2019-09-02 | End: 2020-03-04 | Stop reason: SDUPTHER

## 2019-09-09 ENCOUNTER — PATIENT MESSAGE (OUTPATIENT)
Dept: INTERNAL MEDICINE | Facility: CLINIC | Age: 74
End: 2019-09-09

## 2019-09-16 DIAGNOSIS — M15.9 PRIMARY OSTEOARTHRITIS INVOLVING MULTIPLE JOINTS: ICD-10-CM

## 2019-09-16 DIAGNOSIS — M06.9 RHEUMATOID ARTHRITIS, INVOLVING UNSPECIFIED SITE, UNSPECIFIED RHEUMATOID FACTOR PRESENCE: ICD-10-CM

## 2019-09-16 DIAGNOSIS — M85.80 OSTEOPENIA DETERMINED BY X-RAY: ICD-10-CM

## 2019-09-16 DIAGNOSIS — M54.12 CERVICAL RADICULOPATHY: ICD-10-CM

## 2019-09-16 DIAGNOSIS — Z79.899 HIGH RISK MEDICATION USE: ICD-10-CM

## 2019-09-16 DIAGNOSIS — M35.01 SJOGREN'S SYNDROME WITH KERATOCONJUNCTIVITIS SICCA: ICD-10-CM

## 2019-09-16 DIAGNOSIS — G56.00 CARPAL TUNNEL SYNDROME, UNSPECIFIED LATERALITY: ICD-10-CM

## 2019-09-16 RX ORDER — HYDROXYCHLOROQUINE SULFATE 200 MG/1
TABLET, FILM COATED ORAL
Qty: 180 TABLET | Refills: 0 | Status: SHIPPED | OUTPATIENT
Start: 2019-09-16 | End: 2019-12-05 | Stop reason: SDUPTHER

## 2019-09-18 ENCOUNTER — PATIENT OUTREACH (OUTPATIENT)
Dept: ADMINISTRATIVE | Facility: HOSPITAL | Age: 74
End: 2019-09-18

## 2019-10-07 ENCOUNTER — LAB VISIT (OUTPATIENT)
Dept: LAB | Facility: HOSPITAL | Age: 74
End: 2019-10-07
Attending: INTERNAL MEDICINE
Payer: MEDICARE

## 2019-10-07 DIAGNOSIS — M15.9 PRIMARY OSTEOARTHRITIS INVOLVING MULTIPLE JOINTS: ICD-10-CM

## 2019-10-07 DIAGNOSIS — M35.00 SJOGREN'S SYNDROME, WITH UNSPECIFIED ORGAN INVOLVEMENT: ICD-10-CM

## 2019-10-07 DIAGNOSIS — M54.12 CERVICAL RADICULOPATHY: ICD-10-CM

## 2019-10-07 DIAGNOSIS — M06.9 RHEUMATOID ARTHRITIS INVOLVING MULTIPLE SITES, UNSPECIFIED RHEUMATOID FACTOR PRESENCE: ICD-10-CM

## 2019-10-07 DIAGNOSIS — M85.80 OSTEOPENIA DETERMINED BY X-RAY: ICD-10-CM

## 2019-10-07 DIAGNOSIS — G56.00 CARPAL TUNNEL SYNDROME, UNSPECIFIED LATERALITY: ICD-10-CM

## 2019-10-07 LAB
ALBUMIN SERPL BCP-MCNC: 4 G/DL (ref 3.5–5.2)
ALP SERPL-CCNC: 64 U/L (ref 55–135)
ALT SERPL W/O P-5'-P-CCNC: 13 U/L (ref 10–44)
ANION GAP SERPL CALC-SCNC: 8 MMOL/L (ref 8–16)
AST SERPL-CCNC: 21 U/L (ref 10–40)
BASOPHILS # BLD AUTO: 0.08 K/UL (ref 0–0.2)
BASOPHILS NFR BLD: 1.4 % (ref 0–1.9)
BILIRUB SERPL-MCNC: 0.9 MG/DL (ref 0.1–1)
BUN SERPL-MCNC: 16 MG/DL (ref 8–23)
CALCIUM SERPL-MCNC: 9.4 MG/DL (ref 8.7–10.5)
CHLORIDE SERPL-SCNC: 108 MMOL/L (ref 95–110)
CO2 SERPL-SCNC: 27 MMOL/L (ref 23–29)
CREAT SERPL-MCNC: 0.9 MG/DL (ref 0.5–1.4)
CRP SERPL-MCNC: 1.5 MG/L (ref 0–8.2)
DIFFERENTIAL METHOD: NORMAL
EOSINOPHIL # BLD AUTO: 0.1 K/UL (ref 0–0.5)
EOSINOPHIL NFR BLD: 1.4 % (ref 0–8)
ERYTHROCYTE [DISTWIDTH] IN BLOOD BY AUTOMATED COUNT: 12.3 % (ref 11.5–14.5)
ERYTHROCYTE [SEDIMENTATION RATE] IN BLOOD BY WESTERGREN METHOD: 6 MM/HR (ref 0–36)
EST. GFR  (AFRICAN AMERICAN): >60 ML/MIN/1.73 M^2
EST. GFR  (NON AFRICAN AMERICAN): >60 ML/MIN/1.73 M^2
GLUCOSE SERPL-MCNC: 96 MG/DL (ref 70–110)
HCT VFR BLD AUTO: 39.5 % (ref 37–48.5)
HGB BLD-MCNC: 13.2 G/DL (ref 12–16)
LYMPHOCYTES # BLD AUTO: 1.3 K/UL (ref 1–4.8)
LYMPHOCYTES NFR BLD: 23 % (ref 18–48)
MCH RBC QN AUTO: 30.3 PG (ref 27–31)
MCHC RBC AUTO-ENTMCNC: 33.4 G/DL (ref 32–36)
MCV RBC AUTO: 91 FL (ref 82–98)
MONOCYTES # BLD AUTO: 0.4 K/UL (ref 0.3–1)
MONOCYTES NFR BLD: 7.8 % (ref 4–15)
NEUTROPHILS # BLD AUTO: 3.8 K/UL (ref 1.8–7.7)
NEUTROPHILS NFR BLD: 66.4 % (ref 38–73)
PLATELET # BLD AUTO: 206 K/UL (ref 150–350)
PMV BLD AUTO: 10.5 FL (ref 9.2–12.9)
POTASSIUM SERPL-SCNC: 4.6 MMOL/L (ref 3.5–5.1)
PROT SERPL-MCNC: 7.4 G/DL (ref 6–8.4)
RBC # BLD AUTO: 4.36 M/UL (ref 4–5.4)
SODIUM SERPL-SCNC: 143 MMOL/L (ref 136–145)
WBC # BLD AUTO: 5.66 K/UL (ref 3.9–12.7)

## 2019-10-07 PROCEDURE — 80053 COMPREHEN METABOLIC PANEL: CPT | Mod: HCNC

## 2019-10-07 PROCEDURE — 85025 COMPLETE CBC W/AUTO DIFF WBC: CPT | Mod: HCNC

## 2019-10-07 PROCEDURE — 86140 C-REACTIVE PROTEIN: CPT | Mod: HCNC

## 2019-10-07 PROCEDURE — 36415 COLL VENOUS BLD VENIPUNCTURE: CPT | Mod: HCNC

## 2019-10-07 PROCEDURE — 85652 RBC SED RATE AUTOMATED: CPT | Mod: HCNC

## 2019-11-01 ENCOUNTER — HOSPITAL ENCOUNTER (OUTPATIENT)
Dept: RADIOLOGY | Facility: CLINIC | Age: 74
Discharge: HOME | End: 2019-11-01
Attending: INTERNAL MEDICINE
Payer: MEDICARE

## 2019-11-01 DIAGNOSIS — Z78.0 POSTMENOPAUSAL: ICD-10-CM

## 2019-11-01 DIAGNOSIS — M85.80 OSTEOPENIA DETERMINED BY X-RAY: ICD-10-CM

## 2019-11-01 PROCEDURE — 77080 DXA BONE DENSITY AXIAL: CPT | Mod: TC,HCNC

## 2019-11-01 PROCEDURE — 77080 DEXA BONE DENSITY SPINE HIP: ICD-10-PCS | Mod: 26,HCNC,, | Performed by: INTERNAL MEDICINE

## 2019-11-01 PROCEDURE — 77080 DXA BONE DENSITY AXIAL: CPT | Mod: 26,HCNC,, | Performed by: INTERNAL MEDICINE

## 2019-11-05 ENCOUNTER — HOSPITAL ENCOUNTER (OUTPATIENT)
Dept: RADIOLOGY | Facility: HOSPITAL | Age: 74
Discharge: HOME | End: 2019-11-05
Attending: INTERNAL MEDICINE
Payer: MEDICARE

## 2019-11-05 DIAGNOSIS — Z12.39 SCREENING FOR MALIGNANT NEOPLASM OF BREAST: ICD-10-CM

## 2019-11-05 PROCEDURE — 77063 BREAST TOMOSYNTHESIS BI: CPT | Mod: 26,HCNC,, | Performed by: RADIOLOGY

## 2019-11-05 PROCEDURE — 77067 MAMMO DIGITAL SCREENING BILAT WITH TOMOSYNTHESIS_CAD: ICD-10-PCS | Mod: 26,HCNC,, | Performed by: RADIOLOGY

## 2019-11-05 PROCEDURE — 77063 MAMMO DIGITAL SCREENING BILAT WITH TOMOSYNTHESIS_CAD: ICD-10-PCS | Mod: 26,HCNC,, | Performed by: RADIOLOGY

## 2019-11-05 PROCEDURE — 77067 SCR MAMMO BI INCL CAD: CPT | Mod: 26,HCNC,, | Performed by: RADIOLOGY

## 2019-11-05 PROCEDURE — 77067 SCR MAMMO BI INCL CAD: CPT | Mod: TC,HCNC

## 2019-11-11 ENCOUNTER — PATIENT MESSAGE (OUTPATIENT)
Dept: INTERNAL MEDICINE | Facility: CLINIC | Age: 74
End: 2019-11-11

## 2019-11-14 ENCOUNTER — TELEPHONE (OUTPATIENT)
Dept: OBSTETRICS AND GYNECOLOGY | Facility: CLINIC | Age: 74
End: 2019-11-14

## 2019-11-14 ENCOUNTER — OFFICE VISIT (OUTPATIENT)
Dept: OBSTETRICS AND GYNECOLOGY | Facility: CLINIC | Age: 74
End: 2019-11-14
Payer: MEDICARE

## 2019-11-14 VITALS
SYSTOLIC BLOOD PRESSURE: 134 MMHG | DIASTOLIC BLOOD PRESSURE: 79 MMHG | BODY MASS INDEX: 28.77 KG/M2 | WEIGHT: 172.69 LBS | HEIGHT: 65 IN

## 2019-11-14 DIAGNOSIS — Z01.419 WELL WOMAN EXAM WITH ROUTINE GYNECOLOGICAL EXAM: Primary | ICD-10-CM

## 2019-11-14 DIAGNOSIS — Z78.0 POSTMENOPAUSE: ICD-10-CM

## 2019-11-14 PROCEDURE — G0101 CA SCREEN;PELVIC/BREAST EXAM: HCPCS | Mod: HCNC,S$GLB,, | Performed by: NURSE PRACTITIONER

## 2019-11-14 PROCEDURE — G0101 PR CA SCREEN;PELVIC/BREAST EXAM: ICD-10-PCS | Mod: HCNC,S$GLB,, | Performed by: NURSE PRACTITIONER

## 2019-11-14 PROCEDURE — 99999 PR PBB SHADOW E&M-EST. PATIENT-LVL III: ICD-10-PCS | Mod: PBBFAC,HCNC,, | Performed by: NURSE PRACTITIONER

## 2019-11-14 PROCEDURE — 99999 PR PBB SHADOW E&M-EST. PATIENT-LVL III: CPT | Mod: PBBFAC,HCNC,, | Performed by: NURSE PRACTITIONER
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
## 2019-11-20 ENCOUNTER — PATIENT MESSAGE (OUTPATIENT)
Dept: RHEUMATOLOGY | Facility: CLINIC | Age: 74
End: 2019-11-20

## 2019-11-21 ENCOUNTER — PATIENT MESSAGE (OUTPATIENT)
Dept: RHEUMATOLOGY | Facility: CLINIC | Age: 74
End: 2019-11-21

## 2019-11-21 ENCOUNTER — TELEPHONE (OUTPATIENT)
Dept: RHEUMATOLOGY | Facility: CLINIC | Age: 74
End: 2019-11-21

## 2019-11-22 ENCOUNTER — HOSPITAL ENCOUNTER (OUTPATIENT)
Dept: RADIOLOGY | Facility: HOSPITAL | Age: 74
Discharge: HOME | End: 2019-11-22
Attending: INTERNAL MEDICINE
Payer: MEDICARE

## 2019-11-22 ENCOUNTER — OFFICE VISIT (OUTPATIENT)
Dept: RHEUMATOLOGY | Facility: CLINIC | Age: 74
End: 2019-11-22
Payer: MEDICARE

## 2019-11-22 VITALS
HEIGHT: 65 IN | SYSTOLIC BLOOD PRESSURE: 142 MMHG | DIASTOLIC BLOOD PRESSURE: 75 MMHG | HEART RATE: 66 BPM | BODY MASS INDEX: 29.38 KG/M2 | WEIGHT: 176.38 LBS

## 2019-11-22 DIAGNOSIS — M35.01 SJOGREN'S SYNDROME WITH KERATOCONJUNCTIVITIS SICCA: ICD-10-CM

## 2019-11-22 DIAGNOSIS — M06.9 RHEUMATOID ARTHRITIS, INVOLVING UNSPECIFIED SITE, UNSPECIFIED RHEUMATOID FACTOR PRESENCE: ICD-10-CM

## 2019-11-22 DIAGNOSIS — M15.9 PRIMARY OSTEOARTHRITIS INVOLVING MULTIPLE JOINTS: ICD-10-CM

## 2019-11-22 DIAGNOSIS — M06.9 RHEUMATOID ARTHRITIS, INVOLVING UNSPECIFIED SITE, UNSPECIFIED RHEUMATOID FACTOR PRESENCE: Primary | ICD-10-CM

## 2019-11-22 PROCEDURE — 1159F MED LIST DOCD IN RCRD: CPT | Mod: HCNC,S$GLB,, | Performed by: INTERNAL MEDICINE

## 2019-11-22 PROCEDURE — 1101F PT FALLS ASSESS-DOCD LE1/YR: CPT | Mod: HCNC,CPTII,S$GLB, | Performed by: INTERNAL MEDICINE

## 2019-11-22 PROCEDURE — 73130 XR HAND COMPLETE 3 VIEWS BILATERAL: ICD-10-PCS | Mod: 26,50,HCNC, | Performed by: RADIOLOGY

## 2019-11-22 PROCEDURE — 1125F PR PAIN SEVERITY QUANTIFIED, PAIN PRESENT: ICD-10-PCS | Mod: HCNC,S$GLB,, | Performed by: INTERNAL MEDICINE

## 2019-11-22 PROCEDURE — 1125F AMNT PAIN NOTED PAIN PRSNT: CPT | Mod: HCNC,S$GLB,, | Performed by: INTERNAL MEDICINE

## 2019-11-22 PROCEDURE — 3077F PR MOST RECENT SYSTOLIC BLOOD PRESSURE >= 140 MM HG: ICD-10-PCS | Mod: HCNC,CPTII,S$GLB, | Performed by: INTERNAL MEDICINE

## 2019-11-22 PROCEDURE — 99999 PR PBB SHADOW E&M-EST. PATIENT-LVL III: ICD-10-PCS | Mod: PBBFAC,HCNC,, | Performed by: INTERNAL MEDICINE

## 2019-11-22 PROCEDURE — 1101F PR PT FALLS ASSESS DOC 0-1 FALLS W/OUT INJ PAST YR: ICD-10-PCS | Mod: HCNC,CPTII,S$GLB, | Performed by: INTERNAL MEDICINE

## 2019-11-22 PROCEDURE — 1159F PR MEDICATION LIST DOCUMENTED IN MEDICAL RECORD: ICD-10-PCS | Mod: HCNC,S$GLB,, | Performed by: INTERNAL MEDICINE

## 2019-11-22 PROCEDURE — 73630 X-RAY EXAM OF FOOT: CPT | Mod: 26,50,HCNC, | Performed by: RADIOLOGY

## 2019-11-22 PROCEDURE — 73130 X-RAY EXAM OF HAND: CPT | Mod: 26,50,HCNC, | Performed by: RADIOLOGY

## 2019-11-22 PROCEDURE — 73130 X-RAY EXAM OF HAND: CPT | Mod: 50,TC,HCNC

## 2019-11-22 PROCEDURE — 73630 X-RAY EXAM OF FOOT: CPT | Mod: 50,TC,HCNC

## 2019-11-22 PROCEDURE — 3078F DIAST BP <80 MM HG: CPT | Mod: HCNC,CPTII,S$GLB, | Performed by: INTERNAL MEDICINE

## 2019-11-22 PROCEDURE — 99214 OFFICE O/P EST MOD 30 MIN: CPT | Mod: HCNC,S$GLB,, | Performed by: INTERNAL MEDICINE

## 2019-11-22 PROCEDURE — 3078F PR MOST RECENT DIASTOLIC BLOOD PRESSURE < 80 MM HG: ICD-10-PCS | Mod: HCNC,CPTII,S$GLB, | Performed by: INTERNAL MEDICINE

## 2019-11-22 PROCEDURE — 99999 PR PBB SHADOW E&M-EST. PATIENT-LVL III: CPT | Mod: PBBFAC,HCNC,, | Performed by: INTERNAL MEDICINE

## 2019-11-22 PROCEDURE — 99214 PR OFFICE/OUTPT VISIT, EST, LEVL IV, 30-39 MIN: ICD-10-PCS | Mod: HCNC,S$GLB,, | Performed by: INTERNAL MEDICINE

## 2019-11-22 PROCEDURE — 73630 XR FOOT COMPLETE 3 VIEW BILATERAL: ICD-10-PCS | Mod: 26,50,HCNC, | Performed by: RADIOLOGY

## 2019-11-22 PROCEDURE — 3077F SYST BP >= 140 MM HG: CPT | Mod: HCNC,CPTII,S$GLB, | Performed by: INTERNAL MEDICINE

## 2019-11-25 NOTE — PROGRESS NOTES
History of present illness:  74-year-old female I saw initially in 2001.  She presented at that time with Raynaud's phenomena she had no definite evidence of a connective tissue disease.  She did have osteoarthritis.  I saw her again in 2007.  She is complaining of bilateral shoulder pain which I felt was bursitis.  She had had dryness of her eyes and occasionally her mouth.  She had a positive rheumatoid factor but no inflammatory arthritis.  I diagnosed her as having primary Sjogren syndrome.  Since then she has had an intermittent inflammatory arthritis.  I placed her on Plaquenil.  She has also been taking Evoxac for dry mouth.  I saw her last 1 year ago.  Since then she has been evaluated by Dr. Reinoso.  She has had an intermittent arthritis flares.  He complains now is swelling of the 3rd MCP for 3 weeks.  She has trouble using her hands.  She has occasional pain in the knee with possible swelling.  She had an episode of shoulder problems which resolved with injection and physical therapy.  He still has trouble moving her shoulder.  She is doing her exercise.  She has trouble turning her neck.  She has some pain on the lateral aspect of hip.  She has some pain in the left ankle and toes.    She was tried on methotrexate but had a reaction.  She has been taking meloxicam occasionally, which does help.  Heat and topical medications have been helping.  She remains on Plaquenil 400 mg daily she is taking Evoxac twice daily.  She is using over-the-counter eyedrops.    Physical examination:  Musculoskeletal:  She has decreased range of motion of cervical spine with pain on range of motion.  She has decreased range of motion of the shoulders, worse on the left than on the right.  She has no tenderness to palpation.  Elbows and wrists are unremarkable.  She has soft tissue swelling of the right 2nd and 3rd MCP.  She has tenderness of the 3rd right MCP.  Other small joints of the hands are unremarkable.  Lumbar spine  has good range of motion without pain on range of motion.  She has tenderness of the left greater trochanter.  She has full range of motion of the hips.  Knees have good range of motion without pain on range of motion.  She has soft tissue swelling of the left ankle but no tenderness.  She has tenderness across the MTPs.    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  She has no history of chronic synovitis.  She has symptoms of Sjogren syndrome.  She also has underlying osteoarthritis.    Plans:  1.  Laboratory studies and x-rays are obtained  2.  I recommend she take meloxicam more frequently  3.  Continue Plaquenil as before  4.  Return in 4 months  Answers for HPI/ROS submitted by the patient on 11/21/2019   fever: No  eye redness: No  headaches: No  shortness of breath: No  chest pain: No  trouble swallowing: No  diarrhea: No  constipation: Yes  unexpected weight change: No  genital sore: No  dysuria: No  During the last 3 days, have you had a skin rash?: No  Bruises or bleeds easily: No  cough: No

## 2019-12-05 DIAGNOSIS — M15.9 PRIMARY OSTEOARTHRITIS INVOLVING MULTIPLE JOINTS: ICD-10-CM

## 2019-12-05 DIAGNOSIS — M54.12 CERVICAL RADICULOPATHY: ICD-10-CM

## 2019-12-05 DIAGNOSIS — M06.9 RHEUMATOID ARTHRITIS, INVOLVING UNSPECIFIED SITE, UNSPECIFIED RHEUMATOID FACTOR PRESENCE: ICD-10-CM

## 2019-12-05 DIAGNOSIS — Z79.899 HIGH RISK MEDICATION USE: ICD-10-CM

## 2019-12-05 DIAGNOSIS — G56.00 CARPAL TUNNEL SYNDROME, UNSPECIFIED LATERALITY: ICD-10-CM

## 2019-12-05 DIAGNOSIS — M35.01 SJOGREN'S SYNDROME WITH KERATOCONJUNCTIVITIS SICCA: ICD-10-CM

## 2019-12-05 DIAGNOSIS — M85.80 OSTEOPENIA DETERMINED BY X-RAY: ICD-10-CM

## 2019-12-05 RX ORDER — HYDROXYCHLOROQUINE SULFATE 200 MG/1
TABLET, FILM COATED ORAL
Qty: 180 TABLET | Refills: 3 | Status: SHIPPED | OUTPATIENT
Start: 2019-12-05 | End: 2020-08-11 | Stop reason: SDUPTHER

## 2019-12-13 ENCOUNTER — OFFICE VISIT (OUTPATIENT)
Dept: INTERNAL MEDICINE | Facility: CLINIC | Age: 74
End: 2019-12-13
Payer: MEDICARE

## 2019-12-13 ENCOUNTER — TELEPHONE (OUTPATIENT)
Dept: INTERNAL MEDICINE | Facility: CLINIC | Age: 74
End: 2019-12-13

## 2019-12-13 VITALS
WEIGHT: 172.19 LBS | DIASTOLIC BLOOD PRESSURE: 86 MMHG | HEART RATE: 77 BPM | BODY MASS INDEX: 28.69 KG/M2 | OXYGEN SATURATION: 99 % | SYSTOLIC BLOOD PRESSURE: 118 MMHG | TEMPERATURE: 98 F | HEIGHT: 65 IN

## 2019-12-13 DIAGNOSIS — J06.9 URI WITH COUGH AND CONGESTION: Primary | ICD-10-CM

## 2019-12-13 PROCEDURE — 1159F MED LIST DOCD IN RCRD: CPT | Mod: HCNC,S$GLB,, | Performed by: FAMILY MEDICINE

## 2019-12-13 PROCEDURE — 1126F PR PAIN SEVERITY QUANTIFIED, NO PAIN PRESENT: ICD-10-PCS | Mod: HCNC,S$GLB,, | Performed by: FAMILY MEDICINE

## 2019-12-13 PROCEDURE — 1101F PR PT FALLS ASSESS DOC 0-1 FALLS W/OUT INJ PAST YR: ICD-10-PCS | Mod: HCNC,CPTII,S$GLB, | Performed by: FAMILY MEDICINE

## 2019-12-13 PROCEDURE — 3074F PR MOST RECENT SYSTOLIC BLOOD PRESSURE < 130 MM HG: ICD-10-PCS | Mod: HCNC,CPTII,S$GLB, | Performed by: FAMILY MEDICINE

## 2019-12-13 PROCEDURE — 1159F PR MEDICATION LIST DOCUMENTED IN MEDICAL RECORD: ICD-10-PCS | Mod: HCNC,S$GLB,, | Performed by: FAMILY MEDICINE

## 2019-12-13 PROCEDURE — 1126F AMNT PAIN NOTED NONE PRSNT: CPT | Mod: HCNC,S$GLB,, | Performed by: FAMILY MEDICINE

## 2019-12-13 PROCEDURE — 3074F SYST BP LT 130 MM HG: CPT | Mod: HCNC,CPTII,S$GLB, | Performed by: FAMILY MEDICINE

## 2019-12-13 PROCEDURE — 99999 PR PBB SHADOW E&M-EST. PATIENT-LVL III: ICD-10-PCS | Mod: PBBFAC,HCNC,, | Performed by: FAMILY MEDICINE

## 2019-12-13 PROCEDURE — 3079F DIAST BP 80-89 MM HG: CPT | Mod: HCNC,CPTII,S$GLB, | Performed by: FAMILY MEDICINE

## 2019-12-13 PROCEDURE — 99999 PR PBB SHADOW E&M-EST. PATIENT-LVL III: CPT | Mod: PBBFAC,HCNC,, | Performed by: FAMILY MEDICINE

## 2019-12-13 PROCEDURE — 99213 OFFICE O/P EST LOW 20 MIN: CPT | Mod: HCNC,S$GLB,, | Performed by: FAMILY MEDICINE

## 2019-12-13 PROCEDURE — 3079F PR MOST RECENT DIASTOLIC BLOOD PRESSURE 80-89 MM HG: ICD-10-PCS | Mod: HCNC,CPTII,S$GLB, | Performed by: FAMILY MEDICINE

## 2019-12-13 PROCEDURE — 1101F PT FALLS ASSESS-DOCD LE1/YR: CPT | Mod: HCNC,CPTII,S$GLB, | Performed by: FAMILY MEDICINE

## 2019-12-13 PROCEDURE — 99213 PR OFFICE/OUTPT VISIT, EST, LEVL III, 20-29 MIN: ICD-10-PCS | Mod: HCNC,S$GLB,, | Performed by: FAMILY MEDICINE

## 2019-12-13 RX ORDER — BENZONATATE 100 MG/1
100 CAPSULE ORAL 3 TIMES DAILY PRN
Qty: 30 CAPSULE | Refills: 0 | Status: SHIPPED | OUTPATIENT
Start: 2019-12-13 | End: 2019-12-23
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
## 2019-12-13 NOTE — TELEPHONE ENCOUNTER
----- Message from Joselin Locke sent at 12/13/2019  8:28 AM CST -----  Contact: self/ [phone]  Patient would like to get medical advice.  Symptoms (please be specific):  Cold, pressure in head, congested, no temp, coughing  How long has patient had these symptoms:  5 days  Pharmacy name and phone # (copy/paste from chart):  [id number] - [place], [place] - [address] [phone] (Phone)  [phone] (Fax)  Any drug allergies (copy/paste from chart):      Would the patient rather a call back or a response via [facility]?:    Comments:  Please call and advise. Patient says that there are some medications that she is taking that may not agree with what she is taking.

## 2020-02-18 ENCOUNTER — PATIENT MESSAGE (OUTPATIENT)
Dept: INTERNAL MEDICINE | Facility: CLINIC | Age: 75
End: 2020-02-18

## 2020-03-02 ENCOUNTER — PATIENT MESSAGE (OUTPATIENT)
Dept: INTERNAL MEDICINE | Facility: CLINIC | Age: 75
End: 2020-03-02

## 2020-03-03 ENCOUNTER — PATIENT MESSAGE (OUTPATIENT)
Dept: INTERNAL MEDICINE | Facility: CLINIC | Age: 75
End: 2020-03-03

## 2020-03-04 DIAGNOSIS — Z91.89 AT HIGH RISK FOR OSTEOPOROSIS: ICD-10-CM

## 2020-03-04 DIAGNOSIS — M85.80 OSTEOPENIA DETERMINED BY X-RAY: ICD-10-CM

## 2020-03-04 NOTE — TELEPHONE ENCOUNTER
Last appt 8/2019 - DEXA ordered.    DEXA perfomed 11/1/2019, note sent to patient:  Bone density test still shows osteopenia with a recommendation to continue treatment. The bone density at the low spine has gone up a little, but it has gone slightly down at the hip.     It would be reasonable to continue the fosamax and check again in 2 years, or to try switching to prolia (injection medication done every 6 months) but we should discuss that in person if that's something you're interested in trying.     Note from 11/2019 (patient message): Patient planned to take the fosamax she had just received and make an appointment to follow up and discuss alternative.  She was recommended at visit 8/2019 to f/u in 6 months for coordination of care as well.    Has not scheduled appt.    Note sent to patient - DEXA stable so no clear need to change therapy unless she is having problems with the fosamax. Requested she let me know if she is having problems with it, or if she wants to schedule follow up.    If doing well and no med changes, fine to follow up in August for annual exam.

## 2020-03-04 NOTE — TELEPHONE ENCOUNTER
Addressed in other encounter -- see note with extensive chart review.    I have not recommended switching to prolia as her DEXA was overall stable, so reasonable to continue fosamax unless she wants to switch. Happy to see her for appt to discuss if she would like to do that.

## 2020-03-05 RX ORDER — ALENDRONATE SODIUM 70 MG/1
70 TABLET ORAL
Qty: 12 TABLET | Refills: 3 | Status: SHIPPED | OUTPATIENT
Start: 2020-03-05 | End: 2020-12-07 | Stop reason: SDUPTHER

## 2020-03-18 ENCOUNTER — PATIENT MESSAGE (OUTPATIENT)
Dept: RHEUMATOLOGY | Facility: CLINIC | Age: 75
End: 2020-03-18

## 2020-03-23 ENCOUNTER — TELEPHONE (OUTPATIENT)
Dept: RHEUMATOLOGY | Facility: CLINIC | Age: 75
End: 2020-03-23

## 2020-03-24 ENCOUNTER — PATIENT MESSAGE (OUTPATIENT)
Dept: RHEUMATOLOGY | Facility: CLINIC | Age: 75
End: 2020-03-24

## 2020-03-24 ENCOUNTER — TELEPHONE (OUTPATIENT)
Dept: RHEUMATOLOGY | Facility: CLINIC | Age: 75
End: 2020-03-24

## 2020-03-24 ENCOUNTER — OFFICE VISIT (OUTPATIENT)
Dept: RHEUMATOLOGY | Facility: CLINIC | Age: 75
End: 2020-03-24
Payer: MEDICARE

## 2020-03-24 ENCOUNTER — LAB VISIT (OUTPATIENT)
Dept: LAB | Facility: HOSPITAL | Age: 75
End: 2020-03-24
Attending: INTERNAL MEDICINE
Payer: MEDICARE

## 2020-03-24 DIAGNOSIS — M35.00 SJOGREN'S SYNDROME WITHOUT EXTRAGLANDULAR INVOLVEMENT: ICD-10-CM

## 2020-03-24 DIAGNOSIS — M06.9 RHEUMATOID ARTHRITIS, INVOLVING UNSPECIFIED SITE, UNSPECIFIED RHEUMATOID FACTOR PRESENCE: Primary | ICD-10-CM

## 2020-03-24 DIAGNOSIS — M06.9 RHEUMATOID ARTHRITIS, INVOLVING UNSPECIFIED SITE, UNSPECIFIED RHEUMATOID FACTOR PRESENCE: ICD-10-CM

## 2020-03-24 DIAGNOSIS — M15.9 PRIMARY OSTEOARTHRITIS INVOLVING MULTIPLE JOINTS: ICD-10-CM

## 2020-03-24 LAB
CRP SERPL-MCNC: 5.3 MG/L (ref 0–8.2)
ERYTHROCYTE [SEDIMENTATION RATE] IN BLOOD BY WESTERGREN METHOD: 20 MM/HR (ref 0–36)

## 2020-03-24 PROCEDURE — 99499 UNLISTED E&M SERVICE: CPT | Mod: HCNC,95,, | Performed by: INTERNAL MEDICINE

## 2020-03-24 PROCEDURE — 99213 OFFICE O/P EST LOW 20 MIN: CPT | Mod: HCNC,95,, | Performed by: INTERNAL MEDICINE

## 2020-03-24 PROCEDURE — 1101F PT FALLS ASSESS-DOCD LE1/YR: CPT | Mod: HCNC,CPTII,95, | Performed by: INTERNAL MEDICINE

## 2020-03-24 PROCEDURE — 85652 RBC SED RATE AUTOMATED: CPT | Mod: HCNC

## 2020-03-24 PROCEDURE — 36415 COLL VENOUS BLD VENIPUNCTURE: CPT | Mod: HCNC

## 2020-03-24 PROCEDURE — 99213 PR OFFICE/OUTPT VISIT, EST, LEVL III, 20-29 MIN: ICD-10-PCS | Mod: HCNC,95,, | Performed by: INTERNAL MEDICINE

## 2020-03-24 PROCEDURE — 1159F MED LIST DOCD IN RCRD: CPT | Mod: HCNC,95,, | Performed by: INTERNAL MEDICINE

## 2020-03-24 PROCEDURE — 1159F PR MEDICATION LIST DOCUMENTED IN MEDICAL RECORD: ICD-10-PCS | Mod: HCNC,95,, | Performed by: INTERNAL MEDICINE

## 2020-03-24 PROCEDURE — 99499 RISK ADDL DX/OHS AUDIT: ICD-10-PCS | Mod: HCNC,95,, | Performed by: INTERNAL MEDICINE

## 2020-03-24 PROCEDURE — 86140 C-REACTIVE PROTEIN: CPT | Mod: HCNC

## 2020-03-24 PROCEDURE — 1101F PR PT FALLS ASSESS DOC 0-1 FALLS W/OUT INJ PAST YR: ICD-10-PCS | Mod: HCNC,CPTII,95, | Performed by: INTERNAL MEDICINE

## 2020-03-24 RX ORDER — METHYLPREDNISOLONE 4 MG/1
TABLET ORAL
Qty: 21 TABLET | Refills: 0 | Status: SHIPPED | OUTPATIENT
Start: 2020-03-24 | End: 2020-12-24 | Stop reason: ALTCHOICE

## 2020-03-24 NOTE — PROGRESS NOTES
Rapid3 Question Responses and Scores 3/18/2020   MDHAQ Score 0.9   Psychologic Score 0   Pain Score 4   When you awakened in the morning OVER THE LAST WEEK, did you feel stiff? Yes   If Yes, please indicate the number of hours until you are as limber as you will be for the day 3   Fatigue Score 1   Global Health Score 5   RAPID3 Score 4

## 2020-03-24 NOTE — PROGRESS NOTES
The patient location is: home  The chief complaint leading to consultation is: arthritis  Visit type: Virtual visit with synchronous audio and video.    Total time spent with patient: 15 min  Each patient to whom he or she provides medical services by telemedicine is:  (1) informed of the relationship between the physician and patient and the respective role of any other health care provider with respect to management of the patient; and (2) notified that he or she may decline to receive medical services by telemedicine and may withdraw from such care at any time.    History of present illness:  75-year-old female I saw initially in 2001.  She presented at that time with Raynaud's phenomena she had no definite evidence of a connective tissue disease.  She did have osteoarthritis.  I saw her again in 2007.  She is complaining of bilateral shoulder pain which I felt was bursitis.  She had had dryness of her eyes and occasionally her mouth.  She had a positive rheumatoid factor but no inflammatory arthritis.  I diagnosed her as having primary Sjogren syndrome.  Since then she has had an intermittent inflammatory arthritis.  I placed her on Plaquenil.  She has also been taking Evoxac twice daily for dry mouth.  When I saw her in November she did have some swelling of the right 2nd and 3rd MCP but she had normal inflammatory markers.  I may no change in her medications at that time.    She has been complaining of increased aching for the past 2 months.  The pains remains migratory.  It last up to 3 days at a.  One month ago she developed pain and swelling in the right hand and wrist which has not yet resolved.  She started taking meloxicam on a daily basis which has been helping.  The pain is bad in the morning.  She has several hours of morning stiffness.  She has some problem lifting her shoulders.  She has trouble bending her knees like to get out of a chair.  She has had no swelling elsewhere.  She remains on topical  medications, which has been helping.    She has had no fever or rash.  Her dryness has been stable.  She has no numbness or tingling.  She is still wearing the carpal tunnel splint.    She sent me a picture of the right hand and wrist which does appear to show some soft tissue swelling there is no erythema.    Assessment:  It is hard to determine at this time whether her increased discomfort is due to her underlying inflammatory arthritis or due to her osteoarthritis.    Plans:  1.  I placed her on a Medrol Dosepak  2.  I have ordered laboratory studies.  3.  Continue meloxicam, Evoxac, and Plaquenil as before  4.  Return to see me in 2 months.  Answers for HPI/ROS submitted by the patient on 3/18/2020   fever: No  eye redness: No  headaches: No  shortness of breath: No  chest pain: No  trouble swallowing: No  diarrhea: No  constipation: Yes  unexpected weight change: No  genital sore: No  dysuria: No  During the last 3 days, have you had a skin rash?: No  Bruises or bleeds easily: No  cough: No

## 2020-03-25 ENCOUNTER — PATIENT MESSAGE (OUTPATIENT)
Dept: RHEUMATOLOGY | Facility: CLINIC | Age: 75
End: 2020-03-25

## 2020-03-26 ENCOUNTER — PATIENT MESSAGE (OUTPATIENT)
Dept: RHEUMATOLOGY | Facility: CLINIC | Age: 75
End: 2020-03-26

## 2020-03-26 RX ORDER — MELOXICAM 15 MG/1
15 TABLET ORAL DAILY
Qty: 90 TABLET | Refills: 1 | Status: SHIPPED | OUTPATIENT
Start: 2020-03-26 | End: 2020-06-24

## 2020-03-31 ENCOUNTER — PATIENT MESSAGE (OUTPATIENT)
Dept: RHEUMATOLOGY | Facility: CLINIC | Age: 75
End: 2020-03-31

## 2020-04-06 ENCOUNTER — PATIENT MESSAGE (OUTPATIENT)
Dept: RHEUMATOLOGY | Facility: CLINIC | Age: 75
End: 2020-04-06

## 2020-04-06 ENCOUNTER — PATIENT MESSAGE (OUTPATIENT)
Dept: INTERNAL MEDICINE | Facility: CLINIC | Age: 75
End: 2020-04-06

## 2020-04-06 NOTE — TELEPHONE ENCOUNTER
Advance directive living will and healthcare power of  documents uploaded by patient -- these are currently available under Media tab.    Please add these to her Advance Directives section of chart in Epic

## 2020-04-07 ENCOUNTER — PATIENT MESSAGE (OUTPATIENT)
Dept: INTERNAL MEDICINE | Facility: CLINIC | Age: 75
End: 2020-04-07

## 2020-05-10 ENCOUNTER — PATIENT MESSAGE (OUTPATIENT)
Dept: RHEUMATOLOGY | Facility: CLINIC | Age: 75
End: 2020-05-10

## 2020-05-10 DIAGNOSIS — M79.642 PAIN IN BOTH HANDS: Primary | ICD-10-CM

## 2020-05-10 DIAGNOSIS — M79.641 PAIN IN BOTH HANDS: Primary | ICD-10-CM

## 2020-05-11 ENCOUNTER — TELEPHONE (OUTPATIENT)
Dept: RHEUMATOLOGY | Facility: CLINIC | Age: 75
End: 2020-05-11

## 2020-05-18 ENCOUNTER — OFFICE VISIT (OUTPATIENT)
Dept: ORTHOPEDICS | Facility: CLINIC | Age: 75
End: 2020-05-18
Payer: MEDICARE

## 2020-05-18 VITALS
HEIGHT: 65 IN | WEIGHT: 172 LBS | HEART RATE: 73 BPM | DIASTOLIC BLOOD PRESSURE: 81 MMHG | SYSTOLIC BLOOD PRESSURE: 124 MMHG | BODY MASS INDEX: 28.66 KG/M2

## 2020-05-18 DIAGNOSIS — M67.431 GANGLION CYST OF WRIST, RIGHT: Primary | ICD-10-CM

## 2020-05-18 DIAGNOSIS — M65.312 TRIGGER FINGER OF LEFT THUMB: ICD-10-CM

## 2020-05-18 PROCEDURE — 20550 NJX 1 TENDON SHEATH/LIGAMENT: CPT | Mod: HCNC,59,51,FA | Performed by: PHYSICIAN ASSISTANT

## 2020-05-18 PROCEDURE — 99999 PR PBB SHADOW E&M-EST. PATIENT-LVL III: CPT | Mod: PBBFAC,HCNC,, | Performed by: PHYSICIAN ASSISTANT

## 2020-05-18 PROCEDURE — 1101F PR PT FALLS ASSESS DOC 0-1 FALLS W/OUT INJ PAST YR: ICD-10-PCS | Mod: HCNC,CPTII,S$GLB, | Performed by: PHYSICIAN ASSISTANT

## 2020-05-18 PROCEDURE — 20612 PR ASPIRAT/INJECTION GANGLION CYST(S): ICD-10-PCS | Mod: HCNC,RT,S$GLB, | Performed by: PHYSICIAN ASSISTANT

## 2020-05-18 PROCEDURE — 1159F MED LIST DOCD IN RCRD: CPT | Mod: HCNC,S$GLB,, | Performed by: PHYSICIAN ASSISTANT

## 2020-05-18 PROCEDURE — 99214 PR OFFICE/OUTPT VISIT, EST, LEVL IV, 30-39 MIN: ICD-10-PCS | Mod: 25,HCNC,S$GLB, | Performed by: PHYSICIAN ASSISTANT

## 2020-05-18 PROCEDURE — 3079F PR MOST RECENT DIASTOLIC BLOOD PRESSURE 80-89 MM HG: ICD-10-PCS | Mod: HCNC,CPTII,S$GLB, | Performed by: PHYSICIAN ASSISTANT

## 2020-05-18 PROCEDURE — 3074F PR MOST RECENT SYSTOLIC BLOOD PRESSURE < 130 MM HG: ICD-10-PCS | Mod: HCNC,CPTII,S$GLB, | Performed by: PHYSICIAN ASSISTANT

## 2020-05-18 PROCEDURE — 20612 ASPIRATE/INJ GANGLION CYST: CPT | Mod: HCNC,RT,S$GLB, | Performed by: PHYSICIAN ASSISTANT

## 2020-05-18 PROCEDURE — 1159F PR MEDICATION LIST DOCUMENTED IN MEDICAL RECORD: ICD-10-PCS | Mod: HCNC,S$GLB,, | Performed by: PHYSICIAN ASSISTANT

## 2020-05-18 PROCEDURE — 1101F PT FALLS ASSESS-DOCD LE1/YR: CPT | Mod: HCNC,CPTII,S$GLB, | Performed by: PHYSICIAN ASSISTANT

## 2020-05-18 PROCEDURE — 99214 OFFICE O/P EST MOD 30 MIN: CPT | Mod: 25,HCNC,S$GLB, | Performed by: PHYSICIAN ASSISTANT

## 2020-05-18 PROCEDURE — 3074F SYST BP LT 130 MM HG: CPT | Mod: HCNC,CPTII,S$GLB, | Performed by: PHYSICIAN ASSISTANT

## 2020-05-18 PROCEDURE — 1125F PR PAIN SEVERITY QUANTIFIED, PAIN PRESENT: ICD-10-PCS | Mod: HCNC,S$GLB,, | Performed by: PHYSICIAN ASSISTANT

## 2020-05-18 PROCEDURE — 1125F AMNT PAIN NOTED PAIN PRSNT: CPT | Mod: HCNC,S$GLB,, | Performed by: PHYSICIAN ASSISTANT

## 2020-05-18 PROCEDURE — 99999 PR PBB SHADOW E&M-EST. PATIENT-LVL III: ICD-10-PCS | Mod: PBBFAC,HCNC,, | Performed by: PHYSICIAN ASSISTANT

## 2020-05-18 PROCEDURE — 20550 PR INJECT TENDON SHEATH/LIGAMENT: ICD-10-PCS | Mod: HCNC,59,51,FA | Performed by: PHYSICIAN ASSISTANT

## 2020-05-18 PROCEDURE — 3079F DIAST BP 80-89 MM HG: CPT | Mod: HCNC,CPTII,S$GLB, | Performed by: PHYSICIAN ASSISTANT

## 2020-05-18 RX ORDER — LIDOCAINE HYDROCHLORIDE 10 MG/ML
0.5 INJECTION, SOLUTION EPIDURAL; INFILTRATION; INTRACAUDAL; PERINEURAL ONCE
Status: COMPLETED | OUTPATIENT
Start: 2020-05-18 | End: 2020-05-18

## 2020-05-18 RX ORDER — DEXAMETHASONE SODIUM PHOSPHATE 4 MG/ML
4 INJECTION, SOLUTION INTRA-ARTICULAR; INTRALESIONAL; INTRAMUSCULAR; INTRAVENOUS; SOFT TISSUE
Status: COMPLETED | OUTPATIENT
Start: 2020-05-18 | End: 2020-05-18

## 2020-05-18 RX ORDER — LIDOCAINE HYDROCHLORIDE 10 MG/ML
5 INJECTION, SOLUTION EPIDURAL; INFILTRATION; INTRACAUDAL; PERINEURAL ONCE
Status: COMPLETED | OUTPATIENT
Start: 2020-05-18 | End: 2020-05-18

## 2020-05-18 RX ADMIN — DEXAMETHASONE SODIUM PHOSPHATE 4 MG: 4 INJECTION, SOLUTION INTRA-ARTICULAR; INTRALESIONAL; INTRAMUSCULAR; INTRAVENOUS; SOFT TISSUE at 05:05

## 2020-05-18 RX ADMIN — LIDOCAINE HYDROCHLORIDE 50 MG: 10 INJECTION, SOLUTION EPIDURAL; INFILTRATION; INTRACAUDAL; PERINEURAL at 06:05

## 2020-05-18 RX ADMIN — LIDOCAINE HYDROCHLORIDE 5 MG: 10 INJECTION, SOLUTION EPIDURAL; INFILTRATION; INTRACAUDAL; PERINEURAL at 06:05
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
## 2020-05-18 NOTE — LETTER
May 18, 2020      Dann Figueroa MD  [address]  [facility] [address]           [place] [address], [address]  [facility] [address]  Phone: [phone]          Patient: Urmila Martínez   MR Number: [id number]   YOB: 1945   Date of Visit: 5/18/2020       Dear Dr. Dann Figueroa:    Thank you for referring Urmila Martínez to me for evaluation. Attached you will find relevant portions of my assessment and plan of care.    If you have questions, please do not hesitate to call me. I look forward to following Urmila Martínez along with you.    Sincerely,    Viktoria Chaparro PA-C    Enclosure  CC:  No Recipients    If you would like to receive this communication electronically, please contact [email or web address] or [phone] to request more information on [employer] Link access.    For providers and/or their staff who would like to refer a patient to [facility], please contact us through our one-stop-shop provider referral line, [facility], at [phone].    If you feel you have received this communication in error or would no longer like to receive these types of communications, please e-mail [email or web address]

## 2020-05-21 ENCOUNTER — PATIENT MESSAGE (OUTPATIENT)
Dept: RHEUMATOLOGY | Facility: CLINIC | Age: 75
End: 2020-05-21

## 2020-05-21 DIAGNOSIS — Z79.899 HIGH RISK MEDICATION USE: ICD-10-CM

## 2020-05-21 DIAGNOSIS — G56.00 CARPAL TUNNEL SYNDROME, UNSPECIFIED LATERALITY: ICD-10-CM

## 2020-05-21 DIAGNOSIS — M06.9 RHEUMATOID ARTHRITIS, INVOLVING UNSPECIFIED SITE, UNSPECIFIED RHEUMATOID FACTOR PRESENCE: ICD-10-CM

## 2020-05-21 DIAGNOSIS — M35.01 SJOGREN'S SYNDROME WITH KERATOCONJUNCTIVITIS SICCA: ICD-10-CM

## 2020-05-21 DIAGNOSIS — M54.12 CERVICAL RADICULOPATHY: ICD-10-CM

## 2020-05-21 DIAGNOSIS — M85.80 OSTEOPENIA DETERMINED BY X-RAY: ICD-10-CM

## 2020-05-21 DIAGNOSIS — M15.9 PRIMARY OSTEOARTHRITIS INVOLVING MULTIPLE JOINTS: ICD-10-CM

## 2020-05-21 RX ORDER — CEVIMELINE HYDROCHLORIDE 30 MG/1
30 CAPSULE ORAL 2 TIMES DAILY
Qty: 180 CAPSULE | Refills: 3 | Status: SHIPPED | OUTPATIENT
Start: 2020-05-21 | End: 2020-08-11 | Stop reason: SDUPTHER

## 2020-05-22 ENCOUNTER — PATIENT MESSAGE (OUTPATIENT)
Dept: RHEUMATOLOGY | Facility: CLINIC | Age: 75
End: 2020-05-22

## 2020-05-26 ENCOUNTER — OFFICE VISIT (OUTPATIENT)
Dept: RHEUMATOLOGY | Facility: CLINIC | Age: 75
End: 2020-05-26
Payer: MEDICARE

## 2020-05-26 VITALS
HEART RATE: 68 BPM | BODY MASS INDEX: 28.65 KG/M2 | SYSTOLIC BLOOD PRESSURE: 167 MMHG | DIASTOLIC BLOOD PRESSURE: 86 MMHG | HEIGHT: 65 IN | TEMPERATURE: 98 F | WEIGHT: 171.94 LBS

## 2020-05-26 DIAGNOSIS — M35.00 SJOGREN'S SYNDROME, WITH UNSPECIFIED ORGAN INVOLVEMENT: ICD-10-CM

## 2020-05-26 DIAGNOSIS — M15.9 PRIMARY OSTEOARTHRITIS INVOLVING MULTIPLE JOINTS: Primary | ICD-10-CM

## 2020-05-26 PROCEDURE — 1101F PR PT FALLS ASSESS DOC 0-1 FALLS W/OUT INJ PAST YR: ICD-10-PCS | Mod: HCNC,CPTII,S$GLB, | Performed by: INTERNAL MEDICINE

## 2020-05-26 PROCEDURE — 1101F PT FALLS ASSESS-DOCD LE1/YR: CPT | Mod: HCNC,CPTII,S$GLB, | Performed by: INTERNAL MEDICINE

## 2020-05-26 PROCEDURE — 1126F AMNT PAIN NOTED NONE PRSNT: CPT | Mod: HCNC,S$GLB,, | Performed by: INTERNAL MEDICINE

## 2020-05-26 PROCEDURE — 3079F DIAST BP 80-89 MM HG: CPT | Mod: HCNC,CPTII,S$GLB, | Performed by: INTERNAL MEDICINE

## 2020-05-26 PROCEDURE — 1159F PR MEDICATION LIST DOCUMENTED IN MEDICAL RECORD: ICD-10-PCS | Mod: HCNC,S$GLB,, | Performed by: INTERNAL MEDICINE

## 2020-05-26 PROCEDURE — 99999 PR PBB SHADOW E&M-EST. PATIENT-LVL III: CPT | Mod: PBBFAC,HCNC,, | Performed by: INTERNAL MEDICINE

## 2020-05-26 PROCEDURE — 3079F PR MOST RECENT DIASTOLIC BLOOD PRESSURE 80-89 MM HG: ICD-10-PCS | Mod: HCNC,CPTII,S$GLB, | Performed by: INTERNAL MEDICINE

## 2020-05-26 PROCEDURE — 3077F PR MOST RECENT SYSTOLIC BLOOD PRESSURE >= 140 MM HG: ICD-10-PCS | Mod: HCNC,CPTII,S$GLB, | Performed by: INTERNAL MEDICINE

## 2020-05-26 PROCEDURE — 1159F MED LIST DOCD IN RCRD: CPT | Mod: HCNC,S$GLB,, | Performed by: INTERNAL MEDICINE

## 2020-05-26 PROCEDURE — 99499 RISK ADDL DX/OHS AUDIT: ICD-10-PCS | Mod: HCNC,S$GLB,, | Performed by: INTERNAL MEDICINE

## 2020-05-26 PROCEDURE — 99213 PR OFFICE/OUTPT VISIT, EST, LEVL III, 20-29 MIN: ICD-10-PCS | Mod: HCNC,S$GLB,, | Performed by: INTERNAL MEDICINE

## 2020-05-26 PROCEDURE — 1126F PR PAIN SEVERITY QUANTIFIED, NO PAIN PRESENT: ICD-10-PCS | Mod: HCNC,S$GLB,, | Performed by: INTERNAL MEDICINE

## 2020-05-26 PROCEDURE — 99499 UNLISTED E&M SERVICE: CPT | Mod: HCNC,S$GLB,, | Performed by: INTERNAL MEDICINE

## 2020-05-26 PROCEDURE — 99213 OFFICE O/P EST LOW 20 MIN: CPT | Mod: HCNC,S$GLB,, | Performed by: INTERNAL MEDICINE

## 2020-05-26 PROCEDURE — 3077F SYST BP >= 140 MM HG: CPT | Mod: HCNC,CPTII,S$GLB, | Performed by: INTERNAL MEDICINE

## 2020-05-26 PROCEDURE — 99999 PR PBB SHADOW E&M-EST. PATIENT-LVL III: ICD-10-PCS | Mod: PBBFAC,HCNC,, | Performed by: INTERNAL MEDICINE

## 2020-05-26 NOTE — PROGRESS NOTES
History of present illness:  75-year-old female I saw initially in 2001.  She presented at that time with Raynaud's phenomena she had no definite evidence of a connective tissue disease.  She did have osteoarthritis.  I saw her again in 2007.  She is complaining of bilateral shoulder pain which I felt was bursitis.  She had had dryness of her eyes and occasionally her mouth.  She had a positive rheumatoid factor but no inflammatory arthritis.  I diagnosed her as having primary Sjogren syndrome.  Since then she has had an intermittent inflammatory arthritis.  I placed her on Plaquenil.  She has also been taking Evoxac twice daily for dry mouth.  When I saw her in November she did have some swelling of the right 2nd and 3rd MCP but she had normal inflammatory markers.  I may no change in her medications at that time.  It should be noted that her inflammatory markers have always been normal.    We had a virtual visit in April.  She was complaining of aching all over.  She had problems primarily in the right hand and wrist.  I treated her with a Medrol Dosepak.  This alleviated most of her pain.  She was still having problems with swelling in her right wrist had and walking in her left thumb.  She was seen by Hand surgery and had injections in both areas.  This did help her pain but she still has some problems with the locking.    She continues to take meloxicam daily with some relief.  She is using tears on a daily basis.  She continues on meloxicam which has been helping.  She continues to take Evoxac.    Physical examination:  Musculoskeletal:  She has good range of motion of all joints.  The she has no synovitis.  She has some bony enlargement of the right wrist.  She has a tendon nodule of the left thumb.    Assessment:  1.  No evidence of active inflammatory arthritis  2.  Primary Sjogren syndrome    Plans:  1.  Continue medications as before  2.  Return to see me in 6 months  Answers for HPI/ROS submitted by the  patient on 5/21/2020   fever: No  eye redness: No  headaches: No  shortness of breath: No  chest pain: No  trouble swallowing: No  diarrhea: No  constipation: Yes  unexpected weight change: No  genital sore: No  dysuria: No  During the last 3 days, have you had a skin rash?: No  Bruises or bleeds easily: No  cough: No

## 2020-06-04 ENCOUNTER — TELEPHONE (OUTPATIENT)
Dept: ORTHOPEDICS | Facility: CLINIC | Age: 75
End: 2020-06-04

## 2020-06-04 NOTE — TELEPHONE ENCOUNTER
Spoke with pt and she stated she will try to hold out until next appt because she is trying to avoid surgery.  Pt was advised to call office if pain persist.

## 2020-06-05 ENCOUNTER — TELEPHONE (OUTPATIENT)
Dept: ORTHOPEDICS | Facility: CLINIC | Age: 75
End: 2020-06-05

## 2020-06-05 NOTE — TELEPHONE ENCOUNTER
Returned pt call. She would like to attempt aspiration of the cyst once more prior to surgical excision. Will also plan for repeat trigger thumb injection at next visit, she reports some relief from last injection.

## 2020-07-07 ENCOUNTER — TELEPHONE (OUTPATIENT)
Dept: ORTHOPEDICS | Facility: CLINIC | Age: 75
End: 2020-07-07

## 2020-07-07 ENCOUNTER — OFFICE VISIT (OUTPATIENT)
Dept: ORTHOPEDICS | Facility: CLINIC | Age: 75
End: 2020-07-07
Payer: MEDICARE

## 2020-07-07 VITALS
HEIGHT: 65 IN | BODY MASS INDEX: 28.49 KG/M2 | SYSTOLIC BLOOD PRESSURE: 132 MMHG | DIASTOLIC BLOOD PRESSURE: 78 MMHG | HEART RATE: 74 BPM | WEIGHT: 171 LBS

## 2020-07-07 DIAGNOSIS — G56.02 LEFT CARPAL TUNNEL SYNDROME: ICD-10-CM

## 2020-07-07 DIAGNOSIS — M65.312 TRIGGER THUMB OF LEFT HAND: Primary | ICD-10-CM

## 2020-07-07 DIAGNOSIS — Z01.818 PREOP TESTING: Primary | ICD-10-CM

## 2020-07-07 DIAGNOSIS — M67.431 GANGLION CYST OF WRIST, RIGHT: ICD-10-CM

## 2020-07-07 PROCEDURE — 99999 PR PBB SHADOW E&M-EST. PATIENT-LVL III: CPT | Mod: PBBFAC,HCNC,, | Performed by: ORTHOPAEDIC SURGERY

## 2020-07-07 PROCEDURE — 3078F DIAST BP <80 MM HG: CPT | Mod: HCNC,CPTII,S$GLB, | Performed by: ORTHOPAEDIC SURGERY

## 2020-07-07 PROCEDURE — 1101F PR PT FALLS ASSESS DOC 0-1 FALLS W/OUT INJ PAST YR: ICD-10-PCS | Mod: HCNC,CPTII,S$GLB, | Performed by: ORTHOPAEDIC SURGERY

## 2020-07-07 PROCEDURE — 3008F PR BODY MASS INDEX (BMI) DOCUMENTED: ICD-10-PCS | Mod: HCNC,CPTII,S$GLB, | Performed by: ORTHOPAEDIC SURGERY

## 2020-07-07 PROCEDURE — 3075F PR MOST RECENT SYSTOLIC BLOOD PRESS GE 130-139MM HG: ICD-10-PCS | Mod: HCNC,CPTII,S$GLB, | Performed by: ORTHOPAEDIC SURGERY

## 2020-07-07 PROCEDURE — 99214 PR OFFICE/OUTPT VISIT, EST, LEVL IV, 30-39 MIN: ICD-10-PCS | Mod: HCNC,S$GLB,, | Performed by: ORTHOPAEDIC SURGERY

## 2020-07-07 PROCEDURE — 3008F BODY MASS INDEX DOCD: CPT | Mod: HCNC,CPTII,S$GLB, | Performed by: ORTHOPAEDIC SURGERY

## 2020-07-07 PROCEDURE — 1159F PR MEDICATION LIST DOCUMENTED IN MEDICAL RECORD: ICD-10-PCS | Mod: HCNC,S$GLB,, | Performed by: ORTHOPAEDIC SURGERY

## 2020-07-07 PROCEDURE — 3078F PR MOST RECENT DIASTOLIC BLOOD PRESSURE < 80 MM HG: ICD-10-PCS | Mod: HCNC,CPTII,S$GLB, | Performed by: ORTHOPAEDIC SURGERY

## 2020-07-07 PROCEDURE — 3075F SYST BP GE 130 - 139MM HG: CPT | Mod: HCNC,CPTII,S$GLB, | Performed by: ORTHOPAEDIC SURGERY

## 2020-07-07 PROCEDURE — 99214 OFFICE O/P EST MOD 30 MIN: CPT | Mod: HCNC,S$GLB,, | Performed by: ORTHOPAEDIC SURGERY

## 2020-07-07 PROCEDURE — 1101F PT FALLS ASSESS-DOCD LE1/YR: CPT | Mod: HCNC,CPTII,S$GLB, | Performed by: ORTHOPAEDIC SURGERY

## 2020-07-07 PROCEDURE — 99999 PR PBB SHADOW E&M-EST. PATIENT-LVL III: ICD-10-PCS | Mod: PBBFAC,HCNC,, | Performed by: ORTHOPAEDIC SURGERY

## 2020-07-07 PROCEDURE — 1159F MED LIST DOCD IN RCRD: CPT | Mod: HCNC,S$GLB,, | Performed by: ORTHOPAEDIC SURGERY

## 2020-07-07 NOTE — PROGRESS NOTES
I have personally taken the history and examined this patient. I agree with the resident's note as stated above. Plan for left thumb trigger finger injection, right dorsal wrist ulnar side biopsy of mass.  I have explained the risks, benefits, and alternatives of the procedure to the patient in great detail. The patient voices understanding and all questions have been answered. The patient agrees with to proceed as planned. Consents were performed in clinic.
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
## 2020-07-08 ENCOUNTER — TELEPHONE (OUTPATIENT)
Dept: ORTHOPEDICS | Facility: CLINIC | Age: 75
End: 2020-07-08

## 2020-07-08 NOTE — TELEPHONE ENCOUNTER
Dr Garcia, patient was seen in our clinic on yesterday, and is scheduled for an Excision of Ganglion Cyst on 7/31/20. She takes Aspirin. Should this medicine be held prior to her surgery, and if so, what time frame?

## 2020-07-09 ENCOUNTER — TELEPHONE (OUTPATIENT)
Dept: ORTHOPEDICS | Facility: CLINIC | Age: 75
End: 2020-07-09

## 2020-07-09 NOTE — TELEPHONE ENCOUNTER
Spoke c pt. Confirmed that she had EMG in 02/2019. Advised that I will confirm c Dr. Garner tomorrow if she needs a repeat study. Pt states that since her clinic visit 07/07/20, she has felt much better. She stated that due to COVID she may postpone surgery but will make that decision closer to surgery date of 07/31/20. Pt expressed understanding & was thankful.

## 2020-07-09 NOTE — TELEPHONE ENCOUNTER
Pt was notified of orders from Dr Garcia to hold ASA 5-7 days prior to surgery. Verbalized understanding.

## 2020-07-09 NOTE — TELEPHONE ENCOUNTER
Pt asked if she needs to have a nerve test prior to hand surgery this month. She stated that she was asked if she ever had one on her right hand at the visit, but one wasn't ordered

## 2020-07-10 ENCOUNTER — TELEPHONE (OUTPATIENT)
Dept: ORTHOPEDICS | Facility: CLINIC | Age: 75
End: 2020-07-10

## 2020-07-10 DIAGNOSIS — M67.431 GANGLION CYST OF WRIST, RIGHT: Primary | ICD-10-CM

## 2020-07-10 NOTE — TELEPHONE ENCOUNTER
Spoke c Zaira Pickett MD PGY5. She confirmed that pt does not need repeat EMG.     ==  Spoke demond pt. Informed her of above. Pt expressed understanding & was thankful.

## 2020-07-14 ENCOUNTER — TELEPHONE (OUTPATIENT)
Dept: ORTHOPEDICS | Facility: CLINIC | Age: 75
End: 2020-07-14

## 2020-07-14 NOTE — TELEPHONE ENCOUNTER
----- Message from Elisabeth Machado sent at 7/14/2020  1:10 PM CDT -----  Contact: Patient [phone]  Type: Patient Call Back    Who called: Patient    What is the request in detail: Patient would like to speak to Elda in regards to the surgery she's having on 07-31-20. Please call.     Would the patient rather a call back or a response via [facility]? Call back    Best call back number: [phone]

## 2020-07-14 NOTE — TELEPHONE ENCOUNTER
Spoke c pt. She reports decreased pain in her wrist. She also states that the size of one of the parts of the cyst has decreased. She wants to know if she should still have surgery. Advised pt that we can postpone surgery if she is feeling better. Agreed that I will contact pt next Friday, 07/24/20 to f/u & will make her final decision at that time. Pt expressed understanding & was thankful.

## 2020-07-24 ENCOUNTER — TELEPHONE (OUTPATIENT)
Dept: ORTHOPEDICS | Facility: CLINIC | Age: 75
End: 2020-07-24

## 2020-07-24 NOTE — TELEPHONE ENCOUNTER
Patient would like to cancel her surgery her lumps went down she said the pain has subsided and she will call back after the pandemic if she has any problems.----- Message from Rhona Armenta sent at 7/24/2020  8:51 AM CDT -----    Name of Who is Calling:ONEL ORONA [[id number]]      What is the request in detail: Pt would like to cancel surgery pt states the pain has subsided Please contact to further discuss and advise    Can the clinic reply by [facility]: yes      What Number to Call Back [facility]: [phone]

## 2020-08-11 DIAGNOSIS — I70.0 AORTIC ATHEROSCLEROSIS: ICD-10-CM

## 2020-08-11 RX ORDER — ATORVASTATIN CALCIUM 20 MG/1
20 TABLET, FILM COATED ORAL DAILY
Qty: 90 TABLET | Refills: 1 | Status: SHIPPED | OUTPATIENT
Start: 2020-08-11 | End: 2021-06-21 | Stop reason: SDUPTHER

## 2020-08-12 ENCOUNTER — PATIENT MESSAGE (OUTPATIENT)
Dept: INTERNAL MEDICINE | Facility: CLINIC | Age: 75
End: 2020-08-12

## 2020-08-12 DIAGNOSIS — I70.0 AORTIC ATHEROSCLEROSIS: ICD-10-CM

## 2020-08-12 NOTE — TELEPHONE ENCOUNTER
Refill sent in.  Due for follow up, please contact to schedule  Next available ok if no new/urgent issues

## 2020-08-14 ENCOUNTER — LAB VISIT (OUTPATIENT)
Dept: LAB | Facility: OTHER | Age: 75
End: 2020-08-14
Payer: MEDICARE

## 2020-08-14 DIAGNOSIS — Z03.818 ENCOUNTER FOR OBSERVATION FOR SUSPECTED EXPOSURE TO OTHER BIOLOGICAL AGENTS RULED OUT: ICD-10-CM

## 2020-08-14 PROCEDURE — U0003 INFECTIOUS AGENT DETECTION BY NUCLEIC ACID (DNA OR RNA); SEVERE ACUTE RESPIRATORY SYNDROME CORONAVIRUS 2 (SARS-COV-2) (CORONAVIRUS DISEASE [COVID-19]), AMPLIFIED PROBE TECHNIQUE, MAKING USE OF HIGH THROUGHPUT TECHNOLOGIES AS DESCRIBED BY CMS-2020-01-R: HCPCS | Mod: HCNC

## 2020-08-17 LAB — SARS-COV-2 RNA RESP QL NAA+PROBE: NOT DETECTED

## 2020-08-21 ENCOUNTER — LAB VISIT (OUTPATIENT)
Dept: LAB | Facility: OTHER | Age: 75
End: 2020-08-21
Attending: INTERNAL MEDICINE
Payer: MEDICARE

## 2020-08-21 DIAGNOSIS — Z03.818 ENCOUNTER FOR OBSERVATION FOR SUSPECTED EXPOSURE TO OTHER BIOLOGICAL AGENTS RULED OUT: ICD-10-CM

## 2020-08-21 PROCEDURE — U0003 INFECTIOUS AGENT DETECTION BY NUCLEIC ACID (DNA OR RNA); SEVERE ACUTE RESPIRATORY SYNDROME CORONAVIRUS 2 (SARS-COV-2) (CORONAVIRUS DISEASE [COVID-19]), AMPLIFIED PROBE TECHNIQUE, MAKING USE OF HIGH THROUGHPUT TECHNOLOGIES AS DESCRIBED BY CMS-2020-01-R: HCPCS | Mod: HCNC

## 2020-08-24 DIAGNOSIS — Z03.818 ENCOUNTER FOR OBSERVATION FOR SUSPECTED EXPOSURE TO OTHER BIOLOGICAL AGENTS RULED OUT: ICD-10-CM

## 2020-08-24 LAB — SARS-COV-2 RNA RESP QL NAA+PROBE: NOT DETECTED

## 2020-09-02 ENCOUNTER — TELEPHONE (OUTPATIENT)
Dept: INTERNAL MEDICINE | Facility: CLINIC | Age: 75
End: 2020-09-02

## 2020-09-02 NOTE — TELEPHONE ENCOUNTER
----- Message from Eugenia Locke sent at 9/2/2020  2:17 PM CDT -----  Contact: Self [phone]  Would like to receive medical advice.    Symptoms (please be specific):  knee swelling    How long has the patient had these symptoms:  today    Any drug allergies (copy/paste from chart):       Pharm[facility] name/number (copy/paste from chart):  [facility]    Would they like a call back or a response via [facility]:  call back    Additional information:  Calling to speak with the nurse regarding pt is having left knee swelling. Patient states she injured her knee getting into the bathtub. Patient would like a call back regarding if the provider can refer her to orthopedics or advice. Also pt made an appt for next week, but would like to know if a sooner appt is available.

## 2020-09-03 ENCOUNTER — OFFICE VISIT (OUTPATIENT)
Dept: INTERNAL MEDICINE | Facility: CLINIC | Age: 75
End: 2020-09-03
Payer: MEDICARE

## 2020-09-03 ENCOUNTER — HOSPITAL ENCOUNTER (OUTPATIENT)
Dept: RADIOLOGY | Facility: HOSPITAL | Age: 75
Discharge: HOME | End: 2020-09-03
Attending: FAMILY MEDICINE
Payer: MEDICARE

## 2020-09-03 VITALS
HEART RATE: 78 BPM | OXYGEN SATURATION: 97 % | SYSTOLIC BLOOD PRESSURE: 120 MMHG | BODY MASS INDEX: 29.09 KG/M2 | DIASTOLIC BLOOD PRESSURE: 76 MMHG | WEIGHT: 174.81 LBS

## 2020-09-03 DIAGNOSIS — G89.29 CHRONIC PAIN OF LEFT KNEE: Primary | ICD-10-CM

## 2020-09-03 DIAGNOSIS — M25.562 CHRONIC PAIN OF LEFT KNEE: ICD-10-CM

## 2020-09-03 DIAGNOSIS — G89.29 CHRONIC PAIN OF LEFT KNEE: ICD-10-CM

## 2020-09-03 DIAGNOSIS — M25.562 CHRONIC PAIN OF LEFT KNEE: Primary | ICD-10-CM

## 2020-09-03 PROCEDURE — 73564 XR KNEE COMP 4 OR MORE VIEWS LEFT: ICD-10-PCS | Mod: 26,HCNC,LT, | Performed by: RADIOLOGY

## 2020-09-03 PROCEDURE — 73564 X-RAY EXAM KNEE 4 OR MORE: CPT | Mod: 26,HCNC,LT, | Performed by: RADIOLOGY

## 2020-09-03 PROCEDURE — 99214 OFFICE O/P EST MOD 30 MIN: CPT | Mod: HCNC,S$GLB,, | Performed by: FAMILY MEDICINE

## 2020-09-03 PROCEDURE — 1101F PR PT FALLS ASSESS DOC 0-1 FALLS W/OUT INJ PAST YR: ICD-10-PCS | Mod: HCNC,CPTII,S$GLB, | Performed by: FAMILY MEDICINE

## 2020-09-03 PROCEDURE — 1159F PR MEDICATION LIST DOCUMENTED IN MEDICAL RECORD: ICD-10-PCS | Mod: HCNC,S$GLB,, | Performed by: FAMILY MEDICINE

## 2020-09-03 PROCEDURE — 1125F PR PAIN SEVERITY QUANTIFIED, PAIN PRESENT: ICD-10-PCS | Mod: HCNC,S$GLB,, | Performed by: FAMILY MEDICINE

## 2020-09-03 PROCEDURE — 1125F AMNT PAIN NOTED PAIN PRSNT: CPT | Mod: HCNC,S$GLB,, | Performed by: FAMILY MEDICINE

## 2020-09-03 PROCEDURE — 99999 PR PBB SHADOW E&M-EST. PATIENT-LVL V: CPT | Mod: PBBFAC,HCNC,, | Performed by: FAMILY MEDICINE

## 2020-09-03 PROCEDURE — 3078F PR MOST RECENT DIASTOLIC BLOOD PRESSURE < 80 MM HG: ICD-10-PCS | Mod: HCNC,CPTII,S$GLB, | Performed by: FAMILY MEDICINE

## 2020-09-03 PROCEDURE — 99214 PR OFFICE/OUTPT VISIT, EST, LEVL IV, 30-39 MIN: ICD-10-PCS | Mod: HCNC,S$GLB,, | Performed by: FAMILY MEDICINE

## 2020-09-03 PROCEDURE — 3074F PR MOST RECENT SYSTOLIC BLOOD PRESSURE < 130 MM HG: ICD-10-PCS | Mod: HCNC,CPTII,S$GLB, | Performed by: FAMILY MEDICINE

## 2020-09-03 PROCEDURE — 3074F SYST BP LT 130 MM HG: CPT | Mod: HCNC,CPTII,S$GLB, | Performed by: FAMILY MEDICINE

## 2020-09-03 PROCEDURE — 1159F MED LIST DOCD IN RCRD: CPT | Mod: HCNC,S$GLB,, | Performed by: FAMILY MEDICINE

## 2020-09-03 PROCEDURE — 1101F PT FALLS ASSESS-DOCD LE1/YR: CPT | Mod: HCNC,CPTII,S$GLB, | Performed by: FAMILY MEDICINE

## 2020-09-03 PROCEDURE — 73564 X-RAY EXAM KNEE 4 OR MORE: CPT | Mod: TC,HCNC,LT

## 2020-09-03 PROCEDURE — 3008F PR BODY MASS INDEX (BMI) DOCUMENTED: ICD-10-PCS | Mod: HCNC,CPTII,S$GLB, | Performed by: FAMILY MEDICINE

## 2020-09-03 PROCEDURE — 99999 PR PBB SHADOW E&M-EST. PATIENT-LVL V: ICD-10-PCS | Mod: PBBFAC,HCNC,, | Performed by: FAMILY MEDICINE

## 2020-09-03 PROCEDURE — 3078F DIAST BP <80 MM HG: CPT | Mod: HCNC,CPTII,S$GLB, | Performed by: FAMILY MEDICINE

## 2020-09-03 PROCEDURE — 3008F BODY MASS INDEX DOCD: CPT | Mod: HCNC,CPTII,S$GLB, | Performed by: FAMILY MEDICINE

## 2020-09-03 RX ORDER — DICLOFENAC SODIUM 10 MG/G
2 GEL TOPICAL DAILY
Qty: 150 G | Refills: 1 | Status: SHIPPED | OUTPATIENT
Start: 2020-09-03 | End: 2020-09-09 | Stop reason: SDUPTHER

## 2020-09-03 NOTE — TELEPHONE ENCOUNTER
Pt called on yesterday but further explained her L/leg was swollen and she thinks she may have injured the knee in the tub. Pain is currently 10/10. Sh has to stand sometimes to alleviate the pain. She iced and takes the Mobic and rub w/ bengay.

## 2020-09-03 NOTE — PROGRESS NOTES
"Subjective:       Patient ID: Urmila Martínez is a 75 y.o. female.    Chief Complaint: Gait Problem    HPI    75yoF H/o OA of mult joints for same day visit. PCP Dr. Garcia.    Left knee pain. Hurt knee after getting into bathtub. She was flexing her knee getting into the tub and then felt a pull on outside portion of knee when calf made contact with hamstring. This was "two weeks ago atleast." has had chronic knee pains, worse left than right. This original pulling pain resolved in a few days, intermittent pain since.   "feels heavy, achy" worse when bends/flexes knee. When sits for extended periods of time will feel achy and has to move positions. Swelling in left knee.     Sees Dr. Figueroa for arthritis in multiple joints. mobic as needed. Sees hand specialist. Wants to see orthopedist for knees.     Review of Systems   Musculoskeletal: Positive for arthralgias and joint swelling.         Past Medical History:   Diagnosis Date    Arthritis     Asymptomatic PVCs     Benign liver cyst 09/26/2012    Fibrocystic breast 1995    left    Fibrocystic breast 1997    right    Joint pain     Kidney stones 07/20/2012    NS (nuclear sclerosis) 7/19/2013    Osteopenia     Raynaud's disease     Rheumatoid arthritis(714.0)     Sjogren's disease         Prior to Admission medications    Medication Sig Start Date End Date Taking? Authorizing Provider   alendronate (FOSAMAX) 70 MG tablet Take 1 tablet (70 mg total) by mouth every 7 days. 3/5/20 3/5/21  Jonathan Garcia MD   ascorbic acid (VITAMIN C) 500 MG tablet Take 500 mg by mouth once daily.    Historical Provider, MD   Aspirin Child 81 mg Chew Take by mouth. 1 Tablet, Chewable Oral 4x times weekly    Historical Provider, MD   atorvastatin (LIPITOR) 20 MG tablet Take 1 tablet (20 mg total) by mouth once daily. 8/11/20   Jonathan Garcia MD   calcium citrate-vitamin D3 315-200 mg (CITRACAL+D) 315-200 mg-unit per tablet Take 1 tablet by mouth 2 (two) times daily.  "   Historical Provider, MD   cevimeline (EVOXAC) 30 mg capsule Take 1 capsule (30 mg total) by mouth 2 (two) times daily. 8/11/20   Dann Figueroa MD   FLUAD 4072-0259, 65 YR UP,,PF, 45 mcg (15 mcg x 3)/0.5 mL Syrg  9/10/19   Historical Provider, MD   fluticasone (FLONASE) 50 mcg/actuation nasal spray 1  Spray each nostril Every day 12/7/15   Nicole Ramos MD   hydrOXYchloroQUINE (PLAQUENIL) 200 mg tablet TAKE 2 TABLETS ONCE DAILY 8/11/20   Dann Figueroa MD   meloxicam (MOBIC) 15 MG tablet Take 1 tablet (15 mg total) by mouth once daily. 8/11/20 8/11/21  Dann Figueroa MD   methylPREDNISolone (MEDROL DOSEPACK) 4 mg tablet use as directed 3/24/20   Dann Figueroa MD        Past medical history, surgical history, and family medical history reviewed and updated as appropriate.    Medications and allergies reviewed.     Objective:          Vitals:    09/03/20 1627   BP: 120/76   Pulse: 78   SpO2: 97%   Weight: 79.3 kg (174 lb 13.2 oz)     Body mass index is 29.09 kg/m².  Physical Exam  Vitals signs and nursing note reviewed.   Constitutional:       General: She is not in acute distress.     Appearance: She is obese.   Musculoskeletal:      Left knee: She exhibits swelling and effusion. She exhibits normal range of motion and no erythema.      Comments: Mild tenderness upon palpation of patella but none otherwise. Stable knee with ant/post drawer. No valgus/varus abnormality. No calf pain.   Neurological:      Mental Status: She is alert and oriented to person, place, and time.   Psychiatric:         Mood and Affect: Mood normal.         Behavior: Behavior normal.         Lab Results   Component Value Date    WBC 5.66 10/07/2019    HGB 13.2 10/07/2019    HCT 39.5 10/07/2019     10/07/2019    CHOL 160 07/17/2018    TRIG 113 07/17/2018    HDL 44 07/17/2018    ALT 13 10/07/2019    AST 21 10/07/2019     10/07/2019    K 4.6 10/07/2019     10/07/2019    CREATININE 0.9 10/07/2019    BUN 16  10/07/2019    CO2 27 10/07/2019    TSH 1.690 04/09/2019       Assessment:       1. Chronic pain of left knee        Plan:   Urmila was seen today for gait problem.    Diagnoses and all orders for this visit:    Likely acute on chronic oa flare but could have some tendon/ligament straining. Will obtain xray today. rec topical gel for antiinflammatory use. Ortho referral given chronic nature and possible future need of further imaging and evaluation. Appreciate their input in this case. Pt has follow u with pcp (already scheduled checkup) but can serve as follow up of this acute on chronic pain as well.     Chronic pain of left knee  -     X-Ray Knee Complete 4 or More Views Left; Future  -     Ambulatory referral/consult to Orthopedics; Future  -     diclofenac sodium (VOLTAREN) 1 % Gel; Apply 2 g topically once daily.        No follow-ups on file.    Camilo Lawrence MD  Family Medicine  [facility]  9/3/2020

## 2020-09-09 ENCOUNTER — OFFICE VISIT (OUTPATIENT)
Dept: INTERNAL MEDICINE | Facility: CLINIC | Age: 75
End: 2020-09-09
Payer: MEDICARE

## 2020-09-09 VITALS
SYSTOLIC BLOOD PRESSURE: 111 MMHG | HEART RATE: 69 BPM | BODY MASS INDEX: 28.66 KG/M2 | DIASTOLIC BLOOD PRESSURE: 84 MMHG | OXYGEN SATURATION: 96 % | HEIGHT: 65 IN | WEIGHT: 172 LBS

## 2020-09-09 DIAGNOSIS — K21.9 GASTROESOPHAGEAL REFLUX DISEASE WITHOUT ESOPHAGITIS: ICD-10-CM

## 2020-09-09 DIAGNOSIS — G56.02 CARPAL TUNNEL SYNDROME OF LEFT WRIST: ICD-10-CM

## 2020-09-09 DIAGNOSIS — M25.562 CHRONIC PAIN OF LEFT KNEE: Primary | ICD-10-CM

## 2020-09-09 DIAGNOSIS — M67.40 GANGLION CYST: ICD-10-CM

## 2020-09-09 DIAGNOSIS — M35.00 SJOGREN'S SYNDROME, WITH UNSPECIFIED ORGAN INVOLVEMENT: ICD-10-CM

## 2020-09-09 DIAGNOSIS — M05.79 RHEUMATOID ARTHRITIS INVOLVING MULTIPLE SITES WITH POSITIVE RHEUMATOID FACTOR: ICD-10-CM

## 2020-09-09 DIAGNOSIS — I70.0 AORTIC ATHEROSCLEROSIS: ICD-10-CM

## 2020-09-09 DIAGNOSIS — G89.29 CHRONIC PAIN OF LEFT KNEE: Primary | ICD-10-CM

## 2020-09-09 DIAGNOSIS — Z12.31 ENCOUNTER FOR SCREENING MAMMOGRAM FOR MALIGNANT NEOPLASM OF BREAST: ICD-10-CM

## 2020-09-09 DIAGNOSIS — M15.9 PRIMARY OSTEOARTHRITIS INVOLVING MULTIPLE JOINTS: ICD-10-CM

## 2020-09-09 PROCEDURE — 3079F PR MOST RECENT DIASTOLIC BLOOD PRESSURE 80-89 MM HG: ICD-10-PCS | Mod: HCNC,CPTII,S$GLB, | Performed by: INTERNAL MEDICINE

## 2020-09-09 PROCEDURE — 99999 PR PBB SHADOW E&M-EST. PATIENT-LVL V: CPT | Mod: PBBFAC,HCNC,, | Performed by: INTERNAL MEDICINE

## 2020-09-09 PROCEDURE — 99214 OFFICE O/P EST MOD 30 MIN: CPT | Mod: HCNC,S$GLB,, | Performed by: INTERNAL MEDICINE

## 2020-09-09 PROCEDURE — 3074F SYST BP LT 130 MM HG: CPT | Mod: HCNC,CPTII,S$GLB, | Performed by: INTERNAL MEDICINE

## 2020-09-09 PROCEDURE — 1159F PR MEDICATION LIST DOCUMENTED IN MEDICAL RECORD: ICD-10-PCS | Mod: HCNC,S$GLB,, | Performed by: INTERNAL MEDICINE

## 2020-09-09 PROCEDURE — 3079F DIAST BP 80-89 MM HG: CPT | Mod: HCNC,CPTII,S$GLB, | Performed by: INTERNAL MEDICINE

## 2020-09-09 PROCEDURE — 1157F PR ADVANCE CARE PLAN OR EQUIV PRESENT IN MEDICAL RECORD: ICD-10-PCS | Mod: HCNC,S$GLB,, | Performed by: INTERNAL MEDICINE

## 2020-09-09 PROCEDURE — 3288F FALL RISK ASSESSMENT DOCD: CPT | Mod: HCNC,CPTII,S$GLB, | Performed by: INTERNAL MEDICINE

## 2020-09-09 PROCEDURE — 99999 PR PBB SHADOW E&M-EST. PATIENT-LVL V: ICD-10-PCS | Mod: PBBFAC,HCNC,, | Performed by: INTERNAL MEDICINE

## 2020-09-09 PROCEDURE — 1101F PR PT FALLS ASSESS DOC 0-1 FALLS W/OUT INJ PAST YR: ICD-10-PCS | Mod: HCNC,CPTII,S$GLB, | Performed by: INTERNAL MEDICINE

## 2020-09-09 PROCEDURE — 99214 PR OFFICE/OUTPT VISIT, EST, LEVL IV, 30-39 MIN: ICD-10-PCS | Mod: HCNC,S$GLB,, | Performed by: INTERNAL MEDICINE

## 2020-09-09 PROCEDURE — 1159F MED LIST DOCD IN RCRD: CPT | Mod: HCNC,S$GLB,, | Performed by: INTERNAL MEDICINE

## 2020-09-09 PROCEDURE — 3008F BODY MASS INDEX DOCD: CPT | Mod: HCNC,CPTII,S$GLB, | Performed by: INTERNAL MEDICINE

## 2020-09-09 PROCEDURE — 3288F PR FALLS RISK ASSESSMENT DOCUMENTED: ICD-10-PCS | Mod: HCNC,CPTII,S$GLB, | Performed by: INTERNAL MEDICINE

## 2020-09-09 PROCEDURE — 99499 RISK ADDL DX/OHS AUDIT: ICD-10-PCS | Mod: HCNC,S$GLB,, | Performed by: INTERNAL MEDICINE

## 2020-09-09 PROCEDURE — 1101F PT FALLS ASSESS-DOCD LE1/YR: CPT | Mod: HCNC,CPTII,S$GLB, | Performed by: INTERNAL MEDICINE

## 2020-09-09 PROCEDURE — 3074F PR MOST RECENT SYSTOLIC BLOOD PRESSURE < 130 MM HG: ICD-10-PCS | Mod: HCNC,CPTII,S$GLB, | Performed by: INTERNAL MEDICINE

## 2020-09-09 PROCEDURE — 1157F ADVNC CARE PLAN IN RCRD: CPT | Mod: HCNC,S$GLB,, | Performed by: INTERNAL MEDICINE

## 2020-09-09 PROCEDURE — 99499 UNLISTED E&M SERVICE: CPT | Mod: HCNC,S$GLB,, | Performed by: INTERNAL MEDICINE

## 2020-09-09 PROCEDURE — 3008F PR BODY MASS INDEX (BMI) DOCUMENTED: ICD-10-PCS | Mod: HCNC,CPTII,S$GLB, | Performed by: INTERNAL MEDICINE

## 2020-09-09 RX ORDER — DICLOFENAC SODIUM 10 MG/G
2 GEL TOPICAL 3 TIMES DAILY
Qty: 350 G | Refills: 1 | Status: SHIPPED | OUTPATIENT
Start: 2020-09-09 | End: 2020-12-24

## 2020-09-11 ENCOUNTER — PATIENT OUTREACH (OUTPATIENT)
Dept: ADMINISTRATIVE | Facility: OTHER | Age: 75
End: 2020-09-11

## 2020-09-11 ENCOUNTER — OFFICE VISIT (OUTPATIENT)
Dept: ORTHOPEDICS | Facility: CLINIC | Age: 75
End: 2020-09-11
Payer: MEDICARE

## 2020-09-11 DIAGNOSIS — G89.29 CHRONIC PAIN OF LEFT KNEE: ICD-10-CM

## 2020-09-11 DIAGNOSIS — M54.32 SCIATICA OF LEFT SIDE: Primary | ICD-10-CM

## 2020-09-11 DIAGNOSIS — M25.562 CHRONIC PAIN OF LEFT KNEE: ICD-10-CM

## 2020-09-11 PROCEDURE — 1159F PR MEDICATION LIST DOCUMENTED IN MEDICAL RECORD: ICD-10-PCS | Mod: HCNC,S$GLB,, | Performed by: NURSE PRACTITIONER

## 2020-09-11 PROCEDURE — 1101F PR PT FALLS ASSESS DOC 0-1 FALLS W/OUT INJ PAST YR: ICD-10-PCS | Mod: HCNC,CPTII,S$GLB, | Performed by: NURSE PRACTITIONER

## 2020-09-11 PROCEDURE — 1125F AMNT PAIN NOTED PAIN PRSNT: CPT | Mod: HCNC,S$GLB,, | Performed by: NURSE PRACTITIONER

## 2020-09-11 PROCEDURE — 1159F MED LIST DOCD IN RCRD: CPT | Mod: HCNC,S$GLB,, | Performed by: NURSE PRACTITIONER

## 2020-09-11 PROCEDURE — 99999 PR PBB SHADOW E&M-EST. PATIENT-LVL III: CPT | Mod: PBBFAC,HCNC,, | Performed by: NURSE PRACTITIONER

## 2020-09-11 PROCEDURE — 1101F PT FALLS ASSESS-DOCD LE1/YR: CPT | Mod: HCNC,CPTII,S$GLB, | Performed by: NURSE PRACTITIONER

## 2020-09-11 PROCEDURE — 1125F PR PAIN SEVERITY QUANTIFIED, PAIN PRESENT: ICD-10-PCS | Mod: HCNC,S$GLB,, | Performed by: NURSE PRACTITIONER

## 2020-09-11 PROCEDURE — 99203 PR OFFICE/OUTPT VISIT, NEW, LEVL III, 30-44 MIN: ICD-10-PCS | Mod: HCNC,S$GLB,, | Performed by: NURSE PRACTITIONER

## 2020-09-11 PROCEDURE — 99999 PR PBB SHADOW E&M-EST. PATIENT-LVL III: ICD-10-PCS | Mod: PBBFAC,HCNC,, | Performed by: NURSE PRACTITIONER

## 2020-09-11 PROCEDURE — 99203 OFFICE O/P NEW LOW 30 MIN: CPT | Mod: HCNC,S$GLB,, | Performed by: NURSE PRACTITIONER

## 2020-09-11 RX ORDER — METHYLPREDNISOLONE 4 MG/1
TABLET ORAL
Qty: 1 PACKAGE | Refills: 0 | Status: SHIPPED | OUTPATIENT
Start: 2020-09-11 | End: 2020-10-02

## 2020-09-11 NOTE — PROGRESS NOTES
Chart reviewed.   Immunizations: updated  Orders placed: n/a  Upcoming appts to satisfy GONZALO topics: Mammogram 11/16

## 2020-09-11 NOTE — PROGRESS NOTES
CC: Pain of the Right Knee and Pain of the Left Knee      HPI: Pt with c/o left knee pain which radiates to the thigh, buttock, and down to the calf intermittently for the past few weeks. She was seen by her pcp and had an xray which did not show any obvious cause for her pain.other than chondromalacia patella, but her pain is to the lateral knee. She was given a prescription for mobic by Dr. Figueroa, but that has only helped minimally. The pain is worse when she sits and lays down. Her right knee has started aching which she attributes to favoring her left leg. She is the primary caregiver for her . They live in assisted living now. She is ambulating without assistive device. There is not a limp.    ROS  General: denies fever and chills  Resp: no c/o sob  CVS: no c/o cp  MSK: c/o left knee pain which is aching, sharp and shooting to the lateral knee and aching pain to the right knee     PE  General: AAOx3, pleasant and cooperative  Resp: respirations even and unlabored  MSK: left knee exam  0 degrees extension  120 degrees flexion  No warmth or erythema   - effusion  - crepitus  - pain over the joint line    Xray:  Reviewed by me with the patient: Right: No fracture dislocation bone destruction or OCD seen.     Left: No fracture dislocation bone destruction or OCD seen.  There is chondromalacia patella    Assessment:  Left knee pain  Sciatica, left    Plan:  Steroid dose pack for sciatica and knee pain  Continue mobic   If no improvement, will refer to back and spine  RICE  F/u as needed

## 2020-09-11 NOTE — LETTER
September 13, 2020      Camilo Lawrence MD  [address] [address]           [facility] - Orthopedics 5th Fl  [address]  [facility] [address]  Phone: [phone]          Patient: Urmila Martínez   MR Number: [id number]   YOB: 1945   Date of Visit: 9/11/2020       Dear Dr. Camilo Lawrence:    Thank you for referring Urmila Martínez to me for evaluation. Attached you will find relevant portions of my assessment and plan of care.    If you have questions, please do not hesitate to call me. I look forward to following Urmila Martínez along with you.    Sincerely,    Juliet Workman, NP    Enclosure  CC:  No Recipients    If you would like to receive this communication electronically, please contact [email or web address] or [phone] to request more information on [employer] Link access.    For providers and/or their staff who would like to refer a patient to [facility], please contact us through our one-stop-shop provider referral line, [facility], at [phone].    If you feel you have received this communication in error or would no longer like to receive these types of communications, please e-mail [email or web address]

## 2020-09-17 ENCOUNTER — PATIENT MESSAGE (OUTPATIENT)
Dept: ORTHOPEDICS | Facility: CLINIC | Age: 75
End: 2020-09-17

## 2020-09-22 ENCOUNTER — LAB VISIT (OUTPATIENT)
Dept: LAB | Facility: OTHER | Age: 75
End: 2020-09-22
Payer: MEDICARE

## 2020-09-22 DIAGNOSIS — Z03.818 ENCOUNTER FOR OBSERVATION FOR SUSPECTED EXPOSURE TO OTHER BIOLOGICAL AGENTS RULED OUT: ICD-10-CM

## 2020-09-22 PROCEDURE — U0003 INFECTIOUS AGENT DETECTION BY NUCLEIC ACID (DNA OR RNA); SEVERE ACUTE RESPIRATORY SYNDROME CORONAVIRUS 2 (SARS-COV-2) (CORONAVIRUS DISEASE [COVID-19]), AMPLIFIED PROBE TECHNIQUE, MAKING USE OF HIGH THROUGHPUT TECHNOLOGIES AS DESCRIBED BY CMS-2020-01-R: HCPCS | Mod: HCNC

## 2020-09-24 LAB — SARS-COV-2 RNA RESP QL NAA+PROBE: NOT DETECTED

## 2020-09-30 ENCOUNTER — PATIENT MESSAGE (OUTPATIENT)
Dept: ORTHOPEDICS | Facility: CLINIC | Age: 75
End: 2020-09-30

## 2020-10-16 NOTE — TELEPHONE ENCOUNTER
----- Message from Brigida Aguiar sent at 12/10/2018 11:28 AM CST -----  Contact: [phone]  Patient is returning a phone call.  Who left a message for the patient: Bree  Does patient know what this is regarding:    Comments: please advise, thanks   pleasant, well nourished, well developed, in no acute distress , normal communication ability

## 2020-10-19 ENCOUNTER — TELEPHONE (OUTPATIENT)
Dept: ORTHOPEDICS | Facility: CLINIC | Age: 75
End: 2020-10-19

## 2020-10-19 ENCOUNTER — PATIENT MESSAGE (OUTPATIENT)
Dept: ORTHOPEDICS | Facility: CLINIC | Age: 75
End: 2020-10-19

## 2020-10-19 DIAGNOSIS — M50.30 DDD (DEGENERATIVE DISC DISEASE), CERVICAL: Primary | ICD-10-CM

## 2020-10-20 ENCOUNTER — PATIENT OUTREACH (OUTPATIENT)
Dept: ADMINISTRATIVE | Facility: OTHER | Age: 75
End: 2020-10-20

## 2020-10-20 NOTE — PROGRESS NOTES
Health Maintenance Due   Topic Date Due    Colorectal Cancer Screening  10/20/2020    Mammogram  11/05/2020     Updates were requested from care everywhere.  Chart was reviewed for overdue Proactive [facility] Encounters ([facility]) topics (CRS, Breast Cancer Screening, Eye exam)  Health Maintenance has been updated.  LINKS immunization registry triggered.  Immunizations were reconciled.

## 2020-10-21 ENCOUNTER — OFFICE VISIT (OUTPATIENT)
Dept: ORTHOPEDICS | Facility: CLINIC | Age: 75
End: 2020-10-21
Attending: ORTHOPAEDIC SURGERY
Payer: MEDICARE

## 2020-10-21 VITALS — BODY MASS INDEX: 28.66 KG/M2 | WEIGHT: 172 LBS | HEIGHT: 65 IN

## 2020-10-21 DIAGNOSIS — G56.02 CARPAL TUNNEL SYNDROME OF LEFT WRIST: Primary | ICD-10-CM

## 2020-10-21 PROCEDURE — 20526 PR INJECT CARPAL TUNNEL: ICD-10-PCS | Mod: HCNC,LT,S$GLB, | Performed by: ORTHOPAEDIC SURGERY

## 2020-10-21 PROCEDURE — 99999 PR PBB SHADOW E&M-EST. PATIENT-LVL III: CPT | Mod: PBBFAC,HCNC,, | Performed by: ORTHOPAEDIC SURGERY

## 2020-10-21 PROCEDURE — 1101F PT FALLS ASSESS-DOCD LE1/YR: CPT | Mod: HCNC,CPTII,S$GLB, | Performed by: ORTHOPAEDIC SURGERY

## 2020-10-21 PROCEDURE — 99213 PR OFFICE/OUTPT VISIT, EST, LEVL III, 20-29 MIN: ICD-10-PCS | Mod: HCNC,25,S$GLB, | Performed by: ORTHOPAEDIC SURGERY

## 2020-10-21 PROCEDURE — 3008F BODY MASS INDEX DOCD: CPT | Mod: HCNC,CPTII,S$GLB, | Performed by: ORTHOPAEDIC SURGERY

## 2020-10-21 PROCEDURE — 1101F PR PT FALLS ASSESS DOC 0-1 FALLS W/OUT INJ PAST YR: ICD-10-PCS | Mod: HCNC,CPTII,S$GLB, | Performed by: ORTHOPAEDIC SURGERY

## 2020-10-21 PROCEDURE — 20526 THER INJECTION CARP TUNNEL: CPT | Mod: HCNC,LT,S$GLB, | Performed by: ORTHOPAEDIC SURGERY

## 2020-10-21 PROCEDURE — 3008F PR BODY MASS INDEX (BMI) DOCUMENTED: ICD-10-PCS | Mod: HCNC,CPTII,S$GLB, | Performed by: ORTHOPAEDIC SURGERY

## 2020-10-21 PROCEDURE — 1159F PR MEDICATION LIST DOCUMENTED IN MEDICAL RECORD: ICD-10-PCS | Mod: HCNC,S$GLB,, | Performed by: ORTHOPAEDIC SURGERY

## 2020-10-21 PROCEDURE — 1159F MED LIST DOCD IN RCRD: CPT | Mod: HCNC,S$GLB,, | Performed by: ORTHOPAEDIC SURGERY

## 2020-10-21 PROCEDURE — 99999 PR PBB SHADOW E&M-EST. PATIENT-LVL III: ICD-10-PCS | Mod: PBBFAC,HCNC,, | Performed by: ORTHOPAEDIC SURGERY

## 2020-10-21 PROCEDURE — 99213 OFFICE O/P EST LOW 20 MIN: CPT | Mod: HCNC,25,S$GLB, | Performed by: ORTHOPAEDIC SURGERY

## 2020-10-21 PROCEDURE — 1125F PR PAIN SEVERITY QUANTIFIED, PAIN PRESENT: ICD-10-PCS | Mod: HCNC,S$GLB,, | Performed by: ORTHOPAEDIC SURGERY

## 2020-10-21 PROCEDURE — 1125F AMNT PAIN NOTED PAIN PRSNT: CPT | Mod: HCNC,S$GLB,, | Performed by: ORTHOPAEDIC SURGERY

## 2020-10-21 RX ORDER — TRIAMCINOLONE ACETONIDE 40 MG/ML
20 INJECTION, SUSPENSION INTRA-ARTICULAR; INTRAMUSCULAR
Status: COMPLETED | OUTPATIENT
Start: 2020-10-21 | End: 2020-10-21

## 2020-10-21 RX ADMIN — TRIAMCINOLONE ACETONIDE 20 MG: 40 INJECTION, SUSPENSION INTRA-ARTICULAR; INTRAMUSCULAR at 03:10

## 2020-10-21 NOTE — PROGRESS NOTES
Subjective:      Patient ID: Urmila Martínez is a 75 y.o. female.  Chief Complaint: Numbness, Cyst, and Pain of the Right Hand and Numbness of the Left Hand      HPI  Urmila Martínez is a  75 y.o. female presenting today for follow up of bilateral hand symptoms.  She reports that she is having numbness tingling in the left hand and some mild pain in the right hand  Previously she had been scheduled for surgery earlier this year for excision cyst from the right wrist although her pain improved and she canceled the surgery she is not sure if she wants to reschedule for the sister not.    Review of patient's allergies indicates:   Allergen Reactions    Doxycycline      Other reaction(s): vomit  Other reaction(s): Hives    Sulfa (sulfonamide antibiotics) Nausea And Vomiting         Current Outpatient Medications   Medication Sig Dispense Refill    alendronate (FOSAMAX) 70 MG tablet Take 1 tablet (70 mg total) by mouth every 7 days. 12 tablet 3    ascorbic acid (VITAMIN C) 500 MG tablet Take 500 mg by mouth once daily.      Aspirin Child 81 mg Chew Take by mouth. 1 Tablet, Chewable Oral 4x times weekly      atorvastatin (LIPITOR) 20 MG tablet Take 1 tablet (20 mg total) by mouth once daily. 90 tablet 1    calcium citrate-vitamin D3 315-200 mg (CITRACAL+D) 315-200 mg-unit per tablet Take 1 tablet by mouth 2 (two) times daily.      cevimeline (EVOXAC) 30 mg capsule Take 1 capsule (30 mg total) by mouth 2 (two) times daily. 180 capsule 3    diclofenac sodium (VOLTAREN) 1 % Gel Apply 2 g topically 3 (three) times daily. 350 g 1    fluticasone (FLONASE) 50 mcg/actuation nasal spray 1  Spray each nostril Every day 3 Bottle 3    hydrOXYchloroQUINE (PLAQUENIL) 200 mg tablet TAKE 2 TABLETS ONCE DAILY 180 tablet 3    meloxicam (MOBIC) 15 MG tablet Take 1 tablet (15 mg total) by mouth once daily. 90 tablet 3    FLUAD 6776-4069, 65 YR UP,,PF, 45 mcg (15 mcg x 3)/0.5 mL Syrg       methylPREDNISolone (MEDROL DOSEPACK) 4 mg  "tablet use as directed (Patient not taking: Reported on 10/21/2020) 21 tablet 0     Current Facility-Administered Medications   Medication Dose Route Frequency Provider Last Rate Last Dose    [COMPLETED] triamcinolone acetonide injection 20 mg  20 mg INTRABURSAL 1 time in Clinic/HOD Toño Easley Jr., MD   20 mg at 10/21/20 1500       Past Medical History:   Diagnosis Date    Arthritis     Asymptomatic PVCs     Benign liver cyst 09/26/2012    Fibrocystic breast 1995    left    Fibrocystic breast 1997    right    Joint pain     Kidney stones 07/20/2012    NS (nuclear sclerosis) 7/19/2013    Osteopenia     Raynaud's disease     Rheumatoid arthritis(714.0)     Sjogren's disease        Past Surgical History:   Procedure Laterality Date    BREAST BIOPSY Bilateral     2 times - core needle right breast, biopsy left breast: fibrocystic findings    BREAST SURGERY  [facility] records    Biopsy benign    CHOLECYSTECTOMY  1990's     COLON SURGERY      Benign polyps removed in colonoscopy exams    COLONOSCOPY N/A 10/20/2015    Procedure: COLONOSCOPY;  Surgeon: SHARRON Mcdonnell MD;  Location: [address]);  Service: Endoscopy;  Laterality: N/A;    CYST REMOVAL      right ankle - benign cyst removed at 7yrs old     CYST REMOVAL  about 2010    cyst removed from forehead     FRACTURE SURGERY  Childhood    Broke both wrists on two ocassions grade and highschool    TONSILLECTOMY, ADENOIDECTOMY      during childhood        OBJECTIVE:   PHYSICAL EXAM:  Height: 5' 5" (165.1 cm) Weight: 78 kg (172 lb)  Vitals:    10/21/20 1430   Weight: 78 kg (172 lb)   Height: 5' 5" (1.651 m)   PainSc: 10-Worst pain ever     Ortho/SPM Exam  Examination of the right hand demonstrates a Polina multi-lobulated cyst on the dorsal ulnar side of the wrist near the ulnar head and ulnar styloid area not really tender range of motion of his fingers full  No pain with ulnar deviation  Left hand demonstrates mildly positive Tinel sign at " the wrist Phalen's test is positive as well  No atrophy noted    RADIOGRAPHS:  None  Comments: I have personally reviewed the imaging and I agree with the above radiologist's report.    ASSESSMENT/PLAN:     IMPRESSION:  1.  Left carpal tunnel syndrome.  2.  Ganglion cyst right wrist distal ulna    PLAN:  We discussed surgery for the right wrist but she wants to hold off on that  However for the left hand like to try injection today  After pause for time-out identified the left carpal tunnel injected with Kenalog 20 mg 0.5 cc xylocaine sterile technique  She tolerated the procedure well without complication  Continue wrist splint at night  Follow up 1-2 months    FOLLOW UP:  1-2 months    Disclaimer: This note has been generated using voice-recognition software. There may be typographical errors that have been missed during proof-reading.

## 2020-10-26 ENCOUNTER — HOSPITAL ENCOUNTER (OUTPATIENT)
Dept: RADIOLOGY | Facility: HOSPITAL | Age: 75
Discharge: HOME | End: 2020-10-26
Attending: PHYSICIAN ASSISTANT
Payer: MEDICARE

## 2020-10-26 ENCOUNTER — OFFICE VISIT (OUTPATIENT)
Dept: ORTHOPEDICS | Facility: CLINIC | Age: 75
End: 2020-10-26
Payer: MEDICARE

## 2020-10-26 VITALS — HEIGHT: 65 IN | WEIGHT: 169.75 LBS | BODY MASS INDEX: 28.28 KG/M2

## 2020-10-26 DIAGNOSIS — M25.562 CHRONIC PAIN OF LEFT KNEE: ICD-10-CM

## 2020-10-26 DIAGNOSIS — M50.30 DDD (DEGENERATIVE DISC DISEASE), CERVICAL: ICD-10-CM

## 2020-10-26 DIAGNOSIS — M54.16 LUMBAR RADICULOPATHY: Primary | ICD-10-CM

## 2020-10-26 DIAGNOSIS — G89.29 CHRONIC PAIN OF LEFT KNEE: ICD-10-CM

## 2020-10-26 PROCEDURE — 72050 XR CERVICAL SPINE AP LAT WITH FLEX EXTEN: ICD-10-PCS | Mod: 26,HCNC,, | Performed by: RADIOLOGY

## 2020-10-26 PROCEDURE — 72050 X-RAY EXAM NECK SPINE 4/5VWS: CPT | Mod: TC,HCNC

## 2020-10-26 PROCEDURE — 1126F PR PAIN SEVERITY QUANTIFIED, NO PAIN PRESENT: ICD-10-PCS | Mod: HCNC,S$GLB,, | Performed by: PHYSICIAN ASSISTANT

## 2020-10-26 PROCEDURE — 1159F MED LIST DOCD IN RCRD: CPT | Mod: HCNC,S$GLB,, | Performed by: PHYSICIAN ASSISTANT

## 2020-10-26 PROCEDURE — 1159F PR MEDICATION LIST DOCUMENTED IN MEDICAL RECORD: ICD-10-PCS | Mod: HCNC,S$GLB,, | Performed by: PHYSICIAN ASSISTANT

## 2020-10-26 PROCEDURE — 1101F PR PT FALLS ASSESS DOC 0-1 FALLS W/OUT INJ PAST YR: ICD-10-PCS | Mod: HCNC,CPTII,S$GLB, | Performed by: PHYSICIAN ASSISTANT

## 2020-10-26 PROCEDURE — 1126F AMNT PAIN NOTED NONE PRSNT: CPT | Mod: HCNC,S$GLB,, | Performed by: PHYSICIAN ASSISTANT

## 2020-10-26 PROCEDURE — 99999 PR PBB SHADOW E&M-EST. PATIENT-LVL IV: ICD-10-PCS | Mod: PBBFAC,HCNC,, | Performed by: PHYSICIAN ASSISTANT

## 2020-10-26 PROCEDURE — 99999 PR PBB SHADOW E&M-EST. PATIENT-LVL IV: CPT | Mod: PBBFAC,HCNC,, | Performed by: PHYSICIAN ASSISTANT

## 2020-10-26 PROCEDURE — 72050 X-RAY EXAM NECK SPINE 4/5VWS: CPT | Mod: 26,HCNC,, | Performed by: RADIOLOGY

## 2020-10-26 PROCEDURE — 1101F PT FALLS ASSESS-DOCD LE1/YR: CPT | Mod: HCNC,CPTII,S$GLB, | Performed by: PHYSICIAN ASSISTANT

## 2020-10-26 PROCEDURE — 99214 PR OFFICE/OUTPT VISIT, EST, LEVL IV, 30-39 MIN: ICD-10-PCS | Mod: HCNC,S$GLB,, | Performed by: PHYSICIAN ASSISTANT

## 2020-10-26 PROCEDURE — 3008F PR BODY MASS INDEX (BMI) DOCUMENTED: ICD-10-PCS | Mod: HCNC,CPTII,S$GLB, | Performed by: PHYSICIAN ASSISTANT

## 2020-10-26 PROCEDURE — 3008F BODY MASS INDEX DOCD: CPT | Mod: HCNC,CPTII,S$GLB, | Performed by: PHYSICIAN ASSISTANT

## 2020-10-26 PROCEDURE — 99214 OFFICE O/P EST MOD 30 MIN: CPT | Mod: HCNC,S$GLB,, | Performed by: PHYSICIAN ASSISTANT

## 2020-10-26 RX ORDER — DICLOFENAC SODIUM 75 MG/1
75 TABLET, DELAYED RELEASE ORAL 2 TIMES DAILY
Qty: 60 TABLET | Refills: 0 | Status: SHIPPED | OUTPATIENT
Start: 2020-10-26 | End: 2020-11-25

## 2020-10-26 RX ORDER — GABAPENTIN 100 MG/1
100 CAPSULE ORAL 3 TIMES DAILY
Qty: 90 CAPSULE | Refills: 0 | Status: SHIPPED | OUTPATIENT
Start: 2020-10-26

## 2020-10-26 NOTE — PROGRESS NOTES
DATE: 10/26/2020  PATIENT: Urmila Martínez    Supervising Physician: Prakash Coulter M.D.    CHIEF COMPLAINT: left knee pain, neck pain, left leg pain    HISTORY:  Urmila Martínez is a 75 y.o. female previously seen by Juliet Workman NP here for initial evaluation of left knee and leg pain (Back - 0, Leg - 0).  The pain in the knee is what bothers her most.  The pain has been present for several weeks. The patient describes the pain as aching and tender to touch.  The pain is mostly in the knee but she says sometimes there is pain in the thigh and sometimes in the calf. The pain is worse with sitting in a car and improved by walking. There is no associated numbness and tingling. There is sometimes subjective weakness, depending on her pain level. Prior treatments have included a medrol dose pack, physical therapy years ago and she sees Dr. Easley for her hands, but no ESIs or surgery.    The patient denies myelopathic symptoms such as handwriting changes or difficulty with buttons/coins/keys. Denies perineal paresthesias, bowel/bladder dysfunction.    PAST MEDICAL/SURGICAL HISTORY:  Past Medical History:   Diagnosis Date    Arthritis     Asymptomatic PVCs     Benign liver cyst 09/26/2012    Fibrocystic breast 1995    left    Fibrocystic breast 1997    right    Joint pain     Kidney stones 07/20/2012    NS (nuclear sclerosis) 7/19/2013    Osteopenia     Raynaud's disease     Rheumatoid arthritis(714.0)     Sjogren's disease      Past Surgical History:   Procedure Laterality Date    BREAST BIOPSY Bilateral     2 times - core needle right breast, biopsy left breast: fibrocystic findings    [facility]    Biopsy benign    CHOLECYSTECTOMY  1990's     COLON SURGERY      Benign polyps removed in colonoscopy exams    COLONOSCOPY N/A 10/20/2015    Procedure: COLONOSCOPY;  Surgeon: SHARRON Mcdonnell MD;  Location: [address]);  Service: Endoscopy;  Laterality: N/A;    CYST  REMOVAL      right ankle - benign cyst removed at 7yrs old     CYST REMOVAL  about 2010    cyst removed from forehead     FRACTURE SURGERY  Childhood    Broke both wrists on two ocassions grade and highschool    TONSILLECTOMY, ADENOIDECTOMY      during childhood        Medications:   Current Outpatient Medications on File Prior to Visit   Medication Sig Dispense Refill    alendronate (FOSAMAX) 70 MG tablet Take 1 tablet (70 mg total) by mouth every 7 days. 12 tablet 3    ascorbic acid (VITAMIN C) 500 MG tablet Take 500 mg by mouth once daily.      Aspirin Child 81 mg Chew Take by mouth. 1 Tablet, Chewable Oral 4x times weekly      atorvastatin (LIPITOR) 20 MG tablet Take 1 tablet (20 mg total) by mouth once daily. 90 tablet 1    calcium citrate-vitamin D3 315-200 mg (CITRACAL+D) 315-200 mg-unit per tablet Take 1 tablet by mouth 2 (two) times daily.      cevimeline (EVOXAC) 30 mg capsule Take 1 capsule (30 mg total) by mouth 2 (two) times daily. 180 capsule 3    diclofenac sodium (VOLTAREN) 1 % Gel Apply 2 g topically 3 (three) times daily. 350 g 1    FLUAD [id number], 65 YR UP,,PF, 45 mcg (15 mcg x 3)/0.5 mL Syrg       fluticasone (FLONASE) 50 mcg/actuation nasal spray 1  Spray each nostril Every day 3 Bottle 3    hydrOXYchloroQUINE (PLAQUENIL) 200 mg tablet TAKE 2 TABLETS ONCE DAILY 180 tablet 3    methylPREDNISolone (MEDROL DOSEPACK) 4 mg tablet use as directed (Patient not taking: Reported on 10/21/2020) 21 tablet 0    [DISCONTINUED] meloxicam (MOBIC) 15 MG tablet Take 1 tablet (15 mg total) by mouth once daily. 90 tablet 3     No current facility-administered medications on file prior to visit.        Social History:   Social History     Socioeconomic History    Marital status:      Spouse name: Not on file    Number of children: 1    Years of education: Not on file    Highest education level: Not on file   Occupational History    Occupation: - retired   Social Needs     Financial resource strain: Not hard at all    Food insecurity     Worry: Never true     Inability: Never true    Transportation needs     Medical: No     Non-medical: No   Tobacco Use    Smoking status: Never Smoker    Smokeless tobacco: Never Used   Substance and Sexual Activity    Alcohol use: Yes     Frequency: Monthly or less     Drinks per session: Patient refused     Binge frequency: Never     Comment: limited- glass a wine a few times a year    Drug use: No    Sexual activity: Never     Partners: Male     Comment:    Lifestyle    Physical activity     Days per week: 4 days     Minutes per session: 50 min    Stress: Not at all   Relationships    Social connections     Talks on phone: Patient refused     Gets together: Patient refused     Attends Scientologist service: Not on file     Active member of club or organization: Yes     Attends meetings of clubs or organizations: More than 4 times per year     Relationship status:    Other Topics Concern    Are you pregnant or think you may be? No    Breast-feeding No   Social History Narrative    , has one daughter who lives in [place]. 3 grandchildren.     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     6/2019 - planning to move into assisted living community with her  who needs significantly more assistance       REVIEW OF SYSTEMS:  Constitution: Negative. Negative for chills, fever and night sweats.   Cardiovascular: Negative for chest pain and syncope.   Respiratory: Negative for cough and shortness of breath.   Gastrointestinal: See HPI. Negative for nausea/vomiting. Negative for abdominal pain.  Genitourinary: See HPI. Negative for discoloration or dysuria.  Skin: Negative for dry skin, itching and rash.   Hematologic/Lymphatic: Negative for bleeding problem. Does not bruise/bleed easily.   Musculoskeletal: Negative for falls and muscle weakness.   Neurological: See HPI. No seizures.   Endocrine: Negative  "for polydipsia, polyphagia and polyuria.   Allergic/Immunologic: Negative for hives and persistent infections.     EXAM:  Ht 5' 5" (1.651 m)   Wt 77 kg (169 lb 12.1 oz)   LMP  (LMP Unknown) Comment: LMP age 38  BMI 28.25 kg/m²     General: The patient is a very pleasant 75 y.o. female in no apparent distress, the patient is oriented to person, place and time.  Psych: Normal mood and affect  HEENT: Vision grossly intact, hearing intact to the spoken word.  Lungs: Respirations unlabored.  Gait: Normal station and gait, no difficulty with toe or heel walk.   Skin: Dorsal lumbar skin negative for rashes, lesions, hairy patches and surgical scars. There is no lumbar tenderness to palpation.  Range of motion: Cervical and lumbar range of motion is acceptable.  Spinal Balance: Global saggital and coronal spinal balance acceptable, not significant for scoliosis and kyphosis.  Musculoskeletal: No pain with the range of motion of the bilateral hips. No trochanteric tenderness to palpation. There is tenderness to light touch over the left knee.  No pain with range of motion of the bilateral shoulders or elbows.   Vascular: Bilateral lower extremities warm and well perfused, dorsalis pedis pulses 2+ bilaterally.  Neurological: Normal strength and tone in all major motor groups in the bilateral upper and lower extremities. Normal sensation to light touch in the C5-T1 and L2-S1 dermatomes bilaterally.  Deep tendon reflexes symmetric 2+ in the bilateral upper and lower extremities.  Negative Babinski bilaterally. Straight leg raise negative bilaterally.    IMAGING:      Today I personally reviewed AP, Lat and Flex/Ex  upright C-spine films that demonstrate C5/6 disc space narrowing.        Body mass index is 28.25 kg/m².    No results found for: HGBA1C        ASSESSMENT/PLAN:    Urmila was seen today for arm pain.    Diagnoses and all orders for this visit:    Lumbar radiculopathy  -     X-Ray Lumbar Spine Ap Lateral w/Flex " Ext; Future  -     Ambulatory referral/consult to Physical/Occupational Therapy; Future    Chronic pain of left knee  -     Ambulatory referral/consult to Physical/Occupational Therapy; Future    Other orders  -     diclofenac (VOLTAREN) 75 MG EC tablet; Take 1 tablet (75 mg total) by mouth 2 (two) times daily.  -     gabapentin (NEURONTIN) 100 MG capsule; Take 1 capsule (100 mg total) by mouth 3 (three) times daily.      Today we discussed at length all of the different treatment options including anti-inflammatories, acetaminophen, rest, ice, heat, physical therapy including strengthening and stretching exercises, home exercises, ROM, aerobic conditioning, aqua therapy, other modalities including ultrasound, massage, and dry needling, epidural steroid injections and finally surgical intervention.      The patient's pain is primarily in the knee.  Referral for PT placed today.  Follow up as needed.

## 2020-10-26 NOTE — LETTER
October 26, 2020      Juliet Workman, CINDY  [address]  [facility] [address]           [id number]  [address]  [facility] [address]  Phone: [phone]          Patient: Urmila Martínez   MR Number: [id number]   YOB: 1945   Date of Visit: 10/26/2020       Dear Juliet Workman:    Thank you for referring Urmila Martínez to me for evaluation. Attached you will find relevant portions of my assessment and plan of care.    If you have questions, please do not hesitate to call me. I look forward to following Urmila Martínez along with you.    Sincerely,    Lacy Griffiths PA-C    Enclosure  CC:  No Recipients    If you would like to receive this communication electronically, please contact [email or web address] or [phone] to request more information on [facility] access.    For providers and/or their staff who would like to refer a patient to [facility], please contact us through our one-stop-shop provider referral line, [facility] , at [phone].    If you feel you have received this communication in error or would no longer like to receive these types of communications, please e-mail [email or web address]

## 2020-10-27 ENCOUNTER — PATIENT MESSAGE (OUTPATIENT)
Dept: INTERNAL MEDICINE | Facility: CLINIC | Age: 75
End: 2020-10-27

## 2020-10-27 ENCOUNTER — PATIENT MESSAGE (OUTPATIENT)
Dept: ORTHOPEDICS | Facility: CLINIC | Age: 75
End: 2020-10-27

## 2020-10-27 DIAGNOSIS — M54.16 LUMBAR RADICULOPATHY: Primary | ICD-10-CM

## 2020-10-27 DIAGNOSIS — G89.29 CHRONIC PAIN OF LEFT KNEE: ICD-10-CM

## 2020-10-27 DIAGNOSIS — M25.562 CHRONIC PAIN OF LEFT KNEE: ICD-10-CM

## 2020-10-28 ENCOUNTER — PATIENT MESSAGE (OUTPATIENT)
Dept: INTERNAL MEDICINE | Facility: CLINIC | Age: 75
End: 2020-10-28

## 2020-10-29 ENCOUNTER — LAB VISIT (OUTPATIENT)
Dept: LAB | Facility: OTHER | Age: 75
End: 2020-10-29
Payer: MEDICARE

## 2020-10-29 DIAGNOSIS — Z03.818 ENCOUNTER FOR OBSERVATION FOR SUSPECTED EXPOSURE TO OTHER BIOLOGICAL AGENTS RULED OUT: ICD-10-CM

## 2020-10-29 PROCEDURE — U0003 INFECTIOUS AGENT DETECTION BY NUCLEIC ACID (DNA OR RNA); SEVERE ACUTE RESPIRATORY SYNDROME CORONAVIRUS 2 (SARS-COV-2) (CORONAVIRUS DISEASE [COVID-19]), AMPLIFIED PROBE TECHNIQUE, MAKING USE OF HIGH THROUGHPUT TECHNOLOGIES AS DESCRIBED BY CMS-2020-01-R: HCPCS | Mod: HCNC

## 2020-10-30 ENCOUNTER — LAB VISIT (OUTPATIENT)
Dept: LAB | Facility: HOSPITAL | Age: 75
End: 2020-10-30
Attending: INTERNAL MEDICINE
Payer: MEDICARE

## 2020-10-30 ENCOUNTER — OFFICE VISIT (OUTPATIENT)
Dept: RHEUMATOLOGY | Facility: CLINIC | Age: 75
End: 2020-10-30
Payer: MEDICARE

## 2020-10-30 VITALS
BODY MASS INDEX: 28.16 KG/M2 | TEMPERATURE: 97 F | SYSTOLIC BLOOD PRESSURE: 122 MMHG | HEIGHT: 65 IN | DIASTOLIC BLOOD PRESSURE: 77 MMHG | HEART RATE: 79 BPM | WEIGHT: 169 LBS

## 2020-10-30 DIAGNOSIS — M35.00 SJOGREN'S SYNDROME, WITH UNSPECIFIED ORGAN INVOLVEMENT: Primary | ICD-10-CM

## 2020-10-30 DIAGNOSIS — M25.462 EFFUSION, LEFT KNEE: ICD-10-CM

## 2020-10-30 DIAGNOSIS — M15.9 PRIMARY OSTEOARTHRITIS INVOLVING MULTIPLE JOINTS: ICD-10-CM

## 2020-10-30 DIAGNOSIS — M35.00 SJOGREN'S SYNDROME, WITH UNSPECIFIED ORGAN INVOLVEMENT: ICD-10-CM

## 2020-10-30 LAB
ALBUMIN SERPL BCP-MCNC: 3.9 G/DL (ref 3.5–5.2)
ALP SERPL-CCNC: 78 U/L (ref 55–135)
ALT SERPL W/O P-5'-P-CCNC: 10 U/L (ref 10–44)
ANION GAP SERPL CALC-SCNC: 12 MMOL/L (ref 8–16)
APPEARANCE FLD: NORMAL
AST SERPL-CCNC: 18 U/L (ref 10–40)
BASOPHILS # BLD AUTO: 0.11 K/UL (ref 0–0.2)
BASOPHILS NFR BLD: 1.1 % (ref 0–1.9)
BILIRUB SERPL-MCNC: 0.7 MG/DL (ref 0.1–1)
BODY FLD TYPE: NORMAL
BODY FLD TYPE: NORMAL
BUN SERPL-MCNC: 22 MG/DL (ref 8–23)
CALCIUM SERPL-MCNC: 9.7 MG/DL (ref 8.7–10.5)
CCP AB SER IA-ACNC: 59.8 U/ML
CHLORIDE SERPL-SCNC: 103 MMOL/L (ref 95–110)
CO2 SERPL-SCNC: 27 MMOL/L (ref 23–29)
COLOR FLD: YELLOW
CREAT SERPL-MCNC: 1.1 MG/DL (ref 0.5–1.4)
CRP SERPL-MCNC: 24.9 MG/L (ref 0–8.2)
CRYSTALS FLD MICRO: NEGATIVE
DIFFERENTIAL METHOD: ABNORMAL
EOSINOPHIL # BLD AUTO: 0 K/UL (ref 0–0.5)
EOSINOPHIL NFR BLD: 0.3 % (ref 0–8)
ERYTHROCYTE [DISTWIDTH] IN BLOOD BY AUTOMATED COUNT: 12.4 % (ref 11.5–14.5)
ERYTHROCYTE [SEDIMENTATION RATE] IN BLOOD BY WESTERGREN METHOD: 16 MM/HR (ref 0–36)
EST. GFR  (AFRICAN AMERICAN): 57.6 ML/MIN/1.73 M^2
EST. GFR  (NON AFRICAN AMERICAN): 49.9 ML/MIN/1.73 M^2
GLUCOSE SERPL-MCNC: 116 MG/DL (ref 70–110)
GRAM STN SPEC: NORMAL
GRAM STN SPEC: NORMAL
HCT VFR BLD AUTO: 40.5 % (ref 37–48.5)
HGB BLD-MCNC: 13 G/DL (ref 12–16)
IMM GRANULOCYTES # BLD AUTO: 0.03 K/UL (ref 0–0.04)
IMM GRANULOCYTES NFR BLD AUTO: 0.3 % (ref 0–0.5)
LYMPHOCYTES # BLD AUTO: 1.5 K/UL (ref 1–4.8)
LYMPHOCYTES NFR BLD: 15.9 % (ref 18–48)
LYMPHOCYTES NFR FLD MANUAL: 21 %
MCH RBC QN AUTO: 30 PG (ref 27–31)
MCHC RBC AUTO-ENTMCNC: 32.1 G/DL (ref 32–36)
MCV RBC AUTO: 93 FL (ref 82–98)
MONOCYTES # BLD AUTO: 0.8 K/UL (ref 0.3–1)
MONOCYTES NFR BLD: 8.4 % (ref 4–15)
MONOS+MACROS NFR FLD MANUAL: 5 %
NEUTROPHILS # BLD AUTO: 7.1 K/UL (ref 1.8–7.7)
NEUTROPHILS NFR BLD: 74 % (ref 38–73)
NEUTROPHILS NFR FLD MANUAL: 74 %
NRBC BLD-RTO: 0 /100 WBC
PLATELET # BLD AUTO: 302 K/UL (ref 150–350)
PMV BLD AUTO: 10.9 FL (ref 9.2–12.9)
POTASSIUM SERPL-SCNC: 4 MMOL/L (ref 3.5–5.1)
PROT SERPL-MCNC: 7.9 G/DL (ref 6–8.4)
RBC # BLD AUTO: 4.34 M/UL (ref 4–5.4)
RHEUMATOID FACT SERPL-ACNC: 166 IU/ML (ref 0–15)
SARS-COV-2 RNA RESP QL NAA+PROBE: NOT DETECTED
SODIUM SERPL-SCNC: 142 MMOL/L (ref 136–145)
WBC # BLD AUTO: 9.64 K/UL (ref 3.9–12.7)
WBC # FLD: 8626 /CU MM

## 2020-10-30 PROCEDURE — 87205 SMEAR GRAM STAIN: CPT | Mod: HCNC

## 2020-10-30 PROCEDURE — 3078F DIAST BP <80 MM HG: CPT | Mod: HCNC,CPTII,S$GLB, | Performed by: INTERNAL MEDICINE

## 2020-10-30 PROCEDURE — 99214 PR OFFICE/OUTPT VISIT, EST, LEVL IV, 30-39 MIN: ICD-10-PCS | Mod: 25,HCNC,S$GLB, | Performed by: INTERNAL MEDICINE

## 2020-10-30 PROCEDURE — 89051 BODY FLUID CELL COUNT: CPT | Mod: HCNC

## 2020-10-30 PROCEDURE — 99999 PR PBB SHADOW E&M-EST. PATIENT-LVL III: ICD-10-PCS | Mod: PBBFAC,HCNC,, | Performed by: INTERNAL MEDICINE

## 2020-10-30 PROCEDURE — 3074F SYST BP LT 130 MM HG: CPT | Mod: HCNC,CPTII,S$GLB, | Performed by: INTERNAL MEDICINE

## 2020-10-30 PROCEDURE — 85025 COMPLETE CBC W/AUTO DIFF WBC: CPT | Mod: HCNC

## 2020-10-30 PROCEDURE — 1101F PR PT FALLS ASSESS DOC 0-1 FALLS W/OUT INJ PAST YR: ICD-10-PCS | Mod: HCNC,CPTII,S$GLB, | Performed by: INTERNAL MEDICINE

## 2020-10-30 PROCEDURE — 86200 CCP ANTIBODY: CPT | Mod: HCNC

## 2020-10-30 PROCEDURE — 86235 NUCLEAR ANTIGEN ANTIBODY: CPT | Mod: 59,HCNC

## 2020-10-30 PROCEDURE — 89060 EXAM SYNOVIAL FLUID CRYSTALS: CPT | Mod: HCNC

## 2020-10-30 PROCEDURE — 80053 COMPREHEN METABOLIC PANEL: CPT | Mod: HCNC

## 2020-10-30 PROCEDURE — 86431 RHEUMATOID FACTOR QUANT: CPT | Mod: HCNC

## 2020-10-30 PROCEDURE — 85652 RBC SED RATE AUTOMATED: CPT | Mod: HCNC

## 2020-10-30 PROCEDURE — 99214 OFFICE O/P EST MOD 30 MIN: CPT | Mod: 25,HCNC,S$GLB, | Performed by: INTERNAL MEDICINE

## 2020-10-30 PROCEDURE — 1101F PT FALLS ASSESS-DOCD LE1/YR: CPT | Mod: HCNC,CPTII,S$GLB, | Performed by: INTERNAL MEDICINE

## 2020-10-30 PROCEDURE — 87070 CULTURE OTHR SPECIMN AEROBIC: CPT | Mod: HCNC

## 2020-10-30 PROCEDURE — 86235 NUCLEAR ANTIGEN ANTIBODY: CPT | Mod: HCNC

## 2020-10-30 PROCEDURE — 20610 DRAIN/INJ JOINT/BURSA W/O US: CPT | Mod: HCNC,LT,S$GLB, | Performed by: INTERNAL MEDICINE

## 2020-10-30 PROCEDURE — 3074F PR MOST RECENT SYSTOLIC BLOOD PRESSURE < 130 MM HG: ICD-10-PCS | Mod: HCNC,CPTII,S$GLB, | Performed by: INTERNAL MEDICINE

## 2020-10-30 PROCEDURE — 20610 LARGE JOINT ASPIRATION/INJECTION: L KNEE: ICD-10-PCS | Mod: HCNC,LT,S$GLB, | Performed by: INTERNAL MEDICINE

## 2020-10-30 PROCEDURE — 86140 C-REACTIVE PROTEIN: CPT | Mod: HCNC

## 2020-10-30 PROCEDURE — 3008F BODY MASS INDEX DOCD: CPT | Mod: HCNC,CPTII,S$GLB, | Performed by: INTERNAL MEDICINE

## 2020-10-30 PROCEDURE — 3008F PR BODY MASS INDEX (BMI) DOCUMENTED: ICD-10-PCS | Mod: HCNC,CPTII,S$GLB, | Performed by: INTERNAL MEDICINE

## 2020-10-30 PROCEDURE — 99999 PR PBB SHADOW E&M-EST. PATIENT-LVL III: CPT | Mod: PBBFAC,HCNC,, | Performed by: INTERNAL MEDICINE

## 2020-10-30 PROCEDURE — 3078F PR MOST RECENT DIASTOLIC BLOOD PRESSURE < 80 MM HG: ICD-10-PCS | Mod: HCNC,CPTII,S$GLB, | Performed by: INTERNAL MEDICINE

## 2020-10-30 PROCEDURE — 1159F PR MEDICATION LIST DOCUMENTED IN MEDICAL RECORD: ICD-10-PCS | Mod: HCNC,S$GLB,, | Performed by: INTERNAL MEDICINE

## 2020-10-30 PROCEDURE — 1159F MED LIST DOCD IN RCRD: CPT | Mod: HCNC,S$GLB,, | Performed by: INTERNAL MEDICINE

## 2020-10-30 PROCEDURE — 36415 COLL VENOUS BLD VENIPUNCTURE: CPT | Mod: HCNC

## 2020-10-30 PROCEDURE — 86038 ANTINUCLEAR ANTIBODIES: CPT | Mod: HCNC

## 2020-10-30 RX ADMIN — TRIAMCINOLONE ACETONIDE 40 MG: 40 INJECTION, SUSPENSION INTRA-ARTICULAR; INTRAMUSCULAR at 02:10

## 2020-10-30 NOTE — PROCEDURES
Large Joint Aspiration/Injection: L knee    Date/Time: 10/30/2020 2:00 PM  Performed by: Dann Figueroa MD  Authorized by: Dann Figueroa MD     Consent Done?:  Yes (Verbal)  Indications:  Joint swelling and pain  Site marked: the procedure site was marked    Prep: patient was prepped and draped in usual sterile fashion      Local anesthesia used?: Yes    Anesthesia:  Local infiltration  Local anesthetic:  Lidocaine 1% without epinephrine  Anesthetic total (ml):  2      Details:  Needle Size:  22 G  Approach:  Medial  Location:  Knee  Site:  L knee  Medications:  40 mg triamcinolone acetonide 40 mg/mL  Aspirate amount (mL):  30  Aspirate:  Yellow  Lab: fluid sent for laboratory analysis    Patient tolerance:  Patient tolerated the procedure well with no immediate complications

## 2020-10-31 LAB
ANTI-SSA ANTIBODY: 0.05 RATIO (ref 0–0.99)
ANTI-SSA INTERPRETATION: NEGATIVE
ANTI-SSB ANTIBODY: 0.06 RATIO (ref 0–0.99)
ANTI-SSB INTERPRETATION: NEGATIVE
PATH INTERP FLD-IMP: NORMAL

## 2020-11-01 ENCOUNTER — PATIENT MESSAGE (OUTPATIENT)
Dept: RHEUMATOLOGY | Facility: CLINIC | Age: 75
End: 2020-11-01

## 2020-11-01 RX ORDER — TRIAMCINOLONE ACETONIDE 40 MG/ML
40 INJECTION, SUSPENSION INTRA-ARTICULAR; INTRAMUSCULAR
Status: DISCONTINUED | OUTPATIENT
Start: 2020-10-30 | End: 2020-11-01 | Stop reason: HOSPADM

## 2020-11-02 LAB
ANA SER QL IF: NORMAL
BACTERIA SPEC AEROBE CULT: NO GROWTH

## 2020-11-02 NOTE — PROGRESS NOTES
History of present illness:  75-year-old female I saw initially in 2001.  She presented at that time with Raynaud's phenomena she had no definite evidence of a connective tissue disease.  She did have osteoarthritis.  I saw her again in 2007.  She is complaining of bilateral shoulder pain which I felt was bursitis.  She had had dryness of her eyes and occasionally her mouth.  She had a positive rheumatoid factor but no inflammatory arthritis.  I diagnosed her as having primary Sjogren syndrome.  Since then she has had an intermittent inflammatory arthritis.  I placed her on Plaquenil.  She has also been taking Evoxac twice daily for dry mouth.  When I saw her last November she did have some swelling of the right 2nd and 3rd MCP but she had normal inflammatory markers.  I may no change in her medications at that time.  It should be noted that her inflammatory markers have always been normal.    She is still having problems with carpal tunnel on her left hand and a ganglion in the right wrist.  She had a carpal tunnel injection which helped.  She then developed pain in the left knee.  Was treated with a Medrol Dosepak, which helped.  It radiated down the leg.  It is worse with driving.  She has had some swelling in the knees and legs.  She has had no numbness in the legs.  She has had no problem in the lower back.  She has 3 hours of morning stiffness.    She has had no fever, headache, rash, conjunctivitis, oral ulcers.  She has had no recent Raynaud's phenomena.  Her dryness has been stable.  She has no pleurisy.  She has had no other medical problems.  Her dryness has been stable.    Physical examination:  Skin:  No rashes  ENT:  Decreased tears in saliva  Chest:  Clear to auscultation and percussion  Cardiac:  No murmurs, gallops, rubs  Abdomen:  No organomegaly or masses.  No tenderness to palpation  Extremities:  No pedal edema  Musculoskeletal:  Cervical spine is unremarkable.  She has decreased range of  motion of the left shoulder but no pain on range of motion.  Right shoulder was unremarkable.  Elbows are unremarkable.  She has a synovial cyst on the ulnar aspect of the right wrist.  It is not tender.  Left wrist is unremarkable.  Small joints of the hands are unremarkable.  She has an effusion in the left knee.  It is tender to palpation.  She also has tenderness of the answering bursa.  She has no tenderness of the right knee.  Right ankle is tender but not swollen.  Left ankle is normal.  Small joints the feet is unremarkable.    Assessment:  I wonder if she is developing an inflammatory arthritis    Plans:  1.  I will see how she does from the injection  2.  Laboratory studies are obtained  3.  Continue diclofenac, Plaquenil, and Evoxac so as before  4.  Return to see me in 6 months unless she does not improve.      Answers for HPI/ROS submitted by the patient on 10/28/2020   fever: No  eye redness: No  mouth sores: No  headaches: No  shortness of breath: No  chest pain: No  trouble swallowing: No  diarrhea: No  constipation: Yes  unexpected weight change: No  genital sore: No  dysuria: No  During the last 3 days, have you had a skin rash?: No  Bruises or bleeds easily: No  cough: No

## 2020-11-04 ENCOUNTER — PATIENT MESSAGE (OUTPATIENT)
Dept: RHEUMATOLOGY | Facility: CLINIC | Age: 75
End: 2020-11-04

## 2020-11-04 ENCOUNTER — PATIENT MESSAGE (OUTPATIENT)
Dept: ORTHOPEDICS | Facility: CLINIC | Age: 75
End: 2020-11-04

## 2020-11-09 ENCOUNTER — TELEPHONE (OUTPATIENT)
Dept: RHEUMATOLOGY | Facility: CLINIC | Age: 75
End: 2020-11-09

## 2020-11-09 ENCOUNTER — CLINICAL SUPPORT (OUTPATIENT)
Dept: REHABILITATION | Facility: HOSPITAL | Age: 75
End: 2020-11-09
Payer: MEDICARE

## 2020-11-09 DIAGNOSIS — G89.29 CHRONIC PAIN OF LEFT KNEE: ICD-10-CM

## 2020-11-09 DIAGNOSIS — M54.16 LUMBAR RADICULOPATHY: ICD-10-CM

## 2020-11-09 DIAGNOSIS — M25.562 CHRONIC PAIN OF LEFT KNEE: ICD-10-CM

## 2020-11-09 DIAGNOSIS — R52 PAIN: ICD-10-CM

## 2020-11-09 PROCEDURE — 97110 THERAPEUTIC EXERCISES: CPT | Mod: HCNC | Performed by: PHYSICAL THERAPIST

## 2020-11-09 PROCEDURE — 97161 PT EVAL LOW COMPLEX 20 MIN: CPT | Mod: HCNC | Performed by: PHYSICAL THERAPIST

## 2020-11-09 NOTE — PLAN OF CARE
[facility]  Physical Therapy Initial Evaluation    Name: Urmila Martínez  Clinic Number: [id number]    Therapy Diagnosis:   Encounter Diagnoses   Name Primary?    Lumbar radiculopathy     Chronic pain of left knee     Pain      Physician: Lacy Griffiths PA-C    Physician Orders: PT Eval and Treat   Medical Diagnosis: M54.16 (ICD-10-CM) - Lumbar radiculopathy M25.562,G89.29 (ICD-10-CM) - Chronic pain of left knee   Evaluation Date: 11/9/2020  Authorization Period Expiration: 12-04-20  Plan of Care Certification Period: 12-30-20  Visit # / Visits authorized: 1/ 1    Time In: 2:50 pm  Time Out: 3:25 pm  Total Billable Time: 30 minutes    Precautions: Standard    Subjective     Date of onset: 1 mo  History of current condition - Urmila reports: having L knee pain x 4 wks secondary to OA.  Pt states pain has improved greatly with having it drained and receiving a CSI.  Pt denies any LBP or radicular pain.  States pain is mainly with walking or prolonged sitting.       Past Medical History:   Diagnosis Date    Arthritis     Asymptomatic PVCs     Benign liver cyst 09/26/2012    Fibrocystic breast 1995    left    Fibrocystic breast 1997    right    Joint pain     Kidney stones 07/20/2012    NS (nuclear sclerosis) 7/19/2013    Osteopenia     Raynaud's disease     Rheumatoid arthritis(714.0)     Sjogren's disease      Urmila Martínez  has a past surgical history that includes Cyst Removal; Colonoscopy (N/A, 10/20/2015); TONSILLECTOMY, ADENOIDECTOMY; Cholecystectomy (1990's ); Cyst Removal (about 2010); Fracture surgery (Childhood); Breast surgery ([facility] records); Colon surgery; and Breast biopsy (Bilateral).    Urmila has a current medication list which includes the following prescription(s): alendronate, ascorbic acid (vitamin c), aspirin child, atorvastatin, calcium citrate-vitamin d3 315-200 mg, cevimeline, diclofenac, diclofenac sodium, fluad [id number] (65 yr up)(pf),  fluticasone propionate, gabapentin, hydroxychloroquine, and methylprednisolone.    Review of patient's allergies indicates:   Allergen Reactions    Doxycycline      Other reaction(s): vomit  Other reaction(s): Hives    Sulfa (sulfonamide antibiotics) Nausea And Vomiting        Imaging: knee x-ray:  Right: No fracture dislocation bone destruction or OCD seen.     Left: No fracture dislocation bone destruction or OCD seen.  There is chondromalacia patella.      Prior Therapy: na  Social History:  lives with their spouse  Occupation: retired  Prior Level of Function: independent with ADL  Current Level of Function: ADL limited at times by pain    Pain:  Current 0/10, worst 3/10, best 0/10   Location: left knee   Description: Aching  Aggravating Factors: Sitting and Walking  Easing Factors: rest    Pts goals: less pain with walking    Objective        Functional assessment:   - walking:   Independent without a limp             AROM:  0-120 deg flexion     MMT:   4-/5 hip abductors, 4/5 quads and 4+/5 hams    Tone:  Decreased quads; FAIR QS ability; MIN lag with SLR    Flexibility testing:   Mild HS tightness    Special tests:   Neg Lachman and Mala's    Palpation:   No TTP    Joint mobility: good    Swelling:  mild    Other:  Decreased balance in SLS    CMS Impairment/Limitation/Restriction for FOTO LE Survey    Therapist reviewed FOTO scores for Urmila Martínez on 11/9/2020.   FOTO documents entered into [employer] - see Media section.           TREATMENT     Treatment Time In: 2:50 pm  Treatment Time Out: 3:25 pm  Total Treatment time separate from Evaluation time:  15 min    Treatment:  HEP    Home Exercises and Patient Education Provided    Education provided:   - ice for pain    Written Home Exercises Provided: yes.  Exercises were reviewed and Urmila was able to demonstrate them prior to the end of the session.  Urmila demonstrated good  understanding of the education provided.     See EMR under Media for  exercises provided 11/9/2020.      Assessment     Urmila is a 75 y.o. female referred to outpatient Physical Therapy with a medical diagnosis of M54.16 (ICD-10-CM) - Lumbar radiculopathy M25.562,G89.29 (ICD-10-CM) - Chronic pain of left knee   .  Pt presents with L knee pain with swelling and weakness.    Pt prognosis is Fair.   Pt will benefit from skilled outpatient Physical Therapy to address the deficits stated above and in the chart below, provide pt/family education, and to maximize pt's level of independence.     Plan of care discussed with patient: Yes  Pt's spiritual, cultural and educational needs considered and patient is agreeable to the plan of care and goals as stated below:     Anticipated Barriers for therapy: none     Medical Necessity is demonstrated by the following:  History  Co-morbidities and personal factors that may impact the plan of care Co-morbidities:   RA    Personal Factors:   no deficits     low   Examination  Body Structures and Functions, activity limitations and participation restrictions that may impact the plan of care Body Regions:   lower extremities    Body Systems:    gross symmetry  ROM  strength  balance    Participation Restrictions:   none    Activity limitations:   Learning and applying knowledge  no deficits    General Tasks and Commands  no deficits    Communication  no deficits    Mobility  lifting and carrying objects  walking    Self care  no deficits    Domestic Life  doing house work (cleaning house, washing dishes, laundry)    Interactions/Relationships  no deficits    Life Areas  no deficits    Community and Social Life  no deficits         low   Clinical Presentation stable and uncomplicated low   Decision Making/ Complexity Score: low     Goals:  Short Term Goals: 2 weeks         1.   Independent with HEP        2.  Pt will report decreased pain level of < 50% from above measure with ADL    Long Term Goals:   GOALS:    6_   weeks. Pt agrees with goals  set.  1. Independent with HEP.  2. Report decreased    L knee    pain  <   / =  2/10 with ADL such as walking  3. Increased MMT  for  L LE to 4/5 to 5/5  with ADL such as stair climbing  4. Increased arom  for  L knee to 0-125 deg flexion with functional activities such walking or self-care      Plan     Certification Period/Plan of care expiration: 11/9/2020 to 12-30-20.    Outpatient Physical Therapy 1 times weekly for 6 weeks to include the following interventions: Manual Therapy, Moist Heat/ Ice, Patient Education and Therapeutic Exercise.     Ángel Jauregui, PT

## 2020-11-09 NOTE — TELEPHONE ENCOUNTER
Called patient to relay that we will see her on November 30th 10am   Didn't answer left a voicemail

## 2020-11-09 NOTE — PATIENT INSTRUCTIONS
Strengthening: Quadriceps Set    Tighten muscles on top of thighs by pushing knees down into surface. Hold 5 seconds.  Repeat 25 times . R/L or BL  leg per set. Do 1 sets per session. Do 2 sessions per day.      Heel Slides    With towel around left heel, gently pull knee up with towel until stretch is felt. Hold 10 seconds.  Repeat 25 times.. Do 1 set per session. Do 2 sessions per day.      Straight Leg Raise     With R/L or BL leg straight, other leg bent, raise straight leg until knees are even. Slowly lower. Roll on your side and repeat lifting top leg up, on other side, lifting bottom leg up, and on stomach kicking back (squeezing your rear end before you kick back).  Repeat 25 times. Do 1 sets per session. Do 2 sessions per day.       Glute Squeeze, supine    Lie face up and squeeze your rear end. Do not tighten your abdominals or hold your breath. Squeeze our glutes and hold 5 seconds. Relax.  Repeat 25 times per set. Do 1 sets per session. Do 2 sessions per day.      Strengthening: Hip Adduction - Isometric    Sit back or lie down with ball or folded pillow between knees, and squeeze knees together. Hold 5 seconds.  Repeat 25 times per set. Do 1 sets per session. Do 2 sessions per day.        Strengthening: Hip Abductor - Resisted    Lie flat with band looped around both legs above knees, and push thighs apart, ensuring right and left move together.  Repeat 25 times per set. Do 1 sets per session. Do 2 sessions per day.      Clam Shells    Lie on side with knees bent. Raise top leg, keeping knee bent and ankles together. Do not let your hips roll back as your raise your leg.  Repeat 25 times. Do 1 sets per session. Do 2 sessions per day.      Hamstring Curl        Perform 25 times, 2x per day. (HEP to GO)

## 2020-11-15 ENCOUNTER — OFFICE VISIT (OUTPATIENT)
Dept: URGENT CARE | Facility: CLINIC | Age: 75
End: 2020-11-15
Payer: MEDICARE

## 2020-11-15 ENCOUNTER — NURSE TRIAGE (OUTPATIENT)
Dept: ADMINISTRATIVE | Facility: CLINIC | Age: 75
End: 2020-11-15

## 2020-11-15 VITALS
TEMPERATURE: 98 F | HEIGHT: 65 IN | HEART RATE: 83 BPM | WEIGHT: 169 LBS | SYSTOLIC BLOOD PRESSURE: 141 MMHG | DIASTOLIC BLOOD PRESSURE: 79 MMHG | OXYGEN SATURATION: 96 % | RESPIRATION RATE: 18 BRPM | BODY MASS INDEX: 28.16 KG/M2

## 2020-11-15 DIAGNOSIS — J02.9 SORE THROAT: Primary | ICD-10-CM

## 2020-11-15 LAB
CTP QC/QA: YES
CTP QC/QA: YES
MOLECULAR STREP A: NEGATIVE
SARS-COV-2 RDRP RESP QL NAA+PROBE: NEGATIVE

## 2020-11-15 PROCEDURE — 87651 POCT STREP A MOLECULAR: ICD-10-PCS | Mod: QW,S$GLB,, | Performed by: FAMILY MEDICINE

## 2020-11-15 PROCEDURE — 3008F BODY MASS INDEX DOCD: CPT | Mod: CPTII,S$GLB,, | Performed by: FAMILY MEDICINE

## 2020-11-15 PROCEDURE — 99214 PR OFFICE/OUTPT VISIT, EST, LEVL IV, 30-39 MIN: ICD-10-PCS | Mod: S$GLB,,, | Performed by: FAMILY MEDICINE

## 2020-11-15 PROCEDURE — 87651 STREP A DNA AMP PROBE: CPT | Mod: QW,S$GLB,, | Performed by: FAMILY MEDICINE

## 2020-11-15 PROCEDURE — 99214 OFFICE O/P EST MOD 30 MIN: CPT | Mod: S$GLB,,, | Performed by: FAMILY MEDICINE

## 2020-11-15 PROCEDURE — 3008F PR BODY MASS INDEX (BMI) DOCUMENTED: ICD-10-PCS | Mod: CPTII,S$GLB,, | Performed by: FAMILY MEDICINE

## 2020-11-15 PROCEDURE — 1157F ADVNC CARE PLAN IN RCRD: CPT | Mod: S$GLB,,, | Performed by: FAMILY MEDICINE

## 2020-11-15 PROCEDURE — U0002 COVID-19 LAB TEST NON-CDC: HCPCS | Mod: QW,S$GLB,, | Performed by: FAMILY MEDICINE

## 2020-11-15 PROCEDURE — 1157F PR ADVANCE CARE PLAN OR EQUIV PRESENT IN MEDICAL RECORD: ICD-10-PCS | Mod: S$GLB,,, | Performed by: FAMILY MEDICINE

## 2020-11-15 PROCEDURE — U0002: ICD-10-PCS | Mod: QW,S$GLB,, | Performed by: FAMILY MEDICINE

## 2020-11-15 RX ORDER — BENZOCAINE/MENTHOL 6 MG-10 MG
1 LOZENGE MUCOUS MEMBRANE
Qty: 18 TABLET | Refills: 0 | Status: SHIPPED | OUTPATIENT
Start: 2020-11-15 | End: 2020-12-24 | Stop reason: SDUPTHER

## 2020-11-15 NOTE — PATIENT INSTRUCTIONS

## 2020-11-15 NOTE — TELEPHONE ENCOUNTER
Reason for Disposition   COVID-19 Testing, questions about    Protocols used: CORONAVIRUS (COVID-19) DIAGNOSED OR [id number]-T-CR    Patient has upcoming appts this week and was concerned about covid 19 infection because he has a mild sore throat.. She takes hydroxychloroquine for RA and doesn't know how the virus would affect her. No fever. Declined symptom monitoring for covid19 at this time. Referred her to urgent care center in [facility] since she lives close by.
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
## 2020-11-16 ENCOUNTER — HOSPITAL ENCOUNTER (OUTPATIENT)
Dept: RADIOLOGY | Facility: HOSPITAL | Age: 75
Discharge: HOME | End: 2020-11-16
Attending: INTERNAL MEDICINE
Payer: MEDICARE

## 2020-11-16 VITALS — WEIGHT: 170.88 LBS | BODY MASS INDEX: 28.47 KG/M2 | HEIGHT: 65 IN

## 2020-11-16 DIAGNOSIS — Z12.31 ENCOUNTER FOR SCREENING MAMMOGRAM FOR MALIGNANT NEOPLASM OF BREAST: ICD-10-CM

## 2020-11-16 PROCEDURE — 77063 BREAST TOMOSYNTHESIS BI: CPT | Mod: 26,HCNC,, | Performed by: RADIOLOGY

## 2020-11-16 PROCEDURE — 77067 MAMMO DIGITAL SCREENING BILAT WITH TOMOSYNTHESIS_CAD: ICD-10-PCS | Mod: 26,HCNC,, | Performed by: RADIOLOGY

## 2020-11-16 PROCEDURE — 77063 MAMMO DIGITAL SCREENING BILAT WITH TOMOSYNTHESIS_CAD: ICD-10-PCS | Mod: 26,HCNC,, | Performed by: RADIOLOGY

## 2020-11-16 PROCEDURE — 77067 SCR MAMMO BI INCL CAD: CPT | Mod: TC,HCNC

## 2020-11-16 PROCEDURE — 77067 SCR MAMMO BI INCL CAD: CPT | Mod: 26,HCNC,, | Performed by: RADIOLOGY

## 2020-11-16 NOTE — TELEPHONE ENCOUNTER
Agree with evaluation / testing if any symptoms.   Reviewed [facility] note - COVID19 and strep tests were negative, home care advice given.

## 2020-11-17 ENCOUNTER — OFFICE VISIT (OUTPATIENT)
Dept: OPHTHALMOLOGY | Facility: CLINIC | Age: 75
End: 2020-11-17
Payer: MEDICARE

## 2020-11-17 DIAGNOSIS — Z79.899 HIGH RISK MEDICATION USE: ICD-10-CM

## 2020-11-17 DIAGNOSIS — M06.09 RHEUMATOID ARTHRITIS OF MULTIPLE SITES WITH NEGATIVE RHEUMATOID FACTOR: ICD-10-CM

## 2020-11-17 DIAGNOSIS — H35.372 EPIRETINAL MEMBRANE, LEFT EYE: Primary | ICD-10-CM

## 2020-11-17 PROCEDURE — 92134 POSTERIOR SEGMENT OCT RETINA (OCULAR COHERENCE TOMOGRAPHY)-BOTH EYES: ICD-10-PCS | Mod: HCNC,S$GLB,, | Performed by: OPHTHALMOLOGY

## 2020-11-17 PROCEDURE — 1157F PR ADVANCE CARE PLAN OR EQUIV PRESENT IN MEDICAL RECORD: ICD-10-PCS | Mod: HCNC,S$GLB,, | Performed by: OPHTHALMOLOGY

## 2020-11-17 PROCEDURE — 1126F PR PAIN SEVERITY QUANTIFIED, NO PAIN PRESENT: ICD-10-PCS | Mod: HCNC,S$GLB,, | Performed by: OPHTHALMOLOGY

## 2020-11-17 PROCEDURE — 3288F PR FALLS RISK ASSESSMENT DOCUMENTED: ICD-10-PCS | Mod: HCNC,CPTII,S$GLB, | Performed by: OPHTHALMOLOGY

## 2020-11-17 PROCEDURE — 1101F PT FALLS ASSESS-DOCD LE1/YR: CPT | Mod: HCNC,CPTII,S$GLB, | Performed by: OPHTHALMOLOGY

## 2020-11-17 PROCEDURE — 92134 CPTRZ OPH DX IMG PST SGM RTA: CPT | Mod: HCNC,S$GLB,, | Performed by: OPHTHALMOLOGY

## 2020-11-17 PROCEDURE — 92014 PR EYE EXAM, EST PATIENT,COMPREHESV: ICD-10-PCS | Mod: HCNC,S$GLB,, | Performed by: OPHTHALMOLOGY

## 2020-11-17 PROCEDURE — 1126F AMNT PAIN NOTED NONE PRSNT: CPT | Mod: HCNC,S$GLB,, | Performed by: OPHTHALMOLOGY

## 2020-11-17 PROCEDURE — 1157F ADVNC CARE PLAN IN RCRD: CPT | Mod: HCNC,S$GLB,, | Performed by: OPHTHALMOLOGY

## 2020-11-17 PROCEDURE — 92014 COMPRE OPH EXAM EST PT 1/>: CPT | Mod: HCNC,S$GLB,, | Performed by: OPHTHALMOLOGY

## 2020-11-17 PROCEDURE — 92202 OPSCPY EXTND ON/MAC DRAW: CPT | Mod: HCNC,S$GLB,, | Performed by: OPHTHALMOLOGY

## 2020-11-17 PROCEDURE — 99999 PR PBB SHADOW E&M-EST. PATIENT-LVL III: ICD-10-PCS | Mod: PBBFAC,HCNC,, | Performed by: OPHTHALMOLOGY

## 2020-11-17 PROCEDURE — 99999 PR PBB SHADOW E&M-EST. PATIENT-LVL III: CPT | Mod: PBBFAC,HCNC,, | Performed by: OPHTHALMOLOGY

## 2020-11-17 PROCEDURE — 92202 PR OPHTHALMOSCOPY, EXT, W/DRAW OPTIC NERVE/MACULA, I&R, UNI/BI: ICD-10-PCS | Mod: HCNC,S$GLB,, | Performed by: OPHTHALMOLOGY

## 2020-11-17 PROCEDURE — 1101F PR PT FALLS ASSESS DOC 0-1 FALLS W/OUT INJ PAST YR: ICD-10-PCS | Mod: HCNC,CPTII,S$GLB, | Performed by: OPHTHALMOLOGY

## 2020-11-17 PROCEDURE — 3288F FALL RISK ASSESSMENT DOCD: CPT | Mod: HCNC,CPTII,S$GLB, | Performed by: OPHTHALMOLOGY

## 2020-11-17 NOTE — PROGRESS NOTES
HPI     12 mo f/u   DLS- 08/27/2019 Dr. Beach     Pt sts no change noticed since last visit.   Denies pain   (-)Flashes (-)Floaters  (-)Photophobia  (-)Glare    Celluvisc QAM OU        OCT - ERM OS - minimal foveal traction; unchanged from previous exam    A/P    1.  Epiretinal membrane, left eye - not related to plaquenil         2.  Nuclear sclerosis - not visually significant        3.  Sicca syndrome without dry eye symptoms         4.  High-risk medication - Plaquenil use for Sjrogen's syndrome since 2014. - check OCT        1.  Yr. OCT

## 2020-11-19 ENCOUNTER — CLINICAL SUPPORT (OUTPATIENT)
Dept: REHABILITATION | Facility: HOSPITAL | Age: 75
End: 2020-11-19
Payer: MEDICARE

## 2020-11-19 DIAGNOSIS — R52 PAIN: ICD-10-CM

## 2020-11-19 PROCEDURE — 97110 THERAPEUTIC EXERCISES: CPT | Mod: HCNC,CQ

## 2020-11-19 NOTE — PROGRESS NOTES
"  Physical Therapy Daily Treatment Note     Name: Urmila DAN [facility] Number: [id number]    Therapy Diagnosis:   Encounter Diagnosis   Name Primary?    Pain      Physician: Lacy Griffiths PA-C    Visit Date: 11/19/2020    Physician Orders: PT Eval and Treat   Medical Diagnosis: M54.16 (ICD-10-CM) - Lumbar radiculopathy M25.562,G89.29 (ICD-10-CM) - Chronic pain of left knee   Evaluation Date: 11/9/2020  Authorization Period Expiration: 12-04-20  Plan of Care Certification Period: 12-30-20  Visit # / Visits authorized: 1/20    Time In: 1400  Time Out: 1445  Total Billable Time: 40 minutes    Precautions: Standard    Subjective     Pt reports: Pt returns to therapy this pm with no c/o knee pain upon entry. She has been performing her HEP daily which has caused increased pain/soreness at times.     She was compliant with home exercise program.  Response to previous treatment: No adverse effects  Functional change: N/A    Pain: 0/10  Location: left knee      Objective     Urmila received therapeutic exercises to develop strength, endurance, ROM and flexibility for 40 minutes including:    Upright bike (lvl 2.0) x 10' for quad strengthening/cardiovascular endurance  Bridges OTB 20 x 5"   S/L clams OTB 15 x 5"  DL shuttle 25# 2 x 15, shallow ROM  LAQ 2 x 10 x 5"  TKE c small bolster 2 x 10 x 5"  Supine SLR 2 x 8    Urmila received the following manual therapy techniques: Joint mobilizations were applied for 03 minutes including:    Assessment of knee ROM  Patellar mob    Urmila participated in neuromuscular re-education activities to improve Balance, Coordination, Proprioception and Neuromuscular Control for 00 minutes. The following activities were included:      Urmila participated in dynamic functional therapeutic activities to improve functional performance for 00 minutes including:      Urmila participated in gait training to improve functional mobility and safety for 00 minutes including:          Home Exercises " "Provided and Patient Education Provided     Education provided:   - ice for pain    Written Home Exercises Provided: yes.  Exercises were reviewed and Urmila was able to demonstrate them prior to the end of the session.  Urmila demonstrated good  understanding of the education provided.     See EMR under Patient Instructions for exercises provided 11/09/2020.    Assessment     Pt was able to complete all exercises including progressions with no c/o increased knee pain. She reported "pulling" along the L patellar tendon during end range of shuttle squats, but sensation subsided when squat was made more shallow. ROM full bilaterally. Encouraged continued compliance with HEP; pt voiced understanding. No adverse effects reported p tx.     Urmila is progressing well towards her goals.   Pt prognosis is Fair.     Pt will continue to benefit from skilled outpatient physical therapy to address the deficits listed in the problem list box on initial evaluation, provide pt/family education and to maximize pt's level of independence in the home and community environment.     Pt's spiritual, cultural and educational needs considered and pt agreeable to plan of care and goals.     Anticipated barriers to physical therapy: none    Goals:  Short Term Goals: 2 weeks         1.   Independent with HEP        2.  Pt will report decreased pain level of < 50% from above measure with ADL     Long Term Goals:   GOALS:    6_   weeks. Pt agrees with goals set.  1. Independent with HEP.  2. Report decreased    L knee    pain  <   / =  2/10 with ADL such as walking  3. Increased MMT  for  L LE to 4/5 to 5/5  with ADL such as stair climbing  4. Increased arom  for  L knee to 0-125 deg flexion with functional activities such walking or self-care    Plan     Certification Period/Plan of care expiration: 11/9/2020 to 12/30/20.     Outpatient Physical Therapy 1 times weekly for 6 weeks to include the following interventions: Manual Therapy, Moist " Heat/ Ice, Patient Education and Therapeutic Exercise.     Michelle Rubio, PTA

## 2020-11-25 ENCOUNTER — PATIENT MESSAGE (OUTPATIENT)
Dept: RHEUMATOLOGY | Facility: CLINIC | Age: 75
End: 2020-11-25

## 2020-11-25 ENCOUNTER — CLINICAL SUPPORT (OUTPATIENT)
Dept: REHABILITATION | Facility: HOSPITAL | Age: 75
End: 2020-11-25
Payer: MEDICARE

## 2020-11-25 DIAGNOSIS — R52 PAIN: ICD-10-CM

## 2020-11-25 PROCEDURE — 97110 THERAPEUTIC EXERCISES: CPT | Mod: HCNC | Performed by: PHYSICAL THERAPIST

## 2020-11-25 NOTE — PROGRESS NOTES
"  Physical Therapy Daily Treatment Note     Name: Urmila DAN [facility] Number: [id number]    Therapy Diagnosis:   Encounter Diagnosis   Name Primary?    Pain      Physician: Lacy Griffiths PA-C    Visit Date: 11/25/2020    Physician Orders: PT Eval and Treat   Medical Diagnosis: M54.16 (ICD-10-CM) - Lumbar radiculopathy M25.562,G89.29 (ICD-10-CM) - Chronic pain of left knee   Evaluation Date: 11/9/2020  Authorization Period Expiration: 12-04-20  Plan of Care Certification Period: 12-30-20  Visit # / Visits authorized: 3/20    Time In: 11:00 am  Time Out: 11:50 am  Total Billable Time: 40 minutes    Precautions: Standard    Subjective     Pt reports: L knee feels good but still occasional medial pain.    She was compliant with home exercise program.  Response to previous treatment: No adverse effects  Functional change: N/A    Pain: 0/10  Location: left knee      Objective     Urmila received therapeutic exercises to develop strength, endurance, ROM and flexibility for 40 minutes including:  Upright bike (lvl 2.0) x 10' for quad strengthening/cardiovascular endurance  Bridges OTB 20 x 5"   S/L clams OTB 15 x 5"  pilates [place] abd/add  QS  SLR  LAQ  SAQ  Standing OTB TKE  Pat MOBS/PROM x 5 min  HEP review  CP x 10 min    Home Exercises Provided and Patient Education Provided     Education provided:   - ice for pain    Written Home Exercises Provided: yes.  Exercises were reviewed and Urmila was able to demonstrate them prior to the end of the session.  Urmila demonstrated good  understanding of the education provided.     See EMR under Patient Instructions for exercises provided 11/09/2020.    Assessment     No pain with exercises.     Urmila is progressing well towards her goals.   Pt prognosis is Fair.     Pt will continue to benefit from skilled outpatient physical therapy to address the deficits listed in the problem list box on initial evaluation, provide pt/family education and to maximize pt's level of " independence in the home and community environment.     Pt's spiritual, cultural and educational needs considered and pt agreeable to plan of care and goals.     Anticipated barriers to physical therapy: none    Goals:  Short Term Goals: 2 weeks         1.   Independent with HEP        2.  Pt will report decreased pain level of < 50% from above measure with ADL     Long Term Goals:   GOALS:    6_   weeks. Pt agrees with goals set.  1. Independent with HEP.  2. Report decreased    L knee    pain  <   / =  2/10 with ADL such as walking  3. Increased MMT  for  L LE to 4/5 to 5/5  with ADL such as stair climbing  4. Increased arom  for  L knee to 0-125 deg flexion with functional activities such walking or self-care    Plan     Certification Period/Plan of care expiration: 11/9/2020 to 12/30/20.     Outpatient Physical Therapy 1 times weekly for 6 weeks to include the following interventions: Manual Therapy, Moist Heat/ Ice, Patient Education and Therapeutic Exercise.     Ángel Jauregui, PT

## 2020-11-30 ENCOUNTER — PATIENT OUTREACH (OUTPATIENT)
Dept: ADMINISTRATIVE | Facility: OTHER | Age: 75
End: 2020-11-30

## 2020-11-30 NOTE — PROGRESS NOTES
Health Maintenance Due   Topic Date Due    Colorectal Cancer Screening  10/20/2020     Updates were requested from care everywhere.  Chart was reviewed for overdue Proactive [facility] Encounters (GONZALO) topics (CRS, Breast Cancer Screening, Eye exam)  Health Maintenance has been updated.  [employer] immunization registry triggered.  Immunizations were reconciled.

## 2020-12-01 ENCOUNTER — CLINICAL SUPPORT (OUTPATIENT)
Dept: REHABILITATION | Facility: HOSPITAL | Age: 75
End: 2020-12-01
Payer: MEDICARE

## 2020-12-01 DIAGNOSIS — R52 PAIN: ICD-10-CM

## 2020-12-01 PROCEDURE — 97110 THERAPEUTIC EXERCISES: CPT | Mod: HCNC | Performed by: PHYSICAL THERAPIST

## 2020-12-01 NOTE — PROGRESS NOTES
"  Physical Therapy Daily Treatment Note     Name: Urmila DAN [facility] Number: [id number]    Therapy Diagnosis:   Encounter Diagnosis   Name Primary?    Pain      Physician: Lacy Griffiths PA-C    Visit Date: 12/1/2020    Physician Orders: PT Eval and Treat   Medical Diagnosis: M54.16 (ICD-10-CM) - Lumbar radiculopathy M25.562,G89.29 (ICD-10-CM) - Chronic pain of left knee   Evaluation Date: 11/9/2020  Authorization Period Expiration: 12-04-20  Plan of Care Certification Period: 12-30-20  Visit # / Visits authorized: 4/20    Time In: 3:10 pm  Time Out: 4:00 pm  Total Billable Time: 40 minutes    Precautions: Standard    Subjective     Pt reports: L knee feels a little stiff but better overall.    She was compliant with home exercise program.  Response to previous treatment: No adverse effects  Functional change: N/A    Pain: 0/10  Location: left knee      Objective     QS ability increased.  Mild TTP medial joint line.  Mild swelling.    Urmila received therapeutic exercises to develop strength, endurance, ROM and flexibility for 40 minutes including:  Upright bike (lvl 2.0) x 10' for quad strengthening/cardiovascular endurance  Bridges OTB 20 x 5"   S/L clams OTB 15 x 5"  pilates [place] abd/add  QS  SLR  LAQ  SAQ  Standing OTB TKE  Pat MOBS/PROM x 5 min  HEP review  CP x 10 min    Home Exercises Provided and Patient Education Provided     Education provided:   - ice for pain    Written Home Exercises Provided: yes.  Exercises were reviewed and Urmila was able to demonstrate them prior to the end of the session.  Urmila demonstrated good  understanding of the education provided.     See EMR under Patient Instructions for exercises provided 11/09/2020.    Assessment     No pain with exercises.     Urmila is progressing well towards her goals.   Pt prognosis is Fair.     Pt will continue to benefit from skilled outpatient physical therapy to address the deficits listed in the problem list box on initial " evaluation, provide pt/family education and to maximize pt's level of independence in the home and community environment.     Pt's spiritual, cultural and educational needs considered and pt agreeable to plan of care and goals.     Anticipated barriers to physical therapy: none    Goals:  Short Term Goals: 2 weeks         1.   Independent with HEP        2.  Pt will report decreased pain level of < 50% from above measure with ADL     Long Term Goals:   GOALS:    6_   weeks. Pt agrees with goals set.  1. Independent with HEP.  2. Report decreased    L knee    pain  <   / =  2/10 with ADL such as walking  3. Increased MMT  for  L LE to 4/5 to 5/5  with ADL such as stair climbing  4. Increased arom  for  L knee to 0-125 deg flexion with functional activities such walking or self-care    Plan     Certification Period/Plan of care expiration: 11/9/2020 to 12/30/20.     Outpatient Physical Therapy 1 times weekly for 6 weeks to include the following interventions: Manual Therapy, Moist Heat/ Ice, Patient Education and Therapeutic Exercise.     Ángel Jauergui, PT

## 2020-12-02 ENCOUNTER — OFFICE VISIT (OUTPATIENT)
Dept: ORTHOPEDICS | Facility: CLINIC | Age: 75
End: 2020-12-02
Attending: ORTHOPAEDIC SURGERY
Payer: MEDICARE

## 2020-12-02 VITALS
SYSTOLIC BLOOD PRESSURE: 147 MMHG | WEIGHT: 170 LBS | BODY MASS INDEX: 28.32 KG/M2 | HEIGHT: 65 IN | DIASTOLIC BLOOD PRESSURE: 79 MMHG

## 2020-12-02 DIAGNOSIS — M67.40 GANGLION CYST: ICD-10-CM

## 2020-12-02 DIAGNOSIS — G56.02 CARPAL TUNNEL SYNDROME OF LEFT WRIST: Primary | ICD-10-CM

## 2020-12-02 PROCEDURE — 3078F PR MOST RECENT DIASTOLIC BLOOD PRESSURE < 80 MM HG: ICD-10-PCS | Mod: HCNC,CPTII,S$GLB, | Performed by: ORTHOPAEDIC SURGERY

## 2020-12-02 PROCEDURE — 99999 PR PBB SHADOW E&M-EST. PATIENT-LVL III: CPT | Mod: PBBFAC,HCNC,, | Performed by: ORTHOPAEDIC SURGERY

## 2020-12-02 PROCEDURE — 99999 PR PBB SHADOW E&M-EST. PATIENT-LVL III: ICD-10-PCS | Mod: PBBFAC,HCNC,, | Performed by: ORTHOPAEDIC SURGERY

## 2020-12-02 PROCEDURE — 99213 OFFICE O/P EST LOW 20 MIN: CPT | Mod: HCNC,25,S$GLB, | Performed by: ORTHOPAEDIC SURGERY

## 2020-12-02 PROCEDURE — 20612 ASPIRATE/INJ GANGLION CYST: CPT | Mod: HCNC,RT,S$GLB, | Performed by: ORTHOPAEDIC SURGERY

## 2020-12-02 PROCEDURE — 1101F PR PT FALLS ASSESS DOC 0-1 FALLS W/OUT INJ PAST YR: ICD-10-PCS | Mod: HCNC,CPTII,S$GLB, | Performed by: ORTHOPAEDIC SURGERY

## 2020-12-02 PROCEDURE — 1126F PR PAIN SEVERITY QUANTIFIED, NO PAIN PRESENT: ICD-10-PCS | Mod: HCNC,S$GLB,, | Performed by: ORTHOPAEDIC SURGERY

## 2020-12-02 PROCEDURE — 20612 PR ASPIRAT/INJECTION GANGLION CYST(S): ICD-10-PCS | Mod: HCNC,RT,S$GLB, | Performed by: ORTHOPAEDIC SURGERY

## 2020-12-02 PROCEDURE — 1157F PR ADVANCE CARE PLAN OR EQUIV PRESENT IN MEDICAL RECORD: ICD-10-PCS | Mod: HCNC,S$GLB,, | Performed by: ORTHOPAEDIC SURGERY

## 2020-12-02 PROCEDURE — 1101F PT FALLS ASSESS-DOCD LE1/YR: CPT | Mod: HCNC,CPTII,S$GLB, | Performed by: ORTHOPAEDIC SURGERY

## 2020-12-02 PROCEDURE — 3288F PR FALLS RISK ASSESSMENT DOCUMENTED: ICD-10-PCS | Mod: HCNC,CPTII,S$GLB, | Performed by: ORTHOPAEDIC SURGERY

## 2020-12-02 PROCEDURE — 1157F ADVNC CARE PLAN IN RCRD: CPT | Mod: HCNC,S$GLB,, | Performed by: ORTHOPAEDIC SURGERY

## 2020-12-02 PROCEDURE — 3288F FALL RISK ASSESSMENT DOCD: CPT | Mod: HCNC,CPTII,S$GLB, | Performed by: ORTHOPAEDIC SURGERY

## 2020-12-02 PROCEDURE — 3078F DIAST BP <80 MM HG: CPT | Mod: HCNC,CPTII,S$GLB, | Performed by: ORTHOPAEDIC SURGERY

## 2020-12-02 PROCEDURE — 3008F PR BODY MASS INDEX (BMI) DOCUMENTED: ICD-10-PCS | Mod: HCNC,CPTII,S$GLB, | Performed by: ORTHOPAEDIC SURGERY

## 2020-12-02 PROCEDURE — 3008F BODY MASS INDEX DOCD: CPT | Mod: HCNC,CPTII,S$GLB, | Performed by: ORTHOPAEDIC SURGERY

## 2020-12-02 PROCEDURE — 99213 PR OFFICE/OUTPT VISIT, EST, LEVL III, 20-29 MIN: ICD-10-PCS | Mod: HCNC,25,S$GLB, | Performed by: ORTHOPAEDIC SURGERY

## 2020-12-02 PROCEDURE — 3077F SYST BP >= 140 MM HG: CPT | Mod: HCNC,CPTII,S$GLB, | Performed by: ORTHOPAEDIC SURGERY

## 2020-12-02 PROCEDURE — 3077F PR MOST RECENT SYSTOLIC BLOOD PRESSURE >= 140 MM HG: ICD-10-PCS | Mod: HCNC,CPTII,S$GLB, | Performed by: ORTHOPAEDIC SURGERY

## 2020-12-02 PROCEDURE — 1159F MED LIST DOCD IN RCRD: CPT | Mod: HCNC,S$GLB,, | Performed by: ORTHOPAEDIC SURGERY

## 2020-12-02 PROCEDURE — 1126F AMNT PAIN NOTED NONE PRSNT: CPT | Mod: HCNC,S$GLB,, | Performed by: ORTHOPAEDIC SURGERY

## 2020-12-02 PROCEDURE — 1159F PR MEDICATION LIST DOCUMENTED IN MEDICAL RECORD: ICD-10-PCS | Mod: HCNC,S$GLB,, | Performed by: ORTHOPAEDIC SURGERY

## 2020-12-02 RX ORDER — TRIAMCINOLONE ACETONIDE 40 MG/ML
20 INJECTION, SUSPENSION INTRA-ARTICULAR; INTRAMUSCULAR
Status: COMPLETED | OUTPATIENT
Start: 2020-12-02 | End: 2020-12-02

## 2020-12-02 RX ADMIN — TRIAMCINOLONE ACETONIDE 20 MG: 40 INJECTION, SUSPENSION INTRA-ARTICULAR; INTRAMUSCULAR at 03:12
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
## 2020-12-07 ENCOUNTER — CLINICAL SUPPORT (OUTPATIENT)
Dept: REHABILITATION | Facility: HOSPITAL | Age: 75
End: 2020-12-07
Payer: MEDICARE

## 2020-12-07 DIAGNOSIS — R52 PAIN: ICD-10-CM

## 2020-12-07 PROCEDURE — 97110 THERAPEUTIC EXERCISES: CPT | Mod: HCNC | Performed by: PHYSICAL THERAPIST

## 2020-12-07 NOTE — PROGRESS NOTES
"  Physical Therapy Daily Treatment Note     Name: Urmila DAN [facility] Number: [id number]    Therapy Diagnosis:   Encounter Diagnosis   Name Primary?    Pain      Physician: Lacy Griffiths PA-C    Visit Date: 12/7/2020    Physician Orders: PT Eval and Treat   Medical Diagnosis: M54.16 (ICD-10-CM) - Lumbar radiculopathy M25.562,G89.29 (ICD-10-CM) - Chronic pain of left knee   Evaluation Date: 11/9/2020  Authorization Period Expiration: 12-04-20  Plan of Care Certification Period: 12-30-20  Visit # / Visits authorized: 5/20    Time In: 3:05 pm  Time Out: 4:00 pm  Total Billable Time: 40 minutes    Precautions: Standard    Subjective     Pt reports: L knee feels a little better overall.  Still having anterior pain at times.    She was compliant with home exercise program.  Response to previous treatment: No adverse effects  Functional change: N/A    Pain: 0/10  Location: left knee      Objective     QS ability increased.  Mild TTP medial joint line.  Mild swelling.    Urmila received therapeutic exercises to develop strength, endurance, ROM and flexibility for 40 minutes including:  Upright bike (lvl 2.0) x 10' for quad strengthening/cardiovascular endurance  Bridges OTB 20 x 5"   S/L clams OTB 15 x 5"  pilates [place] abd/add  QS  SLR  LAQ  SAQ  Standing OTB TKE  Pat MOBS/PROM x 5 min  HEP review  CP x 10 min    Home Exercises Provided and Patient Education Provided     Education provided:   - ice for pain    Written Home Exercises Provided: yes.  Exercises were reviewed and Urmila was able to demonstrate them prior to the end of the session.  Urmila demonstrated good  understanding of the education provided.     See EMR under Patient Instructions for exercises provided 11/09/2020.    Assessment     No pain with exercises.  Patella mobility improved.    Urmila is progressing well towards her goals.   Pt prognosis is Fair.     Pt will continue to benefit from skilled outpatient physical therapy to address the " deficits listed in the problem list box on initial evaluation, provide pt/family education and to maximize pt's level of independence in the home and community environment.     Pt's spiritual, cultural and educational needs considered and pt agreeable to plan of care and goals.     Anticipated barriers to physical therapy: none    Goals:  Short Term Goals: 2 weeks         1.   Independent with HEP        2.  Pt will report decreased pain level of < 50% from above measure with ADL     Long Term Goals:   GOALS:    6_   weeks. Pt agrees with goals set.  1. Independent with HEP.  2. Report decreased    L knee    pain  <   / =  2/10 with ADL such as walking  3. Increased MMT  for  L LE to 4/5 to 5/5  with ADL such as stair climbing  4. Increased arom  for  L knee to 0-125 deg flexion with functional activities such walking or self-care    Plan     Certification Period/Plan of care expiration: 11/9/2020 to 12/30/20.     Outpatient Physical Therapy 1 times weekly for 6 weeks to include the following interventions: Manual Therapy, Moist Heat/ Ice, Patient Education and Therapeutic Exercise.     Ángel Jauregui, PT

## 2020-12-24 ENCOUNTER — PATIENT MESSAGE (OUTPATIENT)
Dept: RHEUMATOLOGY | Facility: CLINIC | Age: 75
End: 2020-12-24

## 2020-12-24 ENCOUNTER — OFFICE VISIT (OUTPATIENT)
Dept: RHEUMATOLOGY | Facility: CLINIC | Age: 75
End: 2020-12-24
Payer: MEDICARE

## 2020-12-24 VITALS
DIASTOLIC BLOOD PRESSURE: 62 MMHG | SYSTOLIC BLOOD PRESSURE: 130 MMHG | HEART RATE: 64 BPM | HEIGHT: 65 IN | WEIGHT: 175.5 LBS | BODY MASS INDEX: 29.24 KG/M2

## 2020-12-24 DIAGNOSIS — M54.12 CERVICAL RADICULOPATHY: ICD-10-CM

## 2020-12-24 DIAGNOSIS — M05.79 RHEUMATOID ARTHRITIS INVOLVING MULTIPLE SITES WITH POSITIVE RHEUMATOID FACTOR: Primary | ICD-10-CM

## 2020-12-24 DIAGNOSIS — M15.9 PRIMARY OSTEOARTHRITIS INVOLVING MULTIPLE JOINTS: ICD-10-CM

## 2020-12-24 DIAGNOSIS — M35.00 SJOGREN'S SYNDROME, WITH UNSPECIFIED ORGAN INVOLVEMENT: ICD-10-CM

## 2020-12-24 DIAGNOSIS — R76.12 POSITIVE QUANTIFERON-TB GOLD TEST: ICD-10-CM

## 2020-12-24 DIAGNOSIS — M05.79 RHEUMATOID ARTHRITIS INVOLVING MULTIPLE SITES WITH POSITIVE RHEUMATOID FACTOR: ICD-10-CM

## 2020-12-24 DIAGNOSIS — N18.31 STAGE 3A CHRONIC KIDNEY DISEASE: ICD-10-CM

## 2020-12-24 DIAGNOSIS — Z55.9 EDUCATIONAL CIRCUMSTANCE: ICD-10-CM

## 2020-12-24 DIAGNOSIS — Z79.899 LONG-TERM USE OF HYDROXYCHLOROQUINE: ICD-10-CM

## 2020-12-24 DIAGNOSIS — M25.50 PAIN IN JOINT INVOLVING MULTIPLE SITES: Primary | ICD-10-CM

## 2020-12-24 PROCEDURE — 1159F PR MEDICATION LIST DOCUMENTED IN MEDICAL RECORD: ICD-10-PCS | Mod: HCNC,S$GLB,, | Performed by: INTERNAL MEDICINE

## 2020-12-24 PROCEDURE — 3008F BODY MASS INDEX DOCD: CPT | Mod: HCNC,CPTII,S$GLB, | Performed by: INTERNAL MEDICINE

## 2020-12-24 PROCEDURE — 99999 PR PBB SHADOW E&M-EST. PATIENT-LVL V: ICD-10-PCS | Mod: PBBFAC,HCNC,, | Performed by: INTERNAL MEDICINE

## 2020-12-24 PROCEDURE — 1159F MED LIST DOCD IN RCRD: CPT | Mod: HCNC,S$GLB,, | Performed by: INTERNAL MEDICINE

## 2020-12-24 PROCEDURE — 99215 PR OFFICE/OUTPT VISIT, EST, LEVL V, 40-54 MIN: ICD-10-PCS | Mod: HCNC,S$GLB,, | Performed by: INTERNAL MEDICINE

## 2020-12-24 PROCEDURE — 3008F PR BODY MASS INDEX (BMI) DOCUMENTED: ICD-10-PCS | Mod: HCNC,CPTII,S$GLB, | Performed by: INTERNAL MEDICINE

## 2020-12-24 PROCEDURE — 3078F DIAST BP <80 MM HG: CPT | Mod: HCNC,CPTII,S$GLB, | Performed by: INTERNAL MEDICINE

## 2020-12-24 PROCEDURE — 99499 UNLISTED E&M SERVICE: CPT | Mod: S$GLB,,, | Performed by: INTERNAL MEDICINE

## 2020-12-24 PROCEDURE — 3075F PR MOST RECENT SYSTOLIC BLOOD PRESS GE 130-139MM HG: ICD-10-PCS | Mod: HCNC,CPTII,S$GLB, | Performed by: INTERNAL MEDICINE

## 2020-12-24 PROCEDURE — 1125F PR PAIN SEVERITY QUANTIFIED, PAIN PRESENT: ICD-10-PCS | Mod: HCNC,S$GLB,, | Performed by: INTERNAL MEDICINE

## 2020-12-24 PROCEDURE — 99215 OFFICE O/P EST HI 40 MIN: CPT | Mod: HCNC,S$GLB,, | Performed by: INTERNAL MEDICINE

## 2020-12-24 PROCEDURE — 1157F ADVNC CARE PLAN IN RCRD: CPT | Mod: HCNC,S$GLB,, | Performed by: INTERNAL MEDICINE

## 2020-12-24 PROCEDURE — 1125F AMNT PAIN NOTED PAIN PRSNT: CPT | Mod: HCNC,S$GLB,, | Performed by: INTERNAL MEDICINE

## 2020-12-24 PROCEDURE — 1157F PR ADVANCE CARE PLAN OR EQUIV PRESENT IN MEDICAL RECORD: ICD-10-PCS | Mod: HCNC,S$GLB,, | Performed by: INTERNAL MEDICINE

## 2020-12-24 PROCEDURE — 99999 PR PBB SHADOW E&M-EST. PATIENT-LVL V: CPT | Mod: PBBFAC,HCNC,, | Performed by: INTERNAL MEDICINE

## 2020-12-24 PROCEDURE — 3075F SYST BP GE 130 - 139MM HG: CPT | Mod: HCNC,CPTII,S$GLB, | Performed by: INTERNAL MEDICINE

## 2020-12-24 PROCEDURE — 3078F PR MOST RECENT DIASTOLIC BLOOD PRESSURE < 80 MM HG: ICD-10-PCS | Mod: HCNC,CPTII,S$GLB, | Performed by: INTERNAL MEDICINE

## 2020-12-24 PROCEDURE — 99499 RISK ADDL DX/OHS AUDIT: ICD-10-PCS | Mod: S$GLB,,, | Performed by: INTERNAL MEDICINE

## 2020-12-24 RX ORDER — METHOTREXATE 2.5 MG/1
15 TABLET ORAL WEEKLY
Qty: 30 TABLET | Refills: 3
Start: 2020-12-24 | End: 2021-02-01

## 2020-12-24 RX ORDER — FOLIC ACID 1 MG/1
1 TABLET ORAL DAILY
Qty: 100 TABLET | Refills: 3
Start: 2020-12-24 | End: 2020-12-31 | Stop reason: SDUPTHER

## 2020-12-24 RX ORDER — DICLOFENAC SODIUM 75 MG/1
75 TABLET, DELAYED RELEASE ORAL 2 TIMES DAILY
COMMUNITY

## 2020-12-24 SDOH — SOCIAL DETERMINANTS OF HEALTH (SDOH): PROBLEMS RELATED TO EDUCATION AND LITERACY, UNSPECIFIED: Z55.9

## 2020-12-24 ASSESSMENT — ROUTINE ASSESSMENT OF PATIENT INDEX DATA (RAPID3)
PSYCHOLOGICAL DISTRESS SCORE: 0
MDHAQ FUNCTION SCORE: 0.8
FATIGUE SCORE: 0
TOTAL RAPID3 SCORE: 4.22
PAIN SCORE: 10
PATIENT GLOBAL ASSESSMENT SCORE: 0
AM STIFFNESS SCORE: 1, YES
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
## 2020-12-24 NOTE — PROGRESS NOTES
Rapid3 Question Responses and Scores 12/21/2020   MDHAQ Score 0.8   Psychologic Score 0   Pain Score 10   When you awakened in the morning OVER THE LAST WEEK, did you feel stiff? Yes   If Yes, please indicate the number of hours until you are as limber as you will be for the day -   Fatigue Score 0   Global Health Score 6   RAPID3 Score 6.22

## 2020-12-24 NOTE — PATIENT INSTRUCTIONS
Stop all NSAIDs as your kidney functioned has dropped.    Read on rheumatoid arthritis and methotrexate.        Rheumatoid Arthritis  You have rheumatoid arthritis (RA). This is a chronic disease that mainly affects the joints. Sometimes, it also affects other parts of the body. RA is an autoimmune disease. This means that the bodys immune system, which normally protects the body, causes harm instead. With RA, the immune system attacks the joints. The reason for this is unknown.  In most cases, RA affects pairs of joints on both sides of the body. These can include joints in both elbows, wrists, hands, knees, feet, or ankles. The disease often starts slowly. Early symptoms include stiffness, muscle aches, weakness, and fatigue. Over time, the joints may begin to hurt. They may also become warm, swollen, or tender. Symptoms may feel worse in the morning after a nights rest and may get better with activity.  With RA, you may have periods of active disease (when symptoms worsen) followed by periods of remission (when symptoms improve or go away). There is no known cure for RA. But medical treatment can slow or stop the progress of the disease. It can also help relieve symptoms. For advanced disease, surgery, such as joint replacement, may be the best option.  Home care  · If you were prescribed a medicine, take it as directed.  · To help control swelling and pain, acetaminophen, ibuprofen, or another NSAID (non-steroidal anti-inflammatory drug) may be recommended. Note: If you have chronic liver or kidney disease or ever had a stomach ulcer or gastrointestinal bleeding, tell your healthcare provider before taking any of these medicines.  · Some persons find relief with heat (hot shower, hot bath, or heating pad). Others prefer cold (ice in a plastic bag, wrapped in a towel). Try both. Then use the method you like best. Use heat or cold for about 20 minutes, a few times a day.  · Exercise is a key part of treatment  for RA. It helps reduce pain. It may also improve flexibility. Do your best to be active daily. Move your joints through their full range of motion each morning. Avoid staying in any one position for long periods of time. Take breaks throughout the day and move around. Also, ask your healthcare provider or physical therapist what exercises are best for you.  · If you are overweight, lose weight. Extra weight puts stress on your joints.  · If you smoke, quit. Smoking raises the risk of other problems linked to RA.  · No herbal product or nutritional supplement has been proven to help RA. But treatments such as acupuncture and massage may help relieve pain.  · Talk to your healthcare provider or occupational therapist about easier ways to perform daily tasks. This may include the use of assistive devices. These are special tools that can help with things like dressing, bathing, cooking, driving, and moving or getting around.  Follow-up care  Follow up with your healthcare provider, or as advised.   When to seek medical advice  Call your healthcare provider right away if any of these occur:  · Increasing weakness, pale color of the skin, fainting  · Chest pain or shortness of breath  · Blood in vomit or stool (black or red color)  · Changes in vision  · Skin ulcers  · Fever of 100.4ºF (38ºC) or higher, or as directed by your healthcare provider  · New joint pain  · New rash  Resources  To learn more about RA, contact:  · Arthritis Foundation, 818.977.6312, www.arthritis.org  · [employer] of Arth[employer] and Musculoskeletal and Skin Diseases (NIAMS), 593.613.3803, www.niams.nih.gov     Date Last Reviewed: 3/1/2017  © 6426-9974 [employer]. [address] [place] [address], [place] [address]. All rights reserved. This information is not intended as a substitute for professional medical care. Always follow your healthcare professional's instructions.        Living with Rheumatoid Arthritis    Rheumatoid arthritis  (RA) is a chronic disease, but it doesn't have to keep you from being active. It is an autoimmune disease and your immune system attacks the lining of your joints. You can help control RA with exercise and a healthy lifestyle. Be sure to see your healthcare provider for scheduled checkups and lab work. At some point, you may be referred to a rheumatologist (a healthcare provider who specializes in arthritis and related diseases).  Make exercise part of your life  Gentle exercise can help lessen your pain. Keep the following in mind:  · Choose exercises that improve joint motion and make your muscles stronger. Your healthcare provider or a physical therapist may suggest a few.  · Most people should exercise for at least 30 minutes a day on most days of the week. This can be broken up into shorter periods throughout the day.  · Try walking, riding a bike, or doing exercises in a warm pool. Look for programs in your community for people with arthritis.   · Dont push yourself too hard at first. Slowly build up over time.  · Make sure you warm up for 5 to 10 minutes each time you exercise. Stretching and flexibility exercises are often helpful.   · If pain and stiffness increase, don't exercise as hard or as long.  Watch your weight  If you weigh more than you should, your weight-bearing joints are under extra pressure. This makes your symptoms worse. To reduce pain and stiffness, try shedding a few of those extra pounds. The tips below may help:  · Start a weight-loss program with the help of your healthcare provider.  · Ask your friends and family for support.  · Join a weight-loss group.  Learn ways to cope  Most people with long-term conditions find it a challenge to deal with the emotions that often go along with their conditions. With rheumatoid arthritis, there is also pain.   · Work with your healthcare provider on ways to lessen pain. Medicines, use of heat and cold, and other methods are available.  · Learn to  relax. Although it may not be easy, it does help lessen stress, anxiety, and pain. Simple deep-breathing exercises, meditation, and yoga are examples of relaxation techniques.  · Depression is common with long-term conditions. If you feel depressed, make sure you talk with your healthcare provider. Again, treatments, like medicine and counseling, are available.  Try to make your day easier  There are things you can do every day to protect your joints:  · Learn to balance rest with activity. Even on days when you have few symptoms, rest is still important.  · Ask friends and family members for help. Help with simple things can make a big difference for you. For example, you might ask someone to change a light bulb, or take out your weekly garbage.  · Use assistive devices, which are special tools that reduce strain and protect joints. For example:  ¨ Long-handled reachers or grabbers for reaching high and low  ¨ Jar-openers, two-handled cups, and button --all of these devices help to protect your fingers, hands, and wrists  ¨ Large  for pencils, pens, kitchen and garden tools  The Arthritis Foundation has many additional suggestions about protecting your joints. Go to their website at http://www.arthritis.org.  Use mobility and other aids  People with arthritis and other problems affecting the joints often use mobility aids, to help with walking. For example, they may use canes or walkers. They may also use splints or braces to support joints. Talk with your healthcare provider or therapist about these aids. For instance, you might benefit from:  · Use a cane to ease knee or hip pain and help prevent falls  · Splints for your wrists or other joints  · A brace to support a weak knee joint  Date Last Reviewed: 2/14/2016  © 9062-2708 [employer]. [address] [place] [address], [place] [address]. All rights reserved. This information is not intended as a substitute for professional medical care.  Always follow your healthcare professional's instructions.        What is Rheumatoid Arthritis?  Rheumatoid arthritis (RA) is a disease that affects the synovium, the lining of the joints. This causes pain, swelling, and stiffness. Left untreated, rheumatoid arthritis may damage joints so badly that they no longer function. This disease appears most often in young-adult to middle-age women.      Rheumatoid arthritis affects the lining (synovium) of the joints, sometimes causing swelling.   What causes RA?  RA is an autoimmune disorder. This means the immune system, which normally protects the body, actually causes harm. In RA, the immune system attacks the joint lining. The reason for this is unknown. Researchers believe it is a combination of genes (what is inherited from parents) and environment (for example, having infections from viruses or bacteria). Also, people who smoke or are overweight have an increased risk of developing RA.  What are the symptoms of RA?  Rheumatoid arthritis can affect most joints. The hands, wrists, elbows, knees, and balls of the feet are common sites. This disease often affects the same joint on both sides of the body. Symptoms may include:  · Tender, swollen inflamed joints. They may also look red and feel warm.  · Stiff joints. Long periods of rest or using a joint too long or too hard can make stiffness worse.  · Joints that have lost normal shape and motion.  · Feeling tired.  How is RA diagnosed?  To diagnose rheumatoid arthritis, your healthcare provider will ask you a lot of questions about your medical history and current symptoms. He or she will examine you, paying close attention to your joints. You will also have blood tests and X-rays. Your provider will likely recommend that you see a rheumatologist, an arthritis specialist.  Date Last Reviewed: 2/14/2016  © 3847-2366 [employer]. [address] [place] [address], [place] [address]. All rights reserved. This  information is not intended as a substitute for professional medical care. Always follow your healthcare professional's instructions.        Methotrexate tablets  What is this medicine?  METHOTREXATE (METH oh TREX ate) is a chemotherapy drug used to treat cancer including breast cancer, leukemia, and lymphoma. This medicine can also be used to treat psoriasis and certain kinds of arthritis.  How should I use this medicine?  Take this medicine by mouth with a glass of water. Follow the directions on the prescription label. Take your medicine at regular intervals. Do not take it more often than directed. Do not stop taking except on your doctor's advice.  Make sure you know why you are taking this medicine and how often you should take it. If this medicine is used for a condition that is not cancer, like arthritis or psoriasis, it should be taken weekly, NOT daily. Taking this medicine more often than directed can cause serious side effects, even death.  Talk to your healthcare provider about safe handling and disposal of this medicine. You may need to take special precautions.  Talk to your pediatrician regarding the use of this medicine in children. While this drug may be prescribed for selected conditions, precautions do apply.  What side effects may I notice from receiving this medicine?  Side effects that you should report to your doctor or health care professional as soon as possible:  · allergic reactions like skin rash, itching or hives, swelling of the face, lips, or tongue  · breathing problems or shortness of breath  · diarrhea  · dry, nonproductive cough  · low blood counts - this medicine may decrease the number of white blood cells, red blood cells and platelets. You may be at increased risk for infections and bleeding.  · mouth sores  · redness, blistering, peeling or loosening of the skin, including inside the mouth  · signs of infection - fever or chills, cough, sore throat, pain or trouble passing  urine  · signs and symptoms of bleeding such as bloody or black, tarry stools; red or dark-brown urine; spitting up blood or brown material that looks like coffee grounds; red spots on the skin; unusual bruising or bleeding from the eye, gums, or nose  · signs and symptoms of kidney injury like trouble passing urine or change in the amount of urine  · signs and symptoms of liver injury like dark yellow or brown urine; general ill feeling or flu-like symptoms; light-colored stools; loss of appetite; nausea; right upper belly pain; unusually weak or tired; yellowing of the eyes or skin  Side effects that usually do not require medical attention (report to your doctor or health care professional if they continue or are bothersome):  · dizziness  · hair loss  · tiredness  · upset stomach  · vomiting  What may interact with this medicine?  This medicine may interact with the following medication:  · acitretin  · aspirin and aspirin-like medicines including salicylates  · azathioprine  · certain antibiotics like penicillins, tetracycline, and chloramphenicol  · cyclosporine  · gold  · hydroxychloroquine  · live virus vaccines  · NSAIDs, medicines for pain and inflammation, like ibuprofen or naproxen  · other cytotoxic agents  · penicillamine  · phenylbutazone  · phenytoin  · probenecid  · retinoids such as isotretinoin and tretinoin  · steroid medicines like prednisone or cortisone  · sulfonamides like sulfasalazine and trimethoprim/sulfamethoxazole  · theophylline  What if I miss a dose?  If you miss a dose, talk with your doctor or health care professional. Do not take double or extra doses.  Where should I keep my medicine?  Keep out of the reach of children.  Store at room temperature between 20 and 25 degrees C (68 and 77 degrees F). Protect from light. Throw away any unused medicine after the expiration date.  What should I tell my health care provider before I take this medicine?  They need to know if you have any  of these conditions:  · fluid in the stomach area or lungs  · if you often drink alcohol  · infection or immune system problems  · kidney disease or on hemodialysis  · liver disease  · low blood counts, like low white cell, platelet, or red cell counts  · lung disease  · radiation therapy  · stomach ulcers  · ulcerative colitis  · an unusual or allergic reaction to methotrexate, other medicines, foods, dyes, or preservatives  · pregnant or trying to get pregnant  · breast-feeding  What should I watch for while using this medicine?  Avoid alcoholic drinks.  This medicine can make you more sensitive to the sun. Keep out of the sun. If you cannot avoid being in the sun, wear protective clothing and use sunscreen. Do not use sun lamps or tanning beds/booths.  You may need blood work done while you are taking this medicine.  Call your doctor or health care professional for advice if you get a fever, chills or sore throat, or other symptoms of a cold or flu. Do not treat yourself. This drug decreases your body's ability to fight infections. Try to avoid being around people who are sick.  This medicine may increase your risk to bruise or bleed. Call your doctor or health care professional if you notice any unusual bleeding.  Check with your doctor or health care professional if you get an attack of severe diarrhea, nausea and vomiting, or if you sweat a lot. The loss of too much body fluid can make it dangerous for you to take this medicine.  Talk to your doctor about your risk of cancer. You may be more at risk for certain types of cancers if you take this medicine.  Both men and women must use effective birth control with this medicine. Do not become pregnant while taking this medicine or until at least 1 normal menstrual cycle has occurred after stopping it. Women should inform their doctor if they wish to become pregnant or think they might be pregnant. Men should not father a child while taking this medicine and for 3  months after stopping it. There is a potential for serious side effects to an unborn child. Talk to your health care professional or pharmacist for more information. Do not breast-feed an infant while taking this medicine.  NOTE:This sheet is a summary. It may not cover all possible information. If you have questions about this medicine, talk to your doctor, pharmacist, or health care provider. Copyright© 2017 [employer]      Read on Leflunomide    Leflunomide tablets  What is this medicine?  LEFLUNOMIDE (le FLOO na mide) is for rheumatoid arthritis.  How should I use this medicine?  Take this medicine by mouth with a full glass of water. Follow the directions on the prescription label. Take your medicine at regular intervals. Do not take your medicine more often than directed. Do not stop taking except on your doctor's advice.  Talk to your pediatrician regarding the use of this medicine in children. Special care may be needed.  What side effects may I notice from receiving this medicine?  Side effects that you should report to your doctor or health care professional as soon as possible:  · allergic reactions like skin rash, itching or hives, swelling of the face, lips, or tongue  · cough  · difficulty breathing or shortness of breath  · fever, chills or any other sign of infection  · redness, blistering, peeling or loosening of the skin, including inside the mouth  · unusual bleeding or bruising  · unusually weak or tired  · vomiting  · yellowing of eyes or skin  Side effects that usually do not require medical attention (report to your doctor or health care professional if they continue or are bothersome):  · diarrhea  · hair loss  · headache  · nausea  What may interact with this medicine?  Do not take this medicine with any of the following medications:  · teriflunomide  This medicine may also interact with the following medications:  · charcoal  · cholestyramine  · methotrexate  · NSAIDs, medicines for pain  and inflammation, like ibuprofen or naproxen  · phenytoin  · rifampin  · tolbutamide  · vaccines  · warfarin  What if I miss a dose?  If you miss a dose, take it as soon as you can. If it is almost time for your next dose, take only that dose. Do not take double or extra doses.  Where should I keep my medicine?  Keep out of the reach of children.  Store at room temperature between 15 and 30 degrees C (59 and 86 degrees F). Protect from moisture and light. Throw away any unused medicine after the expiration date.  What should I tell my health care provider before I take this medicine?  They need to know if you have any of these conditions:  · alcoholism  · bone marrow problems  · fever or infection  · immune system problems  · kidney disease  · liver disease  · an unusual or allergic reaction to leflunomide, teriflunomide, other medicines, lactose, foods, dyes, or preservatives  · pregnant or trying to get pregnant  · breast-feeding  What should I watch for while using this medicine?  Visit your doctor or health care professional for regular checks on your progress. You will need frequent blood checks while you are receiving the medicine.  If you get a cold or other infection while receiving this medicine, call your doctor or health care professional. Do not treat yourself. The medicine may increase your risk of getting an infection.  If you are a woman who has the potential to become pregnant, discuss birth control options with your doctor or health care professional. You must not be pregnant, and you must be using a reliable form of birth control. The medicine may harm an unborn baby. Immediately call your doctor if you think you might be pregnant.  Alcoholic drinks may increase possible damage to your liver. Do not drink alcohol while taking this medicine.  NOTE:This sheet is a summary. It may not cover all possible information. If you have questions about this medicine, talk to your doctor, pharmacist, or health  care provider. Copyright© 2017 [employer]

## 2020-12-27 ENCOUNTER — NURSE TRIAGE (OUTPATIENT)
Dept: ADMINISTRATIVE | Facility: CLINIC | Age: 75
End: 2020-12-27

## 2020-12-27 NOTE — TELEPHONE ENCOUNTER
Patient reports severe rheumatoid arthritis pain. Patient reports right knee swelling, right shoulder pain and fingers are in pain. Patient was advised per protocol and was advised to call back with questions, concerns or symptoms. Patient verbalized understanding.     Reason for Disposition   [1] SEVERE pain (e.g., excruciating, unable to walk) AND [2] not improved after 2 hours of pain medicine    Additional Information   Negative: Followed a knee injury   Negative: Thigh or calf swelling is main symptom   Negative: Knee pain is main symptom   Negative: Fever   Negative: Patient sounds very sick or weak to the triager    Protocols used: KNEE SWELLING-A-AH

## 2020-12-28 ENCOUNTER — HOSPITAL ENCOUNTER (OUTPATIENT)
Dept: RADIOLOGY | Facility: HOSPITAL | Age: 75
Discharge: HOME | End: 2020-12-28
Attending: INTERNAL MEDICINE
Payer: MEDICARE

## 2020-12-28 ENCOUNTER — PATIENT MESSAGE (OUTPATIENT)
Dept: RHEUMATOLOGY | Facility: CLINIC | Age: 75
End: 2020-12-28

## 2020-12-28 DIAGNOSIS — M05.79 RHEUMATOID ARTHRITIS INVOLVING MULTIPLE SITES WITH POSITIVE RHEUMATOID FACTOR: ICD-10-CM

## 2020-12-28 PROCEDURE — 73130 X-RAY EXAM OF HAND: CPT | Mod: 26,59,HCNC,LT | Performed by: RADIOLOGY

## 2020-12-28 PROCEDURE — 73130 X-RAY EXAM OF HAND: CPT | Mod: 26,HCNC,RT, | Performed by: RADIOLOGY

## 2020-12-28 PROCEDURE — 73130 XR HAND COMPLETE 3 VIEWS BILATERAL: ICD-10-PCS | Mod: 26,HCNC,RT, | Performed by: RADIOLOGY

## 2020-12-28 PROCEDURE — 77077 JOINT SURVEY SINGLE VIEW: CPT | Mod: 26,HCNC,, | Performed by: RADIOLOGY

## 2020-12-28 PROCEDURE — 77077 XR ARTHRITIS SURVEY: ICD-10-PCS | Mod: 26,HCNC,, | Performed by: RADIOLOGY

## 2020-12-28 PROCEDURE — 77077 JOINT SURVEY SINGLE VIEW: CPT | Mod: TC,HCNC

## 2020-12-28 PROCEDURE — 73130 X-RAY EXAM OF HAND: CPT | Mod: TC,50,HCNC

## 2020-12-28 RX ORDER — METHYLPREDNISOLONE 4 MG/1
TABLET ORAL
Qty: 1 PACKAGE | Refills: 0 | Status: SHIPPED | OUTPATIENT
Start: 2020-12-28 | End: 2021-01-18

## 2020-12-29 ENCOUNTER — PATIENT MESSAGE (OUTPATIENT)
Dept: RHEUMATOLOGY | Facility: CLINIC | Age: 75
End: 2020-12-29

## 2020-12-30 ENCOUNTER — PATIENT MESSAGE (OUTPATIENT)
Dept: RHEUMATOLOGY | Facility: CLINIC | Age: 75
End: 2020-12-30

## 2020-12-30 ENCOUNTER — OFFICE VISIT (OUTPATIENT)
Dept: INTERNAL MEDICINE | Facility: CLINIC | Age: 75
End: 2020-12-30
Payer: MEDICARE

## 2020-12-30 VITALS
HEART RATE: 74 BPM | BODY MASS INDEX: 28.28 KG/M2 | WEIGHT: 169.75 LBS | HEIGHT: 65 IN | SYSTOLIC BLOOD PRESSURE: 132 MMHG | DIASTOLIC BLOOD PRESSURE: 80 MMHG | OXYGEN SATURATION: 96 %

## 2020-12-30 DIAGNOSIS — M35.00 SJOGREN'S SYNDROME, WITH UNSPECIFIED ORGAN INVOLVEMENT: ICD-10-CM

## 2020-12-30 DIAGNOSIS — G56.02 CARPAL TUNNEL SYNDROME OF LEFT WRIST: ICD-10-CM

## 2020-12-30 DIAGNOSIS — Z00.00 ANNUAL PHYSICAL EXAM: Primary | ICD-10-CM

## 2020-12-30 DIAGNOSIS — Z80.0 FAMILY HISTORY OF MALIGNANT NEOPLASM OF COLON: ICD-10-CM

## 2020-12-30 DIAGNOSIS — Z12.11 ENCOUNTER FOR SCREENING FOR COLORECTAL MALIGNANT NEOPLASM: ICD-10-CM

## 2020-12-30 DIAGNOSIS — Z12.12 ENCOUNTER FOR SCREENING FOR COLORECTAL MALIGNANT NEOPLASM: ICD-10-CM

## 2020-12-30 DIAGNOSIS — I70.0 AORTIC ATHEROSCLEROSIS: ICD-10-CM

## 2020-12-30 DIAGNOSIS — M15.9 PRIMARY OSTEOARTHRITIS INVOLVING MULTIPLE JOINTS: ICD-10-CM

## 2020-12-30 DIAGNOSIS — M05.79 RHEUMATOID ARTHRITIS INVOLVING MULTIPLE SITES WITH POSITIVE RHEUMATOID FACTOR: ICD-10-CM

## 2020-12-30 DIAGNOSIS — K21.9 GASTROESOPHAGEAL REFLUX DISEASE WITHOUT ESOPHAGITIS: ICD-10-CM

## 2020-12-30 DIAGNOSIS — M85.80 OSTEOPENIA DETERMINED BY X-RAY: ICD-10-CM

## 2020-12-30 PROCEDURE — 3008F PR BODY MASS INDEX (BMI) DOCUMENTED: ICD-10-PCS | Mod: CPTII,S$GLB,, | Performed by: INTERNAL MEDICINE

## 2020-12-30 PROCEDURE — 1126F AMNT PAIN NOTED NONE PRSNT: CPT | Mod: S$GLB,,, | Performed by: INTERNAL MEDICINE

## 2020-12-30 PROCEDURE — 3075F PR MOST RECENT SYSTOLIC BLOOD PRESS GE 130-139MM HG: ICD-10-PCS | Mod: CPTII,S$GLB,, | Performed by: INTERNAL MEDICINE

## 2020-12-30 PROCEDURE — 99397 PR PREVENTIVE VISIT,EST,65 & OVER: ICD-10-PCS | Mod: S$GLB,,, | Performed by: INTERNAL MEDICINE

## 2020-12-30 PROCEDURE — 3079F DIAST BP 80-89 MM HG: CPT | Mod: CPTII,S$GLB,, | Performed by: INTERNAL MEDICINE

## 2020-12-30 PROCEDURE — 3288F PR FALLS RISK ASSESSMENT DOCUMENTED: ICD-10-PCS | Mod: CPTII,S$GLB,, | Performed by: INTERNAL MEDICINE

## 2020-12-30 PROCEDURE — 3075F SYST BP GE 130 - 139MM HG: CPT | Mod: CPTII,S$GLB,, | Performed by: INTERNAL MEDICINE

## 2020-12-30 PROCEDURE — 1157F ADVNC CARE PLAN IN RCRD: CPT | Mod: S$GLB,,, | Performed by: INTERNAL MEDICINE

## 2020-12-30 PROCEDURE — 1126F PR PAIN SEVERITY QUANTIFIED, NO PAIN PRESENT: ICD-10-PCS | Mod: S$GLB,,, | Performed by: INTERNAL MEDICINE

## 2020-12-30 PROCEDURE — 1101F PT FALLS ASSESS-DOCD LE1/YR: CPT | Mod: CPTII,S$GLB,, | Performed by: INTERNAL MEDICINE

## 2020-12-30 PROCEDURE — 99999 PR PBB SHADOW E&M-EST. PATIENT-LVL V: ICD-10-PCS | Mod: PBBFAC,,, | Performed by: INTERNAL MEDICINE

## 2020-12-30 PROCEDURE — 3079F PR MOST RECENT DIASTOLIC BLOOD PRESSURE 80-89 MM HG: ICD-10-PCS | Mod: CPTII,S$GLB,, | Performed by: INTERNAL MEDICINE

## 2020-12-30 PROCEDURE — 99999 PR PBB SHADOW E&M-EST. PATIENT-LVL V: CPT | Mod: PBBFAC,,, | Performed by: INTERNAL MEDICINE

## 2020-12-30 PROCEDURE — 1157F PR ADVANCE CARE PLAN OR EQUIV PRESENT IN MEDICAL RECORD: ICD-10-PCS | Mod: S$GLB,,, | Performed by: INTERNAL MEDICINE

## 2020-12-30 PROCEDURE — 1101F PR PT FALLS ASSESS DOC 0-1 FALLS W/OUT INJ PAST YR: ICD-10-PCS | Mod: CPTII,S$GLB,, | Performed by: INTERNAL MEDICINE

## 2020-12-30 PROCEDURE — 99397 PER PM REEVAL EST PAT 65+ YR: CPT | Mod: S$GLB,,, | Performed by: INTERNAL MEDICINE

## 2020-12-30 PROCEDURE — 3288F FALL RISK ASSESSMENT DOCD: CPT | Mod: CPTII,S$GLB,, | Performed by: INTERNAL MEDICINE

## 2020-12-30 PROCEDURE — 3008F BODY MASS INDEX DOCD: CPT | Mod: CPTII,S$GLB,, | Performed by: INTERNAL MEDICINE

## 2020-12-30 RX ORDER — LEFLUNOMIDE 20 MG/1
20 TABLET ORAL DAILY
Qty: 30 TABLET | Refills: 3 | Status: SHIPPED | OUTPATIENT
Start: 2020-12-30 | End: 2021-03-01 | Stop reason: SDUPTHER

## 2020-12-31 ENCOUNTER — TELEPHONE (OUTPATIENT)
Dept: RHEUMATOLOGY | Facility: CLINIC | Age: 75
End: 2020-12-31

## 2020-12-31 DIAGNOSIS — M05.79 RHEUMATOID ARTHRITIS INVOLVING MULTIPLE SITES WITH POSITIVE RHEUMATOID FACTOR: ICD-10-CM

## 2020-12-31 RX ORDER — FOLIC ACID 1 MG/1
1 TABLET ORAL DAILY
Qty: 100 TABLET | Refills: 3 | Status: SHIPPED | OUTPATIENT
Start: 2020-12-31 | End: 2020-12-31 | Stop reason: CLARIF

## 2021-01-13 ENCOUNTER — PATIENT MESSAGE (OUTPATIENT)
Dept: RHEUMATOLOGY | Facility: CLINIC | Age: 76
End: 2021-01-13

## 2021-01-15 ENCOUNTER — PATIENT MESSAGE (OUTPATIENT)
Dept: RHEUMATOLOGY | Facility: CLINIC | Age: 76
End: 2021-01-15

## 2021-01-15 ENCOUNTER — NURSE TRIAGE (OUTPATIENT)
Dept: ADMINISTRATIVE | Facility: CLINIC | Age: 76
End: 2021-01-15

## 2021-01-18 ENCOUNTER — PATIENT MESSAGE (OUTPATIENT)
Dept: ORTHOPEDICS | Facility: CLINIC | Age: 76
End: 2021-01-18

## 2021-01-20 ENCOUNTER — PATIENT MESSAGE (OUTPATIENT)
Dept: RHEUMATOLOGY | Facility: CLINIC | Age: 76
End: 2021-01-20

## 2021-01-21 ENCOUNTER — PATIENT OUTREACH (OUTPATIENT)
Dept: ADMINISTRATIVE | Facility: OTHER | Age: 76
End: 2021-01-21

## 2021-01-22 ENCOUNTER — OFFICE VISIT (OUTPATIENT)
Dept: ORTHOPEDICS | Facility: CLINIC | Age: 76
End: 2021-01-22
Payer: MEDICARE

## 2021-01-22 VITALS
SYSTOLIC BLOOD PRESSURE: 143 MMHG | DIASTOLIC BLOOD PRESSURE: 73 MMHG | WEIGHT: 166.56 LBS | BODY MASS INDEX: 27.75 KG/M2 | HEART RATE: 90 BPM | HEIGHT: 65 IN

## 2021-01-22 DIAGNOSIS — M17.0 ARTHRITIS OF BOTH KNEES: ICD-10-CM

## 2021-01-22 DIAGNOSIS — M15.9 PRIMARY OSTEOARTHRITIS INVOLVING MULTIPLE JOINTS: Primary | ICD-10-CM

## 2021-01-22 PROCEDURE — 99213 PR OFFICE/OUTPT VISIT, EST, LEVL III, 20-29 MIN: ICD-10-PCS | Mod: S$GLB,,, | Performed by: PHYSICIAN ASSISTANT

## 2021-01-22 PROCEDURE — 1101F PR PT FALLS ASSESS DOC 0-1 FALLS W/OUT INJ PAST YR: ICD-10-PCS | Mod: CPTII,S$GLB,, | Performed by: PHYSICIAN ASSISTANT

## 2021-01-22 PROCEDURE — 1125F AMNT PAIN NOTED PAIN PRSNT: CPT | Mod: S$GLB,,, | Performed by: PHYSICIAN ASSISTANT

## 2021-01-22 PROCEDURE — 3008F PR BODY MASS INDEX (BMI) DOCUMENTED: ICD-10-PCS | Mod: CPTII,S$GLB,, | Performed by: PHYSICIAN ASSISTANT

## 2021-01-22 PROCEDURE — 99999 PR PBB SHADOW E&M-EST. PATIENT-LVL IV: ICD-10-PCS | Mod: PBBFAC,HCNC,, | Performed by: PHYSICIAN ASSISTANT

## 2021-01-22 PROCEDURE — 3078F DIAST BP <80 MM HG: CPT | Mod: CPTII,S$GLB,, | Performed by: PHYSICIAN ASSISTANT

## 2021-01-22 PROCEDURE — 1159F MED LIST DOCD IN RCRD: CPT | Mod: S$GLB,,, | Performed by: PHYSICIAN ASSISTANT

## 2021-01-22 PROCEDURE — 1157F ADVNC CARE PLAN IN RCRD: CPT | Mod: S$GLB,,, | Performed by: PHYSICIAN ASSISTANT

## 2021-01-22 PROCEDURE — 3077F PR MOST RECENT SYSTOLIC BLOOD PRESSURE >= 140 MM HG: ICD-10-PCS | Mod: CPTII,S$GLB,, | Performed by: PHYSICIAN ASSISTANT

## 2021-01-22 PROCEDURE — 1157F PR ADVANCE CARE PLAN OR EQUIV PRESENT IN MEDICAL RECORD: ICD-10-PCS | Mod: S$GLB,,, | Performed by: PHYSICIAN ASSISTANT

## 2021-01-22 PROCEDURE — 3008F BODY MASS INDEX DOCD: CPT | Mod: CPTII,S$GLB,, | Performed by: PHYSICIAN ASSISTANT

## 2021-01-22 PROCEDURE — 1159F PR MEDICATION LIST DOCUMENTED IN MEDICAL RECORD: ICD-10-PCS | Mod: S$GLB,,, | Performed by: PHYSICIAN ASSISTANT

## 2021-01-22 PROCEDURE — 1101F PT FALLS ASSESS-DOCD LE1/YR: CPT | Mod: CPTII,S$GLB,, | Performed by: PHYSICIAN ASSISTANT

## 2021-01-22 PROCEDURE — 3078F PR MOST RECENT DIASTOLIC BLOOD PRESSURE < 80 MM HG: ICD-10-PCS | Mod: CPTII,S$GLB,, | Performed by: PHYSICIAN ASSISTANT

## 2021-01-22 PROCEDURE — 1125F PR PAIN SEVERITY QUANTIFIED, PAIN PRESENT: ICD-10-PCS | Mod: S$GLB,,, | Performed by: PHYSICIAN ASSISTANT

## 2021-01-22 PROCEDURE — 99999 PR PBB SHADOW E&M-EST. PATIENT-LVL IV: CPT | Mod: PBBFAC,HCNC,, | Performed by: PHYSICIAN ASSISTANT

## 2021-01-22 PROCEDURE — 3288F PR FALLS RISK ASSESSMENT DOCUMENTED: ICD-10-PCS | Mod: CPTII,S$GLB,, | Performed by: PHYSICIAN ASSISTANT

## 2021-01-22 PROCEDURE — 99213 OFFICE O/P EST LOW 20 MIN: CPT | Mod: S$GLB,,, | Performed by: PHYSICIAN ASSISTANT

## 2021-01-22 PROCEDURE — 3077F SYST BP >= 140 MM HG: CPT | Mod: CPTII,S$GLB,, | Performed by: PHYSICIAN ASSISTANT

## 2021-01-22 PROCEDURE — 3288F FALL RISK ASSESSMENT DOCD: CPT | Mod: CPTII,S$GLB,, | Performed by: PHYSICIAN ASSISTANT

## 2021-01-27 ENCOUNTER — TELEPHONE (OUTPATIENT)
Dept: ORTHOPEDICS | Facility: CLINIC | Age: 76
End: 2021-01-27

## 2021-01-29 ENCOUNTER — OFFICE VISIT (OUTPATIENT)
Dept: ORTHOPEDICS | Facility: CLINIC | Age: 76
End: 2021-01-29
Payer: MEDICARE

## 2021-01-29 VITALS — DIASTOLIC BLOOD PRESSURE: 74 MMHG | HEART RATE: 93 BPM | SYSTOLIC BLOOD PRESSURE: 126 MMHG

## 2021-01-29 DIAGNOSIS — M15.9 PRIMARY OSTEOARTHRITIS INVOLVING MULTIPLE JOINTS: Primary | ICD-10-CM

## 2021-01-29 PROCEDURE — 1157F ADVNC CARE PLAN IN RCRD: CPT | Mod: S$GLB,,, | Performed by: PHYSICIAN ASSISTANT

## 2021-01-29 PROCEDURE — 1125F PR PAIN SEVERITY QUANTIFIED, PAIN PRESENT: ICD-10-PCS | Mod: S$GLB,,, | Performed by: PHYSICIAN ASSISTANT

## 2021-01-29 PROCEDURE — 3288F FALL RISK ASSESSMENT DOCD: CPT | Mod: CPTII,S$GLB,, | Performed by: PHYSICIAN ASSISTANT

## 2021-01-29 PROCEDURE — 99499 NO LOS: ICD-10-PCS | Mod: S$GLB,,, | Performed by: PHYSICIAN ASSISTANT

## 2021-01-29 PROCEDURE — 99999 PR PBB SHADOW E&M-EST. PATIENT-LVL III: ICD-10-PCS | Mod: PBBFAC,,, | Performed by: PHYSICIAN ASSISTANT

## 2021-01-29 PROCEDURE — 99999 PR PBB SHADOW E&M-EST. PATIENT-LVL III: CPT | Mod: PBBFAC,,, | Performed by: PHYSICIAN ASSISTANT

## 2021-01-29 PROCEDURE — 1101F PT FALLS ASSESS-DOCD LE1/YR: CPT | Mod: CPTII,S$GLB,, | Performed by: PHYSICIAN ASSISTANT

## 2021-01-29 PROCEDURE — 1125F AMNT PAIN NOTED PAIN PRSNT: CPT | Mod: S$GLB,,, | Performed by: PHYSICIAN ASSISTANT

## 2021-01-29 PROCEDURE — 3288F PR FALLS RISK ASSESSMENT DOCUMENTED: ICD-10-PCS | Mod: CPTII,S$GLB,, | Performed by: PHYSICIAN ASSISTANT

## 2021-01-29 PROCEDURE — 20610 DRAIN/INJ JOINT/BURSA W/O US: CPT | Mod: 50,S$GLB,, | Performed by: PHYSICIAN ASSISTANT

## 2021-01-29 PROCEDURE — 20610 PR DRAIN/INJECT LARGE JOINT/BURSA: ICD-10-PCS | Mod: 50,S$GLB,, | Performed by: PHYSICIAN ASSISTANT

## 2021-01-29 PROCEDURE — 1101F PR PT FALLS ASSESS DOC 0-1 FALLS W/OUT INJ PAST YR: ICD-10-PCS | Mod: CPTII,S$GLB,, | Performed by: PHYSICIAN ASSISTANT

## 2021-01-29 PROCEDURE — 1157F PR ADVANCE CARE PLAN OR EQUIV PRESENT IN MEDICAL RECORD: ICD-10-PCS | Mod: S$GLB,,, | Performed by: PHYSICIAN ASSISTANT

## 2021-01-29 PROCEDURE — 99499 UNLISTED E&M SERVICE: CPT | Mod: S$GLB,,, | Performed by: PHYSICIAN ASSISTANT

## 2021-02-01 ENCOUNTER — OFFICE VISIT (OUTPATIENT)
Dept: INTERNAL MEDICINE | Facility: CLINIC | Age: 76
End: 2021-02-01
Payer: MEDICARE

## 2021-02-01 ENCOUNTER — NURSE TRIAGE (OUTPATIENT)
Dept: ADMINISTRATIVE | Facility: CLINIC | Age: 76
End: 2021-02-01

## 2021-02-01 ENCOUNTER — PATIENT MESSAGE (OUTPATIENT)
Dept: INTERNAL MEDICINE | Facility: CLINIC | Age: 76
End: 2021-02-01

## 2021-02-01 VITALS
BODY MASS INDEX: 27.85 KG/M2 | WEIGHT: 167.13 LBS | TEMPERATURE: 98 F | DIASTOLIC BLOOD PRESSURE: 80 MMHG | OXYGEN SATURATION: 98 % | HEART RATE: 81 BPM | HEIGHT: 65 IN | SYSTOLIC BLOOD PRESSURE: 140 MMHG

## 2021-02-01 DIAGNOSIS — R07.89 CHEST DISCOMFORT: Primary | ICD-10-CM

## 2021-02-01 DIAGNOSIS — K21.9 GASTROESOPHAGEAL REFLUX DISEASE WITHOUT ESOPHAGITIS: ICD-10-CM

## 2021-02-01 DIAGNOSIS — R03.0 ELEVATED BLOOD PRESSURE READING: ICD-10-CM

## 2021-02-01 PROCEDURE — 93010 EKG 12-LEAD: ICD-10-PCS | Mod: S$GLB,,, | Performed by: INTERNAL MEDICINE

## 2021-02-01 PROCEDURE — 1101F PR PT FALLS ASSESS DOC 0-1 FALLS W/OUT INJ PAST YR: ICD-10-PCS | Mod: CPTII,S$GLB,, | Performed by: FAMILY MEDICINE

## 2021-02-01 PROCEDURE — 93005 ELECTROCARDIOGRAM TRACING: CPT | Mod: S$GLB,,, | Performed by: FAMILY MEDICINE

## 2021-02-01 PROCEDURE — 3008F PR BODY MASS INDEX (BMI) DOCUMENTED: ICD-10-PCS | Mod: CPTII,S$GLB,, | Performed by: FAMILY MEDICINE

## 2021-02-01 PROCEDURE — 3074F SYST BP LT 130 MM HG: CPT | Mod: CPTII,S$GLB,, | Performed by: FAMILY MEDICINE

## 2021-02-01 PROCEDURE — 3079F DIAST BP 80-89 MM HG: CPT | Mod: CPTII,S$GLB,, | Performed by: FAMILY MEDICINE

## 2021-02-01 PROCEDURE — 3008F BODY MASS INDEX DOCD: CPT | Mod: CPTII,S$GLB,, | Performed by: FAMILY MEDICINE

## 2021-02-01 PROCEDURE — 1159F MED LIST DOCD IN RCRD: CPT | Mod: S$GLB,,, | Performed by: FAMILY MEDICINE

## 2021-02-01 PROCEDURE — 99213 PR OFFICE/OUTPT VISIT, EST, LEVL III, 20-29 MIN: ICD-10-PCS | Mod: S$GLB,,, | Performed by: FAMILY MEDICINE

## 2021-02-01 PROCEDURE — 1125F PR PAIN SEVERITY QUANTIFIED, PAIN PRESENT: ICD-10-PCS | Mod: S$GLB,,, | Performed by: FAMILY MEDICINE

## 2021-02-01 PROCEDURE — 93010 ELECTROCARDIOGRAM REPORT: CPT | Mod: S$GLB,,, | Performed by: INTERNAL MEDICINE

## 2021-02-01 PROCEDURE — 3079F PR MOST RECENT DIASTOLIC BLOOD PRESSURE 80-89 MM HG: ICD-10-PCS | Mod: CPTII,S$GLB,, | Performed by: FAMILY MEDICINE

## 2021-02-01 PROCEDURE — 93005 EKG 12-LEAD: ICD-10-PCS | Mod: S$GLB,,, | Performed by: FAMILY MEDICINE

## 2021-02-01 PROCEDURE — 1157F PR ADVANCE CARE PLAN OR EQUIV PRESENT IN MEDICAL RECORD: ICD-10-PCS | Mod: S$GLB,,, | Performed by: FAMILY MEDICINE

## 2021-02-01 PROCEDURE — 99213 OFFICE O/P EST LOW 20 MIN: CPT | Mod: S$GLB,,, | Performed by: FAMILY MEDICINE

## 2021-02-01 PROCEDURE — 3288F PR FALLS RISK ASSESSMENT DOCUMENTED: ICD-10-PCS | Mod: CPTII,S$GLB,, | Performed by: FAMILY MEDICINE

## 2021-02-01 PROCEDURE — 1159F PR MEDICATION LIST DOCUMENTED IN MEDICAL RECORD: ICD-10-PCS | Mod: S$GLB,,, | Performed by: FAMILY MEDICINE

## 2021-02-01 PROCEDURE — 99999 PR PBB SHADOW E&M-EST. PATIENT-LVL IV: ICD-10-PCS | Mod: PBBFAC,,, | Performed by: FAMILY MEDICINE

## 2021-02-01 PROCEDURE — 3074F PR MOST RECENT SYSTOLIC BLOOD PRESSURE < 130 MM HG: ICD-10-PCS | Mod: CPTII,S$GLB,, | Performed by: FAMILY MEDICINE

## 2021-02-01 PROCEDURE — 3288F FALL RISK ASSESSMENT DOCD: CPT | Mod: CPTII,S$GLB,, | Performed by: FAMILY MEDICINE

## 2021-02-01 PROCEDURE — 1157F ADVNC CARE PLAN IN RCRD: CPT | Mod: S$GLB,,, | Performed by: FAMILY MEDICINE

## 2021-02-01 PROCEDURE — 1101F PT FALLS ASSESS-DOCD LE1/YR: CPT | Mod: CPTII,S$GLB,, | Performed by: FAMILY MEDICINE

## 2021-02-01 PROCEDURE — 1125F AMNT PAIN NOTED PAIN PRSNT: CPT | Mod: S$GLB,,, | Performed by: FAMILY MEDICINE

## 2021-02-01 PROCEDURE — 99999 PR PBB SHADOW E&M-EST. PATIENT-LVL IV: CPT | Mod: PBBFAC,,, | Performed by: FAMILY MEDICINE

## 2021-02-01 RX ORDER — FAMOTIDINE 20 MG/1
20 TABLET, FILM COATED ORAL 2 TIMES DAILY
Qty: 60 TABLET | Refills: 0 | Status: SHIPPED | OUTPATIENT
Start: 2021-02-01 | End: 2021-03-01 | Stop reason: SDUPTHER

## 2021-02-05 ENCOUNTER — OFFICE VISIT (OUTPATIENT)
Dept: ORTHOPEDICS | Facility: CLINIC | Age: 76
End: 2021-02-05
Payer: MEDICARE

## 2021-02-05 VITALS — HEART RATE: 72 BPM | SYSTOLIC BLOOD PRESSURE: 132 MMHG | DIASTOLIC BLOOD PRESSURE: 88 MMHG

## 2021-02-05 DIAGNOSIS — M15.9 PRIMARY OSTEOARTHRITIS INVOLVING MULTIPLE JOINTS: ICD-10-CM

## 2021-02-05 DIAGNOSIS — M17.0 ARTHRITIS OF BOTH KNEES: Primary | ICD-10-CM

## 2021-02-05 PROCEDURE — 1101F PT FALLS ASSESS-DOCD LE1/YR: CPT | Mod: CPTII,S$GLB,, | Performed by: PHYSICIAN ASSISTANT

## 2021-02-05 PROCEDURE — 3288F FALL RISK ASSESSMENT DOCD: CPT | Mod: CPTII,S$GLB,, | Performed by: PHYSICIAN ASSISTANT

## 2021-02-05 PROCEDURE — 20610 PR DRAIN/INJECT LARGE JOINT/BURSA: ICD-10-PCS | Mod: 50,S$GLB,, | Performed by: PHYSICIAN ASSISTANT

## 2021-02-05 PROCEDURE — 20610 DRAIN/INJ JOINT/BURSA W/O US: CPT | Mod: 50,S$GLB,, | Performed by: PHYSICIAN ASSISTANT

## 2021-02-05 PROCEDURE — 99999 PR PBB SHADOW E&M-EST. PATIENT-LVL III: ICD-10-PCS | Mod: PBBFAC,,, | Performed by: PHYSICIAN ASSISTANT

## 2021-02-05 PROCEDURE — 3288F PR FALLS RISK ASSESSMENT DOCUMENTED: ICD-10-PCS | Mod: CPTII,S$GLB,, | Performed by: PHYSICIAN ASSISTANT

## 2021-02-05 PROCEDURE — 99499 UNLISTED E&M SERVICE: CPT | Mod: S$GLB,,, | Performed by: PHYSICIAN ASSISTANT

## 2021-02-05 PROCEDURE — 1101F PR PT FALLS ASSESS DOC 0-1 FALLS W/OUT INJ PAST YR: ICD-10-PCS | Mod: CPTII,S$GLB,, | Performed by: PHYSICIAN ASSISTANT

## 2021-02-05 PROCEDURE — 99999 PR PBB SHADOW E&M-EST. PATIENT-LVL III: CPT | Mod: PBBFAC,,, | Performed by: PHYSICIAN ASSISTANT

## 2021-02-05 PROCEDURE — 1157F ADVNC CARE PLAN IN RCRD: CPT | Mod: S$GLB,,, | Performed by: PHYSICIAN ASSISTANT

## 2021-02-05 PROCEDURE — 99499 NO LOS: ICD-10-PCS | Mod: S$GLB,,, | Performed by: PHYSICIAN ASSISTANT

## 2021-02-05 PROCEDURE — 1157F PR ADVANCE CARE PLAN OR EQUIV PRESENT IN MEDICAL RECORD: ICD-10-PCS | Mod: S$GLB,,, | Performed by: PHYSICIAN ASSISTANT

## 2021-02-08 ENCOUNTER — PATIENT MESSAGE (OUTPATIENT)
Dept: INTERNAL MEDICINE | Facility: CLINIC | Age: 76
End: 2021-02-08

## 2021-02-08 ENCOUNTER — TELEPHONE (OUTPATIENT)
Dept: INTERNAL MEDICINE | Facility: CLINIC | Age: 76
End: 2021-02-08

## 2021-02-12 ENCOUNTER — OFFICE VISIT (OUTPATIENT)
Dept: ORTHOPEDICS | Facility: CLINIC | Age: 76
End: 2021-02-12
Payer: MEDICARE

## 2021-02-12 ENCOUNTER — PATIENT MESSAGE (OUTPATIENT)
Dept: RHEUMATOLOGY | Facility: CLINIC | Age: 76
End: 2021-02-12

## 2021-02-12 VITALS
BODY MASS INDEX: 27.47 KG/M2 | WEIGHT: 164.88 LBS | SYSTOLIC BLOOD PRESSURE: 149 MMHG | HEART RATE: 90 BPM | DIASTOLIC BLOOD PRESSURE: 79 MMHG | HEIGHT: 65 IN

## 2021-02-12 DIAGNOSIS — M17.0 ARTHRITIS OF BOTH KNEES: Primary | ICD-10-CM

## 2021-02-12 PROCEDURE — 3008F PR BODY MASS INDEX (BMI) DOCUMENTED: ICD-10-PCS | Mod: CPTII,S$GLB,, | Performed by: PHYSICIAN ASSISTANT

## 2021-02-12 PROCEDURE — 99499 UNLISTED E&M SERVICE: CPT | Mod: S$GLB,,, | Performed by: PHYSICIAN ASSISTANT

## 2021-02-12 PROCEDURE — 1101F PR PT FALLS ASSESS DOC 0-1 FALLS W/OUT INJ PAST YR: ICD-10-PCS | Mod: CPTII,S$GLB,, | Performed by: PHYSICIAN ASSISTANT

## 2021-02-12 PROCEDURE — 1157F PR ADVANCE CARE PLAN OR EQUIV PRESENT IN MEDICAL RECORD: ICD-10-PCS | Mod: S$GLB,,, | Performed by: PHYSICIAN ASSISTANT

## 2021-02-12 PROCEDURE — 20610 PR DRAIN/INJECT LARGE JOINT/BURSA: ICD-10-PCS | Mod: 50,S$GLB,, | Performed by: PHYSICIAN ASSISTANT

## 2021-02-12 PROCEDURE — 99999 PR PBB SHADOW E&M-EST. PATIENT-LVL III: CPT | Mod: PBBFAC,,, | Performed by: PHYSICIAN ASSISTANT

## 2021-02-12 PROCEDURE — 3288F FALL RISK ASSESSMENT DOCD: CPT | Mod: CPTII,S$GLB,, | Performed by: PHYSICIAN ASSISTANT

## 2021-02-12 PROCEDURE — 1125F AMNT PAIN NOTED PAIN PRSNT: CPT | Mod: S$GLB,,, | Performed by: PHYSICIAN ASSISTANT

## 2021-02-12 PROCEDURE — 99999 PR PBB SHADOW E&M-EST. PATIENT-LVL III: ICD-10-PCS | Mod: PBBFAC,,, | Performed by: PHYSICIAN ASSISTANT

## 2021-02-12 PROCEDURE — 3008F BODY MASS INDEX DOCD: CPT | Mod: CPTII,S$GLB,, | Performed by: PHYSICIAN ASSISTANT

## 2021-02-12 PROCEDURE — 1157F ADVNC CARE PLAN IN RCRD: CPT | Mod: S$GLB,,, | Performed by: PHYSICIAN ASSISTANT

## 2021-02-12 PROCEDURE — 99499 NO LOS: ICD-10-PCS | Mod: S$GLB,,, | Performed by: PHYSICIAN ASSISTANT

## 2021-02-12 PROCEDURE — 1125F PR PAIN SEVERITY QUANTIFIED, PAIN PRESENT: ICD-10-PCS | Mod: S$GLB,,, | Performed by: PHYSICIAN ASSISTANT

## 2021-02-12 PROCEDURE — 3288F PR FALLS RISK ASSESSMENT DOCUMENTED: ICD-10-PCS | Mod: CPTII,S$GLB,, | Performed by: PHYSICIAN ASSISTANT

## 2021-02-12 PROCEDURE — 1101F PT FALLS ASSESS-DOCD LE1/YR: CPT | Mod: CPTII,S$GLB,, | Performed by: PHYSICIAN ASSISTANT

## 2021-02-12 PROCEDURE — 20610 DRAIN/INJ JOINT/BURSA W/O US: CPT | Mod: 50,S$GLB,, | Performed by: PHYSICIAN ASSISTANT

## 2021-02-15 DIAGNOSIS — M35.01 SJOGREN'S SYNDROME WITH KERATOCONJUNCTIVITIS SICCA: ICD-10-CM

## 2021-02-15 DIAGNOSIS — M85.80 OSTEOPENIA DETERMINED BY X-RAY: ICD-10-CM

## 2021-02-15 DIAGNOSIS — Z79.899 HIGH RISK MEDICATION USE: ICD-10-CM

## 2021-02-15 DIAGNOSIS — M54.12 CERVICAL RADICULOPATHY: ICD-10-CM

## 2021-02-15 DIAGNOSIS — M05.20 RHEUMATOID ARTERITIS: ICD-10-CM

## 2021-02-15 DIAGNOSIS — M15.9 PRIMARY OSTEOARTHRITIS INVOLVING MULTIPLE JOINTS: ICD-10-CM

## 2021-02-15 DIAGNOSIS — G56.00 CARPAL TUNNEL SYNDROME, UNSPECIFIED LATERALITY: ICD-10-CM

## 2021-02-15 RX ORDER — HYDROXYCHLOROQUINE SULFATE 200 MG/1
TABLET, FILM COATED ORAL
Qty: 180 TABLET | Refills: 1 | Status: SHIPPED | OUTPATIENT
Start: 2021-02-15 | End: 2021-09-21

## 2021-02-23 ENCOUNTER — PATIENT MESSAGE (OUTPATIENT)
Dept: RHEUMATOLOGY | Facility: CLINIC | Age: 76
End: 2021-02-23

## 2021-02-28 ENCOUNTER — PATIENT MESSAGE (OUTPATIENT)
Dept: RHEUMATOLOGY | Facility: CLINIC | Age: 76
End: 2021-02-28

## 2021-02-28 DIAGNOSIS — M05.79 RHEUMATOID ARTHRITIS INVOLVING MULTIPLE SITES WITH POSITIVE RHEUMATOID FACTOR: Primary | ICD-10-CM

## 2021-02-28 DIAGNOSIS — R76.12 POSITIVE QUANTIFERON-TB GOLD TEST: ICD-10-CM

## 2021-03-01 ENCOUNTER — PATIENT MESSAGE (OUTPATIENT)
Dept: RHEUMATOLOGY | Facility: CLINIC | Age: 76
End: 2021-03-01

## 2021-03-01 DIAGNOSIS — K21.9 GASTROESOPHAGEAL REFLUX DISEASE WITHOUT ESOPHAGITIS: ICD-10-CM

## 2021-03-01 DIAGNOSIS — R76.12 POSITIVE QUANTIFERON-TB GOLD TEST: ICD-10-CM

## 2021-03-01 DIAGNOSIS — Z79.899 ENCOUNTER FOR LONG-TERM (CURRENT) USE OF OTHER MEDICATIONS: Primary | ICD-10-CM

## 2021-03-01 RX ORDER — LEFLUNOMIDE 20 MG/1
20 TABLET ORAL DAILY
Qty: 30 TABLET | Refills: 3 | Status: SHIPPED | OUTPATIENT
Start: 2021-03-01 | End: 2021-03-01 | Stop reason: SDUPTHER

## 2021-03-01 RX ORDER — FAMOTIDINE 20 MG/1
20 TABLET, FILM COATED ORAL 2 TIMES DAILY PRN
Qty: 60 TABLET | Refills: 1 | Status: SHIPPED | OUTPATIENT
Start: 2021-03-01 | End: 2021-03-03

## 2021-03-01 RX ORDER — LEFLUNOMIDE 20 MG/1
20 TABLET ORAL DAILY
Qty: 90 TABLET | Refills: 1 | Status: SHIPPED | OUTPATIENT
Start: 2021-03-01 | End: 2021-09-22

## 2021-03-04 RX ORDER — FAMOTIDINE 20 MG/1
TABLET, FILM COATED ORAL
Qty: 180 TABLET | Refills: 0 | Status: SHIPPED | OUTPATIENT
Start: 2021-03-04

## 2021-03-05 ENCOUNTER — TELEPHONE (OUTPATIENT)
Dept: RHEUMATOLOGY | Facility: CLINIC | Age: 76
End: 2021-03-05

## 2021-03-05 ENCOUNTER — PATIENT MESSAGE (OUTPATIENT)
Dept: RHEUMATOLOGY | Facility: CLINIC | Age: 76
End: 2021-03-05

## 2021-03-05 ENCOUNTER — LAB VISIT (OUTPATIENT)
Dept: LAB | Facility: HOSPITAL | Age: 76
End: 2021-03-05
Attending: INTERNAL MEDICINE
Payer: MEDICARE

## 2021-03-05 DIAGNOSIS — R76.12 POSITIVE QUANTIFERON-TB GOLD TEST: ICD-10-CM

## 2021-03-05 DIAGNOSIS — Z79.899 ENCOUNTER FOR LONG-TERM (CURRENT) USE OF OTHER MEDICATIONS: ICD-10-CM

## 2021-03-05 DIAGNOSIS — M05.79 RHEUMATOID ARTHRITIS INVOLVING MULTIPLE SITES WITH POSITIVE RHEUMATOID FACTOR: ICD-10-CM

## 2021-03-05 LAB
ALBUMIN SERPL BCP-MCNC: 3.7 G/DL (ref 3.5–5.2)
ALP SERPL-CCNC: 65 U/L (ref 55–135)
ALT SERPL W/O P-5'-P-CCNC: 21 U/L (ref 10–44)
ANION GAP SERPL CALC-SCNC: 8 MMOL/L (ref 8–16)
AST SERPL-CCNC: 29 U/L (ref 10–40)
BASOPHILS # BLD AUTO: 0.16 K/UL (ref 0–0.2)
BASOPHILS NFR BLD: 2.9 % (ref 0–1.9)
BILIRUB SERPL-MCNC: 0.9 MG/DL (ref 0.1–1)
BUN SERPL-MCNC: 13 MG/DL (ref 8–23)
CALCIUM SERPL-MCNC: 9.2 MG/DL (ref 8.7–10.5)
CHLORIDE SERPL-SCNC: 108 MMOL/L (ref 95–110)
CO2 SERPL-SCNC: 28 MMOL/L (ref 23–29)
CREAT SERPL-MCNC: 0.8 MG/DL (ref 0.5–1.4)
CRP SERPL-MCNC: 1.1 MG/L (ref 0–8.2)
DIFFERENTIAL METHOD: ABNORMAL
EOSINOPHIL # BLD AUTO: 0.1 K/UL (ref 0–0.5)
EOSINOPHIL NFR BLD: 2.6 % (ref 0–8)
ERYTHROCYTE [DISTWIDTH] IN BLOOD BY AUTOMATED COUNT: 12.5 % (ref 11.5–14.5)
ERYTHROCYTE [SEDIMENTATION RATE] IN BLOOD BY WESTERGREN METHOD: 15 MM/HR (ref 0–36)
EST. GFR  (AFRICAN AMERICAN): >60 ML/MIN/1.73 M^2
EST. GFR  (NON AFRICAN AMERICAN): >60 ML/MIN/1.73 M^2
GLUCOSE SERPL-MCNC: 93 MG/DL (ref 70–110)
HCT VFR BLD AUTO: 40.9 % (ref 37–48.5)
HGB BLD-MCNC: 12.9 G/DL (ref 12–16)
IMM GRANULOCYTES # BLD AUTO: 0.01 K/UL (ref 0–0.04)
IMM GRANULOCYTES NFR BLD AUTO: 0.2 % (ref 0–0.5)
LYMPHOCYTES # BLD AUTO: 1.3 K/UL (ref 1–4.8)
LYMPHOCYTES NFR BLD: 24.2 % (ref 18–48)
MCH RBC QN AUTO: 29.4 PG (ref 27–31)
MCHC RBC AUTO-ENTMCNC: 31.5 G/DL (ref 32–36)
MCV RBC AUTO: 93 FL (ref 82–98)
MONOCYTES # BLD AUTO: 0.6 K/UL (ref 0.3–1)
MONOCYTES NFR BLD: 11.2 % (ref 4–15)
NEUTROPHILS # BLD AUTO: 3.2 K/UL (ref 1.8–7.7)
NEUTROPHILS NFR BLD: 58.9 % (ref 38–73)
NRBC BLD-RTO: 0 /100 WBC
PLATELET # BLD AUTO: 217 K/UL (ref 150–350)
PMV BLD AUTO: 11.9 FL (ref 9.2–12.9)
POTASSIUM SERPL-SCNC: 3.7 MMOL/L (ref 3.5–5.1)
PROT SERPL-MCNC: 7.2 G/DL (ref 6–8.4)
RBC # BLD AUTO: 4.39 M/UL (ref 4–5.4)
SODIUM SERPL-SCNC: 144 MMOL/L (ref 136–145)
WBC # BLD AUTO: 5.46 K/UL (ref 3.9–12.7)

## 2021-03-05 PROCEDURE — 85652 RBC SED RATE AUTOMATED: CPT | Performed by: INTERNAL MEDICINE

## 2021-03-05 PROCEDURE — 86481 TB AG RESPONSE T-CELL SUSP: CPT | Performed by: INTERNAL MEDICINE

## 2021-03-05 PROCEDURE — 80053 COMPREHEN METABOLIC PANEL: CPT | Performed by: INTERNAL MEDICINE

## 2021-03-05 PROCEDURE — 85025 COMPLETE CBC W/AUTO DIFF WBC: CPT | Performed by: INTERNAL MEDICINE

## 2021-03-05 PROCEDURE — 36415 COLL VENOUS BLD VENIPUNCTURE: CPT | Performed by: INTERNAL MEDICINE

## 2021-03-05 PROCEDURE — 86140 C-REACTIVE PROTEIN: CPT | Performed by: INTERNAL MEDICINE

## 2021-03-09 ENCOUNTER — TELEPHONE (OUTPATIENT)
Dept: RHEUMATOLOGY | Facility: CLINIC | Age: 76
End: 2021-03-09

## 2021-03-09 DIAGNOSIS — R76.12 POSITIVE QUANTIFERON-TB GOLD TEST: Primary | ICD-10-CM

## 2021-03-16 ENCOUNTER — PATIENT OUTREACH (OUTPATIENT)
Dept: ADMINISTRATIVE | Facility: OTHER | Age: 76
End: 2021-03-16

## 2021-03-17 ENCOUNTER — OFFICE VISIT (OUTPATIENT)
Dept: RHEUMATOLOGY | Facility: CLINIC | Age: 76
End: 2021-03-17
Payer: MEDICARE

## 2021-03-17 ENCOUNTER — LAB VISIT (OUTPATIENT)
Dept: LAB | Facility: HOSPITAL | Age: 76
End: 2021-03-17
Attending: INTERNAL MEDICINE
Payer: MEDICARE

## 2021-03-17 VITALS
WEIGHT: 167.56 LBS | DIASTOLIC BLOOD PRESSURE: 83 MMHG | HEART RATE: 86 BPM | SYSTOLIC BLOOD PRESSURE: 130 MMHG | BODY MASS INDEX: 27.88 KG/M2

## 2021-03-17 DIAGNOSIS — M35.01 SJOGREN'S SYNDROME WITH KERATOCONJUNCTIVITIS SICCA: ICD-10-CM

## 2021-03-17 DIAGNOSIS — R76.12 POSITIVE QUANTIFERON-TB GOLD TEST: ICD-10-CM

## 2021-03-17 DIAGNOSIS — M15.9 PRIMARY OSTEOARTHRITIS INVOLVING MULTIPLE JOINTS: ICD-10-CM

## 2021-03-17 DIAGNOSIS — Z79.899 LONG-TERM CURRENT USE OF HIGH RISK MEDICATION OTHER THAN ANTICOAGULANT: ICD-10-CM

## 2021-03-17 DIAGNOSIS — M05.79 RHEUMATOID ARTHRITIS INVOLVING MULTIPLE SITES WITH POSITIVE RHEUMATOID FACTOR: Primary | ICD-10-CM

## 2021-03-17 PROCEDURE — 1125F AMNT PAIN NOTED PAIN PRSNT: CPT | Mod: S$GLB,,, | Performed by: INTERNAL MEDICINE

## 2021-03-17 PROCEDURE — 1159F MED LIST DOCD IN RCRD: CPT | Mod: S$GLB,,, | Performed by: INTERNAL MEDICINE

## 2021-03-17 PROCEDURE — 1157F PR ADVANCE CARE PLAN OR EQUIV PRESENT IN MEDICAL RECORD: ICD-10-PCS | Mod: S$GLB,,, | Performed by: INTERNAL MEDICINE

## 2021-03-17 PROCEDURE — 99999 PR PBB SHADOW E&M-EST. PATIENT-LVL III: ICD-10-PCS | Mod: PBBFAC,,, | Performed by: INTERNAL MEDICINE

## 2021-03-17 PROCEDURE — 86481 TB AG RESPONSE T-CELL SUSP: CPT | Performed by: INTERNAL MEDICINE

## 2021-03-17 PROCEDURE — 99213 OFFICE O/P EST LOW 20 MIN: CPT | Mod: S$GLB,,, | Performed by: INTERNAL MEDICINE

## 2021-03-17 PROCEDURE — 1159F PR MEDICATION LIST DOCUMENTED IN MEDICAL RECORD: ICD-10-PCS | Mod: S$GLB,,, | Performed by: INTERNAL MEDICINE

## 2021-03-17 PROCEDURE — 99213 PR OFFICE/OUTPT VISIT, EST, LEVL III, 20-29 MIN: ICD-10-PCS | Mod: S$GLB,,, | Performed by: INTERNAL MEDICINE

## 2021-03-17 PROCEDURE — 3075F SYST BP GE 130 - 139MM HG: CPT | Mod: CPTII,S$GLB,, | Performed by: INTERNAL MEDICINE

## 2021-03-17 PROCEDURE — 99999 PR PBB SHADOW E&M-EST. PATIENT-LVL III: CPT | Mod: PBBFAC,,, | Performed by: INTERNAL MEDICINE

## 2021-03-17 PROCEDURE — 36415 COLL VENOUS BLD VENIPUNCTURE: CPT | Performed by: INTERNAL MEDICINE

## 2021-03-17 PROCEDURE — 3008F BODY MASS INDEX DOCD: CPT | Mod: CPTII,S$GLB,, | Performed by: INTERNAL MEDICINE

## 2021-03-17 PROCEDURE — 3075F PR MOST RECENT SYSTOLIC BLOOD PRESS GE 130-139MM HG: ICD-10-PCS | Mod: CPTII,S$GLB,, | Performed by: INTERNAL MEDICINE

## 2021-03-17 PROCEDURE — 3079F PR MOST RECENT DIASTOLIC BLOOD PRESSURE 80-89 MM HG: ICD-10-PCS | Mod: CPTII,S$GLB,, | Performed by: INTERNAL MEDICINE

## 2021-03-17 PROCEDURE — 1157F ADVNC CARE PLAN IN RCRD: CPT | Mod: S$GLB,,, | Performed by: INTERNAL MEDICINE

## 2021-03-17 PROCEDURE — 1125F PR PAIN SEVERITY QUANTIFIED, PAIN PRESENT: ICD-10-PCS | Mod: S$GLB,,, | Performed by: INTERNAL MEDICINE

## 2021-03-17 PROCEDURE — 3079F DIAST BP 80-89 MM HG: CPT | Mod: CPTII,S$GLB,, | Performed by: INTERNAL MEDICINE

## 2021-03-17 PROCEDURE — 3008F PR BODY MASS INDEX (BMI) DOCUMENTED: ICD-10-PCS | Mod: CPTII,S$GLB,, | Performed by: INTERNAL MEDICINE

## 2021-03-17 ASSESSMENT — ROUTINE ASSESSMENT OF PATIENT INDEX DATA (RAPID3)
FATIGUE SCORE: 0.5
MDHAQ FUNCTION SCORE: 0.6
TOTAL RAPID3 SCORE: 1.17
PATIENT GLOBAL ASSESSMENT SCORE: 1
PSYCHOLOGICAL DISTRESS SCORE: 0
AM STIFFNESS SCORE: 0, NO
PAIN SCORE: 0.5

## 2021-03-18 ENCOUNTER — PATIENT MESSAGE (OUTPATIENT)
Dept: RESEARCH | Facility: HOSPITAL | Age: 76
End: 2021-03-18

## 2021-03-20 ENCOUNTER — PATIENT MESSAGE (OUTPATIENT)
Dept: ENDOSCOPY | Facility: HOSPITAL | Age: 76
End: 2021-03-20

## 2021-03-20 LAB — M TB IFN-G BLD-IMP: NEGATIVE

## 2021-03-24 ENCOUNTER — OFFICE VISIT (OUTPATIENT)
Dept: INTERNAL MEDICINE | Facility: CLINIC | Age: 76
End: 2021-03-24
Payer: MEDICARE

## 2021-03-24 VITALS
DIASTOLIC BLOOD PRESSURE: 86 MMHG | BODY MASS INDEX: 27.18 KG/M2 | HEART RATE: 74 BPM | SYSTOLIC BLOOD PRESSURE: 132 MMHG | HEIGHT: 65 IN | WEIGHT: 163.13 LBS

## 2021-03-24 DIAGNOSIS — Z80.0 FAMILY HISTORY OF COLON CANCER: ICD-10-CM

## 2021-03-24 DIAGNOSIS — M85.80 OSTEOPENIA DETERMINED BY X-RAY: ICD-10-CM

## 2021-03-24 DIAGNOSIS — M05.79 RHEUMATOID ARTHRITIS INVOLVING MULTIPLE SITES WITH POSITIVE RHEUMATOID FACTOR: ICD-10-CM

## 2021-03-24 DIAGNOSIS — M15.9 PRIMARY OSTEOARTHRITIS INVOLVING MULTIPLE JOINTS: Primary | ICD-10-CM

## 2021-03-24 DIAGNOSIS — I70.0 AORTIC ATHEROSCLEROSIS: ICD-10-CM

## 2021-03-24 DIAGNOSIS — M35.01 SJOGREN'S SYNDROME WITH KERATOCONJUNCTIVITIS SICCA: ICD-10-CM

## 2021-03-24 DIAGNOSIS — K21.9 GASTROESOPHAGEAL REFLUX DISEASE WITHOUT ESOPHAGITIS: ICD-10-CM

## 2021-03-24 PROCEDURE — 3288F PR FALLS RISK ASSESSMENT DOCUMENTED: ICD-10-PCS | Mod: CPTII,S$GLB,, | Performed by: INTERNAL MEDICINE

## 2021-03-24 PROCEDURE — 3079F DIAST BP 80-89 MM HG: CPT | Mod: CPTII,S$GLB,, | Performed by: INTERNAL MEDICINE

## 2021-03-24 PROCEDURE — 3008F BODY MASS INDEX DOCD: CPT | Mod: CPTII,S$GLB,, | Performed by: INTERNAL MEDICINE

## 2021-03-24 PROCEDURE — 3079F PR MOST RECENT DIASTOLIC BLOOD PRESSURE 80-89 MM HG: ICD-10-PCS | Mod: CPTII,S$GLB,, | Performed by: INTERNAL MEDICINE

## 2021-03-24 PROCEDURE — 99999 PR PBB SHADOW E&M-EST. PATIENT-LVL IV: CPT | Mod: PBBFAC,,, | Performed by: INTERNAL MEDICINE

## 2021-03-24 PROCEDURE — 99214 PR OFFICE/OUTPT VISIT, EST, LEVL IV, 30-39 MIN: ICD-10-PCS | Mod: S$GLB,,, | Performed by: INTERNAL MEDICINE

## 2021-03-24 PROCEDURE — 1157F ADVNC CARE PLAN IN RCRD: CPT | Mod: S$GLB,,, | Performed by: INTERNAL MEDICINE

## 2021-03-24 PROCEDURE — 1126F PR PAIN SEVERITY QUANTIFIED, NO PAIN PRESENT: ICD-10-PCS | Mod: S$GLB,,, | Performed by: INTERNAL MEDICINE

## 2021-03-24 PROCEDURE — 1157F PR ADVANCE CARE PLAN OR EQUIV PRESENT IN MEDICAL RECORD: ICD-10-PCS | Mod: S$GLB,,, | Performed by: INTERNAL MEDICINE

## 2021-03-24 PROCEDURE — 99214 OFFICE O/P EST MOD 30 MIN: CPT | Mod: S$GLB,,, | Performed by: INTERNAL MEDICINE

## 2021-03-24 PROCEDURE — 3288F FALL RISK ASSESSMENT DOCD: CPT | Mod: CPTII,S$GLB,, | Performed by: INTERNAL MEDICINE

## 2021-03-24 PROCEDURE — 99499 RISK ADDL DX/OHS AUDIT: ICD-10-PCS | Mod: HCNC,S$GLB,, | Performed by: INTERNAL MEDICINE

## 2021-03-24 PROCEDURE — 1159F PR MEDICATION LIST DOCUMENTED IN MEDICAL RECORD: ICD-10-PCS | Mod: S$GLB,,, | Performed by: INTERNAL MEDICINE

## 2021-03-24 PROCEDURE — 1101F PR PT FALLS ASSESS DOC 0-1 FALLS W/OUT INJ PAST YR: ICD-10-PCS | Mod: CPTII,S$GLB,, | Performed by: INTERNAL MEDICINE

## 2021-03-24 PROCEDURE — 1126F AMNT PAIN NOTED NONE PRSNT: CPT | Mod: S$GLB,,, | Performed by: INTERNAL MEDICINE

## 2021-03-24 PROCEDURE — 1101F PT FALLS ASSESS-DOCD LE1/YR: CPT | Mod: CPTII,S$GLB,, | Performed by: INTERNAL MEDICINE

## 2021-03-24 PROCEDURE — 99999 PR PBB SHADOW E&M-EST. PATIENT-LVL IV: ICD-10-PCS | Mod: PBBFAC,,, | Performed by: INTERNAL MEDICINE

## 2021-03-24 PROCEDURE — 99499 UNLISTED E&M SERVICE: CPT | Mod: HCNC,S$GLB,, | Performed by: INTERNAL MEDICINE

## 2021-03-24 PROCEDURE — 3075F SYST BP GE 130 - 139MM HG: CPT | Mod: CPTII,S$GLB,, | Performed by: INTERNAL MEDICINE

## 2021-03-24 PROCEDURE — 3008F PR BODY MASS INDEX (BMI) DOCUMENTED: ICD-10-PCS | Mod: CPTII,S$GLB,, | Performed by: INTERNAL MEDICINE

## 2021-03-24 PROCEDURE — 1159F MED LIST DOCD IN RCRD: CPT | Mod: S$GLB,,, | Performed by: INTERNAL MEDICINE

## 2021-03-24 PROCEDURE — 3075F PR MOST RECENT SYSTOLIC BLOOD PRESS GE 130-139MM HG: ICD-10-PCS | Mod: CPTII,S$GLB,, | Performed by: INTERNAL MEDICINE

## 2021-03-25 ENCOUNTER — PATIENT MESSAGE (OUTPATIENT)
Dept: ENDOSCOPY | Facility: HOSPITAL | Age: 76
End: 2021-03-25

## 2021-03-25 DIAGNOSIS — Z12.11 COLON CANCER SCREENING: Primary | ICD-10-CM

## 2021-03-25 DIAGNOSIS — Z01.818 PRE-OP TESTING: Primary | ICD-10-CM

## 2021-03-25 RX ORDER — SODIUM, POTASSIUM,MAG SULFATES 17.5-3.13G
1 SOLUTION, RECONSTITUTED, ORAL ORAL DAILY
Qty: 1 KIT | Refills: 0 | Status: SHIPPED | OUTPATIENT
Start: 2021-03-25 | End: 2021-03-27

## 2021-03-26 ENCOUNTER — PATIENT MESSAGE (OUTPATIENT)
Dept: RESEARCH | Facility: HOSPITAL | Age: 76
End: 2021-03-26

## 2021-04-22 ENCOUNTER — PES CALL (OUTPATIENT)
Dept: ADMINISTRATIVE | Facility: CLINIC | Age: 76
End: 2021-04-22

## 2021-04-23 ENCOUNTER — LAB VISIT (OUTPATIENT)
Dept: INTERNAL MEDICINE | Facility: CLINIC | Age: 76
End: 2021-04-23
Payer: MEDICARE

## 2021-04-23 DIAGNOSIS — Z01.818 PRE-OP TESTING: ICD-10-CM

## 2021-04-23 PROCEDURE — U0003 INFECTIOUS AGENT DETECTION BY NUCLEIC ACID (DNA OR RNA); SEVERE ACUTE RESPIRATORY SYNDROME CORONAVIRUS 2 (SARS-COV-2) (CORONAVIRUS DISEASE [COVID-19]), AMPLIFIED PROBE TECHNIQUE, MAKING USE OF HIGH THROUGHPUT TECHNOLOGIES AS DESCRIBED BY CMS-2020-01-R: HCPCS | Mod: HCNC | Performed by: CLINICAL NURSE SPECIALIST

## 2021-04-23 PROCEDURE — U0005 INFEC AGEN DETEC AMPLI PROBE: HCPCS | Performed by: CLINICAL NURSE SPECIALIST

## 2021-04-24 LAB — SARS-COV-2 RNA RESP QL NAA+PROBE: NOT DETECTED

## 2021-04-25 ENCOUNTER — ANESTHESIA EVENT (OUTPATIENT)
Dept: ENDOSCOPY | Facility: HOSPITAL | Age: 76
End: 2021-04-25
Payer: MEDICARE

## 2021-04-26 ENCOUNTER — TELEPHONE (OUTPATIENT)
Dept: SURGERY | Facility: CLINIC | Age: 76
End: 2021-04-26

## 2021-04-26 ENCOUNTER — ANESTHESIA (OUTPATIENT)
Dept: ENDOSCOPY | Facility: HOSPITAL | Age: 76
End: 2021-04-26
Payer: MEDICARE

## 2021-04-26 ENCOUNTER — HOSPITAL ENCOUNTER (OUTPATIENT)
Facility: HOSPITAL | Age: 76
Discharge: HOME | End: 2021-04-26
Attending: COLON & RECTAL SURGERY | Admitting: COLON & RECTAL SURGERY
Payer: MEDICARE

## 2021-04-26 VITALS
TEMPERATURE: 98 F | HEIGHT: 65 IN | DIASTOLIC BLOOD PRESSURE: 64 MMHG | BODY MASS INDEX: 27.99 KG/M2 | RESPIRATION RATE: 16 BRPM | OXYGEN SATURATION: 99 % | WEIGHT: 168 LBS | SYSTOLIC BLOOD PRESSURE: 136 MMHG | HEART RATE: 70 BPM

## 2021-04-26 DIAGNOSIS — Z80.0 FAMILY HISTORY OF COLON CANCER: Primary | ICD-10-CM

## 2021-04-26 DIAGNOSIS — R93.3 ABNORMAL COLONOSCOPY: Primary | ICD-10-CM

## 2021-04-26 PROBLEM — Z12.11 SCREENING FOR MALIGNANT NEOPLASM OF COLON: Status: ACTIVE | Noted: 2021-04-26

## 2021-04-26 PROCEDURE — 63600175 PHARM REV CODE 636 W HCPCS: Mod: HCNC | Performed by: NURSE ANESTHETIST, CERTIFIED REGISTERED

## 2021-04-26 PROCEDURE — G0105 COLORECTAL SCRN; HI RISK IND: ICD-10-PCS | Mod: HCNC,,, | Performed by: COLON & RECTAL SURGERY

## 2021-04-26 PROCEDURE — E9220 PRA ENDO ANESTHESIA: HCPCS | Mod: HCNC,,, | Performed by: NURSE ANESTHETIST, CERTIFIED REGISTERED

## 2021-04-26 PROCEDURE — 37000008 HC ANESTHESIA 1ST 15 MINUTES: Mod: HCNC | Performed by: COLON & RECTAL SURGERY

## 2021-04-26 PROCEDURE — 25000003 PHARM REV CODE 250: Mod: HCNC | Performed by: NURSE ANESTHETIST, CERTIFIED REGISTERED

## 2021-04-26 PROCEDURE — G0105 COLORECTAL SCRN; HI RISK IND: HCPCS | Mod: HCNC | Performed by: COLON & RECTAL SURGERY

## 2021-04-26 PROCEDURE — 37000009 HC ANESTHESIA EA ADD 15 MINS: Mod: HCNC | Performed by: COLON & RECTAL SURGERY

## 2021-04-26 PROCEDURE — G0105 COLORECTAL SCRN; HI RISK IND: HCPCS | Mod: HCNC,,, | Performed by: COLON & RECTAL SURGERY

## 2021-04-26 PROCEDURE — E9220 PRA ENDO ANESTHESIA: ICD-10-PCS | Mod: HCNC,,, | Performed by: NURSE ANESTHETIST, CERTIFIED REGISTERED

## 2021-04-26 PROCEDURE — 25000003 PHARM REV CODE 250: Mod: HCNC | Performed by: COLON & RECTAL SURGERY

## 2021-04-26 RX ORDER — PROPOFOL 10 MG/ML
VIAL (ML) INTRAVENOUS CONTINUOUS PRN
Status: DISCONTINUED | OUTPATIENT
Start: 2021-04-26 | End: 2021-04-26

## 2021-04-26 RX ORDER — LIDOCAINE HYDROCHLORIDE 20 MG/ML
INJECTION INTRAVENOUS
Status: DISCONTINUED | OUTPATIENT
Start: 2021-04-26 | End: 2021-04-26

## 2021-04-26 RX ORDER — SODIUM CHLORIDE 9 MG/ML
INJECTION, SOLUTION INTRAVENOUS CONTINUOUS
Status: DISCONTINUED | OUTPATIENT
Start: 2021-04-26 | End: 2021-04-26 | Stop reason: HOSPADM

## 2021-04-26 RX ORDER — PROPOFOL 10 MG/ML
VIAL (ML) INTRAVENOUS
Status: DISCONTINUED | OUTPATIENT
Start: 2021-04-26 | End: 2021-04-26

## 2021-04-26 RX ADMIN — PROPOFOL 175 MCG/KG/MIN: 10 INJECTION, EMULSION INTRAVENOUS at 10:04

## 2021-04-26 RX ADMIN — SODIUM CHLORIDE: 0.9 INJECTION, SOLUTION INTRAVENOUS at 10:04

## 2021-04-26 RX ADMIN — LIDOCAINE HYDROCHLORIDE 30 MG: 20 INJECTION, SOLUTION INTRAVENOUS at 10:04

## 2021-04-26 RX ADMIN — SODIUM CHLORIDE: 0.9 INJECTION, SOLUTION INTRAVENOUS at 09:04

## 2021-04-26 RX ADMIN — PROPOFOL 30 MG: 10 INJECTION, EMULSION INTRAVENOUS at 10:04

## 2021-04-29 ENCOUNTER — PATIENT MESSAGE (OUTPATIENT)
Dept: INTERNAL MEDICINE | Facility: CLINIC | Age: 76
End: 2021-04-29

## 2021-04-29 ENCOUNTER — HOSPITAL ENCOUNTER (OUTPATIENT)
Dept: RADIOLOGY | Facility: HOSPITAL | Age: 76
Discharge: HOME | End: 2021-04-29
Attending: COLON & RECTAL SURGERY
Payer: MEDICARE

## 2021-04-29 DIAGNOSIS — R93.3 ABNORMAL COLONOSCOPY: ICD-10-CM

## 2021-04-29 PROCEDURE — 74176 CT ABD & PELVIS W/O CONTRAST: CPT | Mod: 26,HCNC,, | Performed by: RADIOLOGY

## 2021-04-29 PROCEDURE — 74176 CT ABDOMEN PELVIS WITHOUT CONTRAST: ICD-10-PCS | Mod: 26,HCNC,, | Performed by: RADIOLOGY

## 2021-04-29 PROCEDURE — 25500020 PHARM REV CODE 255: Mod: HCNC | Performed by: COLON & RECTAL SURGERY

## 2021-04-29 PROCEDURE — 74176 CT ABD & PELVIS W/O CONTRAST: CPT | Mod: TC,HCNC

## 2021-04-29 RX ADMIN — IOHEXOL 15 ML: 350 INJECTION, SOLUTION INTRAVENOUS at 12:04

## 2021-04-29 RX ADMIN — IOHEXOL 15 ML: 350 INJECTION, SOLUTION INTRAVENOUS at 01:04

## 2021-05-03 ENCOUNTER — PATIENT MESSAGE (OUTPATIENT)
Dept: SURGERY | Facility: CLINIC | Age: 76
End: 2021-05-03

## 2021-05-11 ENCOUNTER — PATIENT MESSAGE (OUTPATIENT)
Dept: INTERNAL MEDICINE | Facility: CLINIC | Age: 76
End: 2021-05-11

## 2021-05-11 DIAGNOSIS — N13.30 HYDRONEPHROSIS OF LEFT KIDNEY: ICD-10-CM

## 2021-05-11 DIAGNOSIS — R93.5 ABNORMAL CT OF THE ABDOMEN: ICD-10-CM

## 2021-05-11 DIAGNOSIS — N13.30 HYDRONEPHROSIS, UNSPECIFIED HYDRONEPHROSIS TYPE: ICD-10-CM

## 2021-05-11 DIAGNOSIS — Z87.442 HISTORY OF NEPHROLITHIASIS: Primary | ICD-10-CM

## 2021-05-13 ENCOUNTER — TELEPHONE (OUTPATIENT)
Dept: SURGERY | Facility: CLINIC | Age: 76
End: 2021-05-13

## 2021-05-14 ENCOUNTER — TELEPHONE (OUTPATIENT)
Dept: INTERNAL MEDICINE | Facility: CLINIC | Age: 76
End: 2021-05-14

## 2021-05-17 ENCOUNTER — PATIENT MESSAGE (OUTPATIENT)
Dept: INTERNAL MEDICINE | Facility: CLINIC | Age: 76
End: 2021-05-17

## 2021-05-18 ENCOUNTER — LAB VISIT (OUTPATIENT)
Dept: LAB | Facility: HOSPITAL | Age: 76
End: 2021-05-18
Attending: INTERNAL MEDICINE
Payer: MEDICARE

## 2021-05-18 ENCOUNTER — PATIENT MESSAGE (OUTPATIENT)
Dept: INTERNAL MEDICINE | Facility: CLINIC | Age: 76
End: 2021-05-18

## 2021-05-18 DIAGNOSIS — N13.30 HYDRONEPHROSIS OF LEFT KIDNEY: ICD-10-CM

## 2021-05-18 LAB
CREAT SERPL-MCNC: 0.8 MG/DL (ref 0.5–1.4)
EST. GFR  (AFRICAN AMERICAN): >60 ML/MIN/1.73 M^2
EST. GFR  (NON AFRICAN AMERICAN): >60 ML/MIN/1.73 M^2

## 2021-05-18 PROCEDURE — 36415 COLL VENOUS BLD VENIPUNCTURE: CPT | Mod: HCNC | Performed by: INTERNAL MEDICINE

## 2021-05-18 PROCEDURE — 82565 ASSAY OF CREATININE: CPT | Mod: HCNC | Performed by: INTERNAL MEDICINE

## 2021-05-28 ENCOUNTER — HOSPITAL ENCOUNTER (OUTPATIENT)
Dept: RADIOLOGY | Facility: HOSPITAL | Age: 76
Discharge: HOME | End: 2021-05-28
Attending: INTERNAL MEDICINE
Payer: MEDICARE

## 2021-05-28 ENCOUNTER — PATIENT MESSAGE (OUTPATIENT)
Dept: INTERNAL MEDICINE | Facility: CLINIC | Age: 76
End: 2021-05-28

## 2021-05-28 DIAGNOSIS — R93.5 ABNORMAL CT OF THE ABDOMEN: ICD-10-CM

## 2021-05-28 DIAGNOSIS — N13.30 HYDRONEPHROSIS OF LEFT KIDNEY: ICD-10-CM

## 2021-05-28 PROCEDURE — 74178 CT ABD&PLV WO CNTR FLWD CNTR: CPT | Mod: 26,HCNC,, | Performed by: RADIOLOGY

## 2021-05-28 PROCEDURE — 74178 CT UROGRAM ABD PELVIS W WO: ICD-10-PCS | Mod: 26,HCNC,, | Performed by: RADIOLOGY

## 2021-05-28 PROCEDURE — 25500020 PHARM REV CODE 255: Mod: HCNC | Performed by: INTERNAL MEDICINE

## 2021-05-28 PROCEDURE — 74178 CT ABD&PLV WO CNTR FLWD CNTR: CPT | Mod: TC,HCNC

## 2021-05-28 RX ADMIN — IOHEXOL 120 ML: 350 INJECTION, SOLUTION INTRAVENOUS at 10:05

## 2021-05-31 ENCOUNTER — PATIENT MESSAGE (OUTPATIENT)
Dept: INTERNAL MEDICINE | Facility: CLINIC | Age: 76
End: 2021-05-31

## 2021-06-11 ENCOUNTER — PATIENT MESSAGE (OUTPATIENT)
Dept: RHEUMATOLOGY | Facility: CLINIC | Age: 76
End: 2021-06-11

## 2021-06-23 ENCOUNTER — OFFICE VISIT (OUTPATIENT)
Dept: UROLOGY | Facility: CLINIC | Age: 76
End: 2021-06-23
Payer: MEDICARE

## 2021-06-23 VITALS
HEIGHT: 65 IN | WEIGHT: 160.25 LBS | BODY MASS INDEX: 26.7 KG/M2 | HEART RATE: 84 BPM | DIASTOLIC BLOOD PRESSURE: 82 MMHG | SYSTOLIC BLOOD PRESSURE: 145 MMHG

## 2021-06-23 DIAGNOSIS — R93.5 ABNORMAL CT OF THE ABDOMEN: ICD-10-CM

## 2021-06-23 DIAGNOSIS — Z87.442 HISTORY OF NEPHROLITHIASIS: ICD-10-CM

## 2021-06-23 DIAGNOSIS — N13.30 HYDRONEPHROSIS OF LEFT KIDNEY: ICD-10-CM

## 2021-06-23 PROCEDURE — 1159F PR MEDICATION LIST DOCUMENTED IN MEDICAL RECORD: ICD-10-PCS | Mod: HCNC,S$GLB,, | Performed by: UROLOGY

## 2021-06-23 PROCEDURE — 1101F PR PT FALLS ASSESS DOC 0-1 FALLS W/OUT INJ PAST YR: ICD-10-PCS | Mod: HCNC,CPTII,S$GLB, | Performed by: UROLOGY

## 2021-06-23 PROCEDURE — 99999 PR PBB SHADOW E&M-EST. PATIENT-LVL IV: ICD-10-PCS | Mod: PBBFAC,HCNC,, | Performed by: UROLOGY

## 2021-06-23 PROCEDURE — 3288F FALL RISK ASSESSMENT DOCD: CPT | Mod: HCNC,CPTII,S$GLB, | Performed by: UROLOGY

## 2021-06-23 PROCEDURE — 3008F BODY MASS INDEX DOCD: CPT | Mod: HCNC,CPTII,S$GLB, | Performed by: UROLOGY

## 2021-06-23 PROCEDURE — 3288F PR FALLS RISK ASSESSMENT DOCUMENTED: ICD-10-PCS | Mod: HCNC,CPTII,S$GLB, | Performed by: UROLOGY

## 2021-06-23 PROCEDURE — 99203 PR OFFICE/OUTPT VISIT, NEW, LEVL III, 30-44 MIN: ICD-10-PCS | Mod: HCNC,S$GLB,, | Performed by: UROLOGY

## 2021-06-23 PROCEDURE — 1126F AMNT PAIN NOTED NONE PRSNT: CPT | Mod: HCNC,S$GLB,, | Performed by: UROLOGY

## 2021-06-23 PROCEDURE — 1159F MED LIST DOCD IN RCRD: CPT | Mod: HCNC,S$GLB,, | Performed by: UROLOGY

## 2021-06-23 PROCEDURE — 99203 OFFICE O/P NEW LOW 30 MIN: CPT | Mod: HCNC,S$GLB,, | Performed by: UROLOGY

## 2021-06-23 PROCEDURE — 1157F PR ADVANCE CARE PLAN OR EQUIV PRESENT IN MEDICAL RECORD: ICD-10-PCS | Mod: HCNC,S$GLB,, | Performed by: UROLOGY

## 2021-06-23 PROCEDURE — 1101F PT FALLS ASSESS-DOCD LE1/YR: CPT | Mod: HCNC,CPTII,S$GLB, | Performed by: UROLOGY

## 2021-06-23 PROCEDURE — 3008F PR BODY MASS INDEX (BMI) DOCUMENTED: ICD-10-PCS | Mod: HCNC,CPTII,S$GLB, | Performed by: UROLOGY

## 2021-06-23 PROCEDURE — 1157F ADVNC CARE PLAN IN RCRD: CPT | Mod: HCNC,S$GLB,, | Performed by: UROLOGY

## 2021-06-23 PROCEDURE — 1126F PR PAIN SEVERITY QUANTIFIED, NO PAIN PRESENT: ICD-10-PCS | Mod: HCNC,S$GLB,, | Performed by: UROLOGY

## 2021-06-23 PROCEDURE — 99999 PR PBB SHADOW E&M-EST. PATIENT-LVL IV: CPT | Mod: PBBFAC,HCNC,, | Performed by: UROLOGY

## 2021-06-26 DIAGNOSIS — M85.80 OSTEOPENIA DETERMINED BY X-RAY: ICD-10-CM

## 2021-06-26 DIAGNOSIS — Z91.89 AT HIGH RISK FOR OSTEOPOROSIS: ICD-10-CM

## 2021-06-27 RX ORDER — ALENDRONATE SODIUM 70 MG/1
70 TABLET ORAL
Qty: 12 TABLET | Refills: 0 | Status: SHIPPED | OUTPATIENT
Start: 2021-06-27 | End: 2021-09-21 | Stop reason: SDUPTHER

## 2021-07-14 ENCOUNTER — LAB VISIT (OUTPATIENT)
Dept: LAB | Facility: HOSPITAL | Age: 76
End: 2021-07-14
Attending: INTERNAL MEDICINE
Payer: MEDICARE

## 2021-07-14 DIAGNOSIS — Z79.899 LONG-TERM CURRENT USE OF HIGH RISK MEDICATION OTHER THAN ANTICOAGULANT: ICD-10-CM

## 2021-07-14 DIAGNOSIS — M05.79 RHEUMATOID ARTHRITIS INVOLVING MULTIPLE SITES WITH POSITIVE RHEUMATOID FACTOR: ICD-10-CM

## 2021-07-14 LAB
ALBUMIN SERPL BCP-MCNC: 3.6 G/DL (ref 3.5–5.2)
ALP SERPL-CCNC: 61 U/L (ref 55–135)
ALT SERPL W/O P-5'-P-CCNC: 12 U/L (ref 10–44)
ANION GAP SERPL CALC-SCNC: 8 MMOL/L (ref 8–16)
AST SERPL-CCNC: 22 U/L (ref 10–40)
BASOPHILS # BLD AUTO: 0.13 K/UL (ref 0–0.2)
BASOPHILS NFR BLD: 2.7 % (ref 0–1.9)
BILIRUB SERPL-MCNC: 0.9 MG/DL (ref 0.1–1)
BUN SERPL-MCNC: 11 MG/DL (ref 8–23)
CALCIUM SERPL-MCNC: 9.5 MG/DL (ref 8.7–10.5)
CHLORIDE SERPL-SCNC: 108 MMOL/L (ref 95–110)
CO2 SERPL-SCNC: 27 MMOL/L (ref 23–29)
CREAT SERPL-MCNC: 0.8 MG/DL (ref 0.5–1.4)
CRP SERPL-MCNC: 2 MG/L (ref 0–8.2)
DIFFERENTIAL METHOD: ABNORMAL
EOSINOPHIL # BLD AUTO: 0.1 K/UL (ref 0–0.5)
EOSINOPHIL NFR BLD: 1.3 % (ref 0–8)
ERYTHROCYTE [DISTWIDTH] IN BLOOD BY AUTOMATED COUNT: 12.4 % (ref 11.5–14.5)
ERYTHROCYTE [SEDIMENTATION RATE] IN BLOOD BY WESTERGREN METHOD: 31 MM/HR (ref 0–36)
EST. GFR  (AFRICAN AMERICAN): >60 ML/MIN/1.73 M^2
EST. GFR  (NON AFRICAN AMERICAN): >60 ML/MIN/1.73 M^2
GLUCOSE SERPL-MCNC: 85 MG/DL (ref 70–110)
HCT VFR BLD AUTO: 36.9 % (ref 37–48.5)
HGB BLD-MCNC: 12.1 G/DL (ref 12–16)
IMM GRANULOCYTES # BLD AUTO: 0.01 K/UL (ref 0–0.04)
IMM GRANULOCYTES NFR BLD AUTO: 0.2 % (ref 0–0.5)
LYMPHOCYTES # BLD AUTO: 1.3 K/UL (ref 1–4.8)
LYMPHOCYTES NFR BLD: 28 % (ref 18–48)
MCH RBC QN AUTO: 29.7 PG (ref 27–31)
MCHC RBC AUTO-ENTMCNC: 32.8 G/DL (ref 32–36)
MCV RBC AUTO: 91 FL (ref 82–98)
MONOCYTES # BLD AUTO: 0.5 K/UL (ref 0.3–1)
MONOCYTES NFR BLD: 11.4 % (ref 4–15)
NEUTROPHILS # BLD AUTO: 2.7 K/UL (ref 1.8–7.7)
NEUTROPHILS NFR BLD: 56.4 % (ref 38–73)
NRBC BLD-RTO: 0 /100 WBC
PLATELET # BLD AUTO: 184 K/UL (ref 150–450)
PMV BLD AUTO: 11.3 FL (ref 9.2–12.9)
POTASSIUM SERPL-SCNC: 3.6 MMOL/L (ref 3.5–5.1)
PROT SERPL-MCNC: 7 G/DL (ref 6–8.4)
RBC # BLD AUTO: 4.07 M/UL (ref 4–5.4)
SODIUM SERPL-SCNC: 143 MMOL/L (ref 136–145)
WBC # BLD AUTO: 4.75 K/UL (ref 3.9–12.7)

## 2021-07-14 PROCEDURE — 36415 COLL VENOUS BLD VENIPUNCTURE: CPT | Mod: HCNC | Performed by: INTERNAL MEDICINE

## 2021-07-14 PROCEDURE — 86140 C-REACTIVE PROTEIN: CPT | Mod: HCNC | Performed by: INTERNAL MEDICINE

## 2021-07-14 PROCEDURE — 85025 COMPLETE CBC W/AUTO DIFF WBC: CPT | Mod: HCNC | Performed by: INTERNAL MEDICINE

## 2021-07-14 PROCEDURE — 80053 COMPREHEN METABOLIC PANEL: CPT | Mod: HCNC | Performed by: INTERNAL MEDICINE

## 2021-07-14 PROCEDURE — 85652 RBC SED RATE AUTOMATED: CPT | Mod: HCNC | Performed by: INTERNAL MEDICINE

## 2021-07-16 ENCOUNTER — OFFICE VISIT (OUTPATIENT)
Dept: RHEUMATOLOGY | Facility: CLINIC | Age: 76
End: 2021-07-16
Payer: MEDICARE

## 2021-07-16 VITALS
WEIGHT: 164.88 LBS | DIASTOLIC BLOOD PRESSURE: 75 MMHG | BODY MASS INDEX: 27.47 KG/M2 | SYSTOLIC BLOOD PRESSURE: 163 MMHG | HEART RATE: 87 BPM | HEIGHT: 65 IN

## 2021-07-16 DIAGNOSIS — M05.79 RHEUMATOID ARTHRITIS INVOLVING MULTIPLE SITES WITH POSITIVE RHEUMATOID FACTOR: Primary | ICD-10-CM

## 2021-07-16 DIAGNOSIS — Z79.899 HIGH RISK MEDICATION USE: ICD-10-CM

## 2021-07-16 DIAGNOSIS — M15.9 PRIMARY OSTEOARTHRITIS INVOLVING MULTIPLE JOINTS: ICD-10-CM

## 2021-07-16 PROCEDURE — 1157F ADVNC CARE PLAN IN RCRD: CPT | Mod: HCNC,CPTII,S$GLB, | Performed by: INTERNAL MEDICINE

## 2021-07-16 PROCEDURE — 1157F PR ADVANCE CARE PLAN OR EQUIV PRESENT IN MEDICAL RECORD: ICD-10-PCS | Mod: HCNC,CPTII,S$GLB, | Performed by: INTERNAL MEDICINE

## 2021-07-16 PROCEDURE — 1101F PR PT FALLS ASSESS DOC 0-1 FALLS W/OUT INJ PAST YR: ICD-10-PCS | Mod: HCNC,CPTII,S$GLB, | Performed by: INTERNAL MEDICINE

## 2021-07-16 PROCEDURE — 3288F PR FALLS RISK ASSESSMENT DOCUMENTED: ICD-10-PCS | Mod: HCNC,CPTII,S$GLB, | Performed by: INTERNAL MEDICINE

## 2021-07-16 PROCEDURE — 3008F BODY MASS INDEX DOCD: CPT | Mod: HCNC,CPTII,S$GLB, | Performed by: INTERNAL MEDICINE

## 2021-07-16 PROCEDURE — 1101F PT FALLS ASSESS-DOCD LE1/YR: CPT | Mod: HCNC,CPTII,S$GLB, | Performed by: INTERNAL MEDICINE

## 2021-07-16 PROCEDURE — 1159F PR MEDICATION LIST DOCUMENTED IN MEDICAL RECORD: ICD-10-PCS | Mod: HCNC,CPTII,S$GLB, | Performed by: INTERNAL MEDICINE

## 2021-07-16 PROCEDURE — 99214 OFFICE O/P EST MOD 30 MIN: CPT | Mod: HCNC,S$GLB,, | Performed by: INTERNAL MEDICINE

## 2021-07-16 PROCEDURE — 1125F AMNT PAIN NOTED PAIN PRSNT: CPT | Mod: HCNC,CPTII,S$GLB, | Performed by: INTERNAL MEDICINE

## 2021-07-16 PROCEDURE — 1125F PR PAIN SEVERITY QUANTIFIED, PAIN PRESENT: ICD-10-PCS | Mod: HCNC,CPTII,S$GLB, | Performed by: INTERNAL MEDICINE

## 2021-07-16 PROCEDURE — 99999 PR PBB SHADOW E&M-EST. PATIENT-LVL IV: CPT | Mod: PBBFAC,HCNC,, | Performed by: INTERNAL MEDICINE

## 2021-07-16 PROCEDURE — 1159F MED LIST DOCD IN RCRD: CPT | Mod: HCNC,CPTII,S$GLB, | Performed by: INTERNAL MEDICINE

## 2021-07-16 PROCEDURE — 3288F FALL RISK ASSESSMENT DOCD: CPT | Mod: HCNC,CPTII,S$GLB, | Performed by: INTERNAL MEDICINE

## 2021-07-16 PROCEDURE — 99214 PR OFFICE/OUTPT VISIT, EST, LEVL IV, 30-39 MIN: ICD-10-PCS | Mod: HCNC,S$GLB,, | Performed by: INTERNAL MEDICINE

## 2021-07-16 PROCEDURE — 99999 PR PBB SHADOW E&M-EST. PATIENT-LVL IV: ICD-10-PCS | Mod: PBBFAC,HCNC,, | Performed by: INTERNAL MEDICINE

## 2021-07-16 PROCEDURE — 3008F PR BODY MASS INDEX (BMI) DOCUMENTED: ICD-10-PCS | Mod: HCNC,CPTII,S$GLB, | Performed by: INTERNAL MEDICINE

## 2021-07-16 RX ORDER — HYALURONATE SODIUM 30 MG/2 ML
SYRINGE (ML) INTRAARTICULAR
COMMUNITY
Start: 2021-02-12

## 2021-07-21 ENCOUNTER — CLINICAL SUPPORT (OUTPATIENT)
Dept: REHABILITATION | Facility: HOSPITAL | Age: 76
End: 2021-07-21
Payer: MEDICARE

## 2021-07-21 DIAGNOSIS — M25.60 RANGE OF MOTION DEFICIT: ICD-10-CM

## 2021-07-21 DIAGNOSIS — M15.9 PRIMARY OSTEOARTHRITIS INVOLVING MULTIPLE JOINTS: ICD-10-CM

## 2021-07-21 DIAGNOSIS — R20.1 IMPAIRED SENSATION: ICD-10-CM

## 2021-07-21 DIAGNOSIS — R29.898 REDUCED HAND STRENGTH: ICD-10-CM

## 2021-07-21 DIAGNOSIS — M79.642 PAIN IN LEFT HAND: ICD-10-CM

## 2021-07-21 DIAGNOSIS — M05.79 RHEUMATOID ARTHRITIS INVOLVING MULTIPLE SITES WITH POSITIVE RHEUMATOID FACTOR: ICD-10-CM

## 2021-07-21 PROCEDURE — 97165 OT EVAL LOW COMPLEX 30 MIN: CPT

## 2021-07-22 PROBLEM — M79.642 PAIN IN LEFT HAND: Status: ACTIVE | Noted: 2021-07-22

## 2021-07-22 PROBLEM — R29.898 REDUCED HAND STRENGTH: Status: ACTIVE | Noted: 2021-07-22

## 2021-07-22 PROBLEM — M25.60 RANGE OF MOTION DEFICIT: Status: ACTIVE | Noted: 2021-07-22

## 2021-07-22 PROBLEM — R20.1 IMPAIRED SENSATION: Status: ACTIVE | Noted: 2021-07-22

## 2021-07-30 ENCOUNTER — CLINICAL SUPPORT (OUTPATIENT)
Dept: REHABILITATION | Facility: HOSPITAL | Age: 76
End: 2021-07-30
Payer: MEDICARE

## 2021-07-30 DIAGNOSIS — M25.60 RANGE OF MOTION DEFICIT: ICD-10-CM

## 2021-07-30 DIAGNOSIS — R20.1 IMPAIRED SENSATION: ICD-10-CM

## 2021-07-30 DIAGNOSIS — M79.642 PAIN IN LEFT HAND: ICD-10-CM

## 2021-07-30 DIAGNOSIS — R29.898 REDUCED HAND STRENGTH: ICD-10-CM

## 2021-07-30 PROCEDURE — 97018 PARAFFIN BATH THERAPY: CPT

## 2021-07-30 PROCEDURE — 97110 THERAPEUTIC EXERCISES: CPT

## 2021-08-02 NOTE — PROGRESS NOTES
74 year old female with sjogren's syndrome  Seropositive RA  Raynaud's+     On plaquenil  Eye exam June 2018  On evoxac     Sicca symptoms stable  Raynaud's managed conservatively     Joints do well but had a flare nov     Left arm discomfort since nov  Now b/l arm discomfort  Nov RA flare for 3 days  All joints got better  Left shoulder pain persists  She used a brace for hand and it made the left wrist/hand pain worse     Left shoulder impinges  Pain and numbness middle finger     Types a lot     Sleeps on the sides     Takes NSAIDs,helps     Worse in the mornings     Exam :left shoulder impingement  Neck exam normal     She refused injections  NSAIDs help minimally  PT is helping  EMG/NCS pending   Left shoulder xray deg changes     CT soft splint brace  OT  If no improvement,rtc  NSAIDs vs steroids oral vs injections     Today     Will do C spine xray  Suspect C spine arthritis     Neck PT      Prn tramadol     If symptoms dont improve will consider MRI C spine vs shoulders    n/a

## 2021-08-12 ENCOUNTER — CLINICAL SUPPORT (OUTPATIENT)
Dept: REHABILITATION | Facility: HOSPITAL | Age: 76
End: 2021-08-12
Payer: MEDICARE

## 2021-08-12 DIAGNOSIS — M25.60 RANGE OF MOTION DEFICIT: Primary | ICD-10-CM

## 2021-08-12 DIAGNOSIS — R20.1 IMPAIRED SENSATION: ICD-10-CM

## 2021-08-12 DIAGNOSIS — M79.642 PAIN IN LEFT HAND: ICD-10-CM

## 2021-08-12 DIAGNOSIS — R29.898 REDUCED HAND STRENGTH: ICD-10-CM

## 2021-08-12 PROCEDURE — 97110 THERAPEUTIC EXERCISES: CPT

## 2021-08-12 PROCEDURE — 97018 PARAFFIN BATH THERAPY: CPT

## 2021-08-18 ENCOUNTER — CLINICAL SUPPORT (OUTPATIENT)
Dept: REHABILITATION | Facility: HOSPITAL | Age: 76
End: 2021-08-18
Payer: MEDICARE

## 2021-08-18 DIAGNOSIS — M79.642 PAIN IN LEFT HAND: ICD-10-CM

## 2021-08-18 DIAGNOSIS — M25.60 RANGE OF MOTION DEFICIT: Primary | ICD-10-CM

## 2021-08-18 DIAGNOSIS — R20.1 IMPAIRED SENSATION: ICD-10-CM

## 2021-08-18 DIAGNOSIS — R29.898 REDUCED HAND STRENGTH: ICD-10-CM

## 2021-08-18 PROCEDURE — 97110 THERAPEUTIC EXERCISES: CPT

## 2021-08-18 PROCEDURE — 97018 PARAFFIN BATH THERAPY: CPT

## 2021-08-25 ENCOUNTER — CLINICAL SUPPORT (OUTPATIENT)
Dept: REHABILITATION | Facility: HOSPITAL | Age: 76
End: 2021-08-25
Payer: MEDICARE

## 2021-08-25 DIAGNOSIS — M25.60 RANGE OF MOTION DEFICIT: ICD-10-CM

## 2021-08-25 DIAGNOSIS — R29.898 REDUCED HAND STRENGTH: ICD-10-CM

## 2021-08-25 DIAGNOSIS — M79.642 PAIN IN LEFT HAND: ICD-10-CM

## 2021-08-25 DIAGNOSIS — R20.1 IMPAIRED SENSATION: ICD-10-CM

## 2021-08-25 PROCEDURE — 97110 THERAPEUTIC EXERCISES: CPT

## 2021-08-25 PROCEDURE — 97018 PARAFFIN BATH THERAPY: CPT

## 2021-09-09 ENCOUNTER — TELEPHONE (OUTPATIENT)
Dept: PRIMARY CARE CLINIC | Facility: CLINIC | Age: 76
End: 2021-09-09

## 2021-09-10 ENCOUNTER — TELEPHONE (OUTPATIENT)
Dept: PRIMARY CARE CLINIC | Facility: CLINIC | Age: 76
End: 2021-09-10

## 2021-09-20 DIAGNOSIS — Z91.89 AT HIGH RISK FOR OSTEOPOROSIS: ICD-10-CM

## 2021-09-20 DIAGNOSIS — M85.80 OSTEOPENIA DETERMINED BY X-RAY: ICD-10-CM

## 2021-09-21 RX ORDER — ALENDRONATE SODIUM 70 MG/1
70 TABLET ORAL
Qty: 12 TABLET | Refills: 3 | Status: SHIPPED | OUTPATIENT
Start: 2021-09-21 | End: 2022-09-21

## 2021-09-21 NOTE — TELEPHONE ENCOUNTER
Dr. Horne  I think you may have already refilled this med for this pt.    I can't tell from what I'm looking at.

## 2021-09-26 ENCOUNTER — PATIENT MESSAGE (OUTPATIENT)
Dept: RHEUMATOLOGY | Facility: CLINIC | Age: 76
End: 2021-09-26

## 2021-09-27 DIAGNOSIS — R76.12 POSITIVE QUANTIFERON-TB GOLD TEST: ICD-10-CM

## 2021-09-27 DIAGNOSIS — M54.12 CERVICAL RADICULOPATHY: ICD-10-CM

## 2021-09-27 DIAGNOSIS — M05.20 RHEUMATOID ARTERITIS: ICD-10-CM

## 2021-09-27 DIAGNOSIS — M35.01 SJOGREN'S SYNDROME WITH KERATOCONJUNCTIVITIS SICCA: ICD-10-CM

## 2021-09-27 DIAGNOSIS — Z79.899 HIGH RISK MEDICATION USE: ICD-10-CM

## 2021-09-27 DIAGNOSIS — G56.00 CARPAL TUNNEL SYNDROME, UNSPECIFIED LATERALITY: ICD-10-CM

## 2021-09-27 DIAGNOSIS — M85.80 OSTEOPENIA DETERMINED BY X-RAY: ICD-10-CM

## 2021-09-27 DIAGNOSIS — M15.9 PRIMARY OSTEOARTHRITIS INVOLVING MULTIPLE JOINTS: ICD-10-CM

## 2021-09-27 RX ORDER — LEFLUNOMIDE 20 MG/1
TABLET ORAL
Qty: 90 TABLET | Refills: 1 | Status: SHIPPED | OUTPATIENT
Start: 2021-09-27 | End: 2021-10-04

## 2021-09-28 ENCOUNTER — PATIENT MESSAGE (OUTPATIENT)
Dept: RHEUMATOLOGY | Facility: CLINIC | Age: 76
End: 2021-09-28

## 2021-09-28 RX ORDER — HYDROXYCHLOROQUINE SULFATE 200 MG/1
TABLET, FILM COATED ORAL
Qty: 180 TABLET | Refills: 1 | Status: SHIPPED | OUTPATIENT
Start: 2021-09-28 | End: 2021-09-28 | Stop reason: SDUPTHER

## 2021-09-28 RX ORDER — HYDROXYCHLOROQUINE SULFATE 200 MG/1
TABLET, FILM COATED ORAL
Qty: 180 TABLET | Refills: 1 | Status: SHIPPED | OUTPATIENT
Start: 2021-09-28

## 2021-10-04 ENCOUNTER — PATIENT MESSAGE (OUTPATIENT)
Dept: RHEUMATOLOGY | Facility: CLINIC | Age: 76
End: 2021-10-04

## 2021-10-04 RX ORDER — LEFLUNOMIDE 20 MG/1
20 TABLET ORAL DAILY
Qty: 90 TABLET | Refills: 3 | Status: SHIPPED | OUTPATIENT
Start: 2021-10-04 | End: 2022-10-04

## 2022-02-23 DIAGNOSIS — D84.9 IMMUNOSUPPRESSED STATUS: ICD-10-CM

## 2022-09-28 DIAGNOSIS — Z78.0 MENOPAUSE: ICD-10-CM

## 2024-05-11 NOTE — TELEPHONE ENCOUNTER
----- Message from Matt Frankel sent at 11/19/2018  3:57 PM CST -----  Contact: self/[phone]  Pt is calling to speak with someone in the office in regards to apt that was set today for 3. Please advise.        Thanks   PIV unsuccessful x 2, even with utilization of 24g, unable to obtain blood work, pt has history of difficult access, noted to have midline catheter in 1/2024.

## 2024-08-05 NOTE — PROGRESS NOTES
"Internal Medicine Clinic Note  04/02/2019    Subjective:       Patient ID: Urmila Martínez is a 74 y.o. female being seen for an urgent care visit    Chief Complaint: Fatigue; Anorexia; and Laryngitis    HPI   Urmila Martínez is a 74 y.o. F with rheumatoid arthritis, sjogren's syndrome, osteoarthritis who presents for an urgent care visit.   She was last seen by her PCP, Dr. Garica in 11/30/18 at which time she was treated for her flare up of inflammatory arthritis. Since then, she denies any ER visits of hospitalizations. She did visit with the Rheumatologist 3/21/19. She has had 2 flare up of her rheumatoid arthritis and she was started on methrotrexate 10mg weekly (on saturdays), given a steroid injection and 7-day medrol dose pack, which she has now finished. She has taken 2 doses of methotrexate thus far.    She comes in now complaining of feeling "off", fatigued, unable to participate in therapy, feeling cold all the time, and change in voice. She does feel some sinus congestion and post nasal drip for which she took flonase for 3 days, but reports it did not help resolve her symptoms. She reports 2 episodes of blurry vision which have now resolved. She also reports constipation which is now resolved. She denies any fevers, chills, sore throat, rhinorrhea, nausea, vomiting, abdominal pain, constipation, diarrhea or dysuria. She has not had symptoms like this previously.    Past Medical History:   Diagnosis Date    Arthritis     Asymptomatic PVCs     Benign liver cyst 09/26/2012    Fibrocystic breast 1995    left     Fibrocystic breast 1995    left    Fibrocystic breast 1997    right    Joint pain     Kidney stones 07/20/2012    NS (nuclear sclerosis) 7/19/2013    Osteopenia     Raynaud's disease     Rheumatoid arthritis(714.0)     Sjogren's disease        Past Surgical History:   Procedure Laterality Date    BREAST BIOPSY Bilateral     2 times - core needle right breast, biopsy left breast: " Surgical admission in Nov for intussusception s/p diagnostic laparoscopy 11/27/23 which showed a 15cm segment of small bowel intussusception mid jejunum with a mobile intraluminal soft bezoar which was milked distally    CT: [facility]  Short segment jejunojejunal intussusception in the left lateral abdomen, which is nonspecific but could be due to transient intussusception of no clinical significance because no abnormal small bowel dilation/SBO is seen and because the oral contrast material passed through this region to the level of the ileocecal valve.   No evidence of bowel obstruction, pneumoperitoneum, pneumatosis, appendicitis, or abscess. Mild amount of nonspecific dependent low-density ascites seen posterior to the urinary bladder  Keep NPO for now. IV hydration  PRN Analgesics/antiemetics  Monitor lytes and replete  Serial abdominal exams  Discussed with surgery, recommending slim admit for GI consult.  Will consider diagnostic lap inpatient vs outpatient  GI consult    fibrocystic findings    CHOLECYSTECTOMY  1990's     COLONOSCOPY N/A 10/20/2015    Performed by SHARRON Mcdonnell MD at [facility] ([facility])    CYST REMOVAL      right ankle - benign cyst removed at 7yrs old     CYST REMOVAL  about 2010    cyst removed from forehead     TONSILLECTOMY, ADENOIDECTOMY      during childhood        Family History   Problem Relation Age of Onset    Liver cancer Mother         liver and colon    Cancer Mother         colon, liver    Heart disease Mother         CABG    Colon cancer Father     Lung cancer Father         thinks colon was first    Cancer Father         colon, lung    Heart disease Brother         multiple bypass    Diabetes Brother     Allergies Daughter     Asthma Daughter     Asthma Grandchild     Cancer Maternal Aunt         stomach    Cancer Maternal Grandmother         breast    Breast cancer Maternal Grandmother     Heart disease Maternal Grandfather     COPD Paternal Grandfather     Cancer Maternal Aunt         thyroid    Fabry's disease Cousin     Amblyopia Neg Hx     Blindness Neg Hx     Cataracts Neg Hx     Glaucoma Neg Hx     Hypertension Neg Hx     Macular degeneration Neg Hx     Retinal detachment Neg Hx     Strabismus Neg Hx     Stroke Neg Hx     Thyroid disease Neg Hx     Melanoma Neg Hx        Social History     Socioeconomic History    Marital status:      Spouse name: Not on file    Number of children: 1    Years of education: Not on file    Highest education level: Not on file   Occupational History    Occupation: - retired   Social Needs    Financial resource strain: Not hard at all    Food insecurity:     Worry: Never true     Inability: Never true    Transportation needs:     Medical: No     Non-medical: No   Tobacco Use    Smoking status: Never Smoker    Smokeless tobacco: Never Used   Substance and Sexual Activity    Alcohol use: Yes     Frequency: Monthly or less     Drinks per  session: Patient refused     Binge frequency: Never     Comment: limited- glass a wine a few times a year    Drug use: No    Sexual activity: Never     Partners: Male     Comment:    Lifestyle    Physical activity:     Days per week: 4 days     Minutes per session: 50 min    Stress: Not at all   Relationships    Social connections:     Talks on phone: Patient refused     Gets together: Patient refused     Attends Nondenominational service: Not on file     Active member of club or organization: Yes     Attends meetings of clubs or organizations: More than 4 times per year     Relationship status:    Other Topics Concern    Are you pregnant or think you may be? No    Breast-feeding No   Social History Narrative    , has one daughter who lives in [place]. 3 grandchildren.     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        Patient's Medications   New Prescriptions    No medications on file   Previous Medications    ALENDRONATE (FOSAMAX) 70 MG TABLET    TAKE 1 TABLET (70 MG TOTAL) BY MOUTH EVERY 7 DAYS.    ASCORBIC ACID (VITAMIN C) 500 MG TABLET    Take 500 mg by mouth once daily.    ASPIRIN CHILD 81 MG CHEW    Take by mouth. 1 Tablet, Chewable Oral 4x times weekly    ATORVASTATIN (LIPITOR) 20 MG TABLET    Take 1 tablet (20 mg total) by mouth once daily.    BACLOFEN (LIORESAL) 10 MG TABLET    Take 1 tablet (10 mg total) by mouth 2 (two) times daily.    CALCIUM CITRATE-VITAMIN D3 315-200 MG (CITRACAL+D) 315-200 MG-UNIT PER TABLET    Take 1 tablet by mouth 2 (two) times daily.    CEVIMELINE (EVOXAC) 30 MG CAPSULE    Take 1 capsule (30 mg total) by mouth 2 (two) times daily.    DICLOFENAC SODIUM (VOLTAREN) 1 % GEL    Apply 2 g topically 2 (two) times daily.    FLUTICASONE (FLONASE) 50 MCG/ACTUATION NASAL SPRAY    1  Spray each nostril Every day    FOLIC ACID (FOLVITE) 1 MG TABLET    Take 1 tablet (1 mg total) by mouth once daily.    HYDROXYCHLOROQUINE (PLAQUENIL) 200 MG TABLET    TAKE 2  "TABLETS ONCE DAILY    MELOXICAM (MOBIC) 15 MG TABLET    Take 15 mg by mouth once daily.    METHOTREXATE 2.5 MG TAB    Take 4 tablets (10 mg total) by mouth every 7 days.    MISCELLANEOUS MEDICAL SUPPLY PACK    1 soft L hand splint for carpal tunnel   Modified Medications    No medications on file   Discontinued Medications    METHYLPREDNISOLONE (MEDROL DOSEPACK) 4 MG TABLET    use as directed     Review of Systems   Constitutional: Positive for malaise/fatigue. Negative for chills, fever and weight loss.   HENT: Negative for hearing loss and sore throat.         Hoarse voice   Eyes: Positive for blurred vision.   Respiratory: Negative for shortness of breath.    Cardiovascular: Negative for chest pain and palpitations.   Gastrointestinal: Negative for abdominal pain, constipation (resolved), diarrhea, heartburn, nausea and vomiting.   Genitourinary: Negative for urgency.   Musculoskeletal: Negative for myalgias.   Skin: Negative for rash.   Neurological: Negative for headaches.           Objective:      /75 (BP Location: Right arm, Patient Position: Sitting, BP Method: Medium (Manual))   Pulse 70   Ht 5' 5" (1.651 m)   Wt 81.2 kg (179 lb 0.2 oz)   LMP  (LMP Unknown) Comment: LMP age 38  SpO2 97%   BMI 29.79 kg/m²   Body mass index is 29.79 kg/m².    Physical Exam   Constitutional: She is oriented to person, place, and time. She appears well-developed and well-nourished.   HENT:   Head: Normocephalic and atraumatic.   Eyes: Pupils are equal, round, and reactive to light.   Neck: Normal range of motion. No JVD present.   Cardiovascular: Normal rate, regular rhythm, normal heart sounds and intact distal pulses. Exam reveals no gallop and no friction rub.   No murmur heard.  Pulmonary/Chest: Effort normal. No respiratory distress. She has no wheezes. She has no rales.   Abdominal: Soft. Bowel sounds are normal. She exhibits no distension. There is no tenderness.   Musculoskeletal: Normal range of motion. "   Lymphadenopathy:     She has no cervical adenopathy.   Neurological: She is alert and oriented to person, place, and time.   Skin: Skin is warm and dry.   Psychiatric: She has a normal mood and affect.   Nursing note and vitals reviewed.      Assessment:         1. Fatigue, unspecified type  TSH    CBC auto differential    Comprehensive metabolic panel    Vitamin B12    Folate    CANCELED: Folate   2. Blurry vision  Ambulatory Referral to Optometry   3. Cold  TSH    CBC auto differential    Comprehensive metabolic panel    Vitamin B12    Folate    CANCELED: Folate   4. Leukocytosis, unspecified type  TSH    CBC auto differential    Comprehensive metabolic panel    Vitamin B12    Folate         Plan:         Urmila Martínez is a 74 y.o. female being seen for an urgent care visit. She describes non-specific symptoms of voice hoarseness, generalized fatigue, feeling cold that are not classic for an acute infection or sore throat. On review of her labs, her mild leukocytosis with lack of immature granulocytes are likely due to recent steroid use instead of infection. Given that her symptoms onset after starting the steroids and methotrexate consecutively approximately 2 weeks ago, will wait for symptoms to improve. Reassurance provided and labs check next week (CBC, CMP, TSH, B12, folate) to determine change/resolution. Pt plans to stop taking methotrexate for the time being.    Referral to Opthalmology for further evaluation of blurry vision.     Patient seen and plan of care discussed with Dr. Garcia. Follow up in 3 months for annual with Dr. Garcia, or earlier, as needed.    Ro Long MD  Internal Medicine, PGY-3  [phone]